# Patient Record
Sex: FEMALE | Race: WHITE | Employment: FULL TIME
[De-identification: names, ages, dates, MRNs, and addresses within clinical notes are randomized per-mention and may not be internally consistent; named-entity substitution may affect disease eponyms.]

---

## 2017-01-05 ENCOUNTER — OFFICE VISIT (OUTPATIENT)
Dept: FAMILY MEDICINE CLINIC | Facility: CLINIC | Age: 52
End: 2017-01-05

## 2017-01-05 VITALS
WEIGHT: 291 LBS | SYSTOLIC BLOOD PRESSURE: 120 MMHG | DIASTOLIC BLOOD PRESSURE: 72 MMHG | TEMPERATURE: 97.7 F | HEART RATE: 88 BPM | BODY MASS INDEX: 41.75 KG/M2

## 2017-01-05 DIAGNOSIS — J02.9 SORE THROAT: Primary | ICD-10-CM

## 2017-01-05 DIAGNOSIS — J40 BRONCHITIS: ICD-10-CM

## 2017-01-05 LAB — S PYO AG THROAT QL: NORMAL

## 2017-01-05 PROCEDURE — G8484 FLU IMMUNIZE NO ADMIN: HCPCS | Performed by: FAMILY MEDICINE

## 2017-01-05 PROCEDURE — 87880 STREP A ASSAY W/OPTIC: CPT | Performed by: FAMILY MEDICINE

## 2017-01-05 PROCEDURE — 99213 OFFICE O/P EST LOW 20 MIN: CPT | Performed by: FAMILY MEDICINE

## 2017-01-05 PROCEDURE — G8419 CALC BMI OUT NRM PARAM NOF/U: HCPCS | Performed by: FAMILY MEDICINE

## 2017-01-05 PROCEDURE — 3017F COLORECTAL CA SCREEN DOC REV: CPT | Performed by: FAMILY MEDICINE

## 2017-01-05 PROCEDURE — G8427 DOCREV CUR MEDS BY ELIG CLIN: HCPCS | Performed by: FAMILY MEDICINE

## 2017-01-05 PROCEDURE — 3014F SCREEN MAMMO DOC REV: CPT | Performed by: FAMILY MEDICINE

## 2017-01-05 PROCEDURE — 1036F TOBACCO NON-USER: CPT | Performed by: FAMILY MEDICINE

## 2017-01-05 RX ORDER — AZITHROMYCIN 250 MG/1
TABLET, FILM COATED ORAL
Qty: 1 PACKET | Refills: 0 | Status: SHIPPED | OUTPATIENT
Start: 2017-01-05 | End: 2017-01-15

## 2017-01-05 ASSESSMENT — ENCOUNTER SYMPTOMS
NAUSEA: 1
VOMITING: 0
COUGH: 1
SORE THROAT: 1

## 2017-01-15 ASSESSMENT — ENCOUNTER SYMPTOMS
CONSTIPATION: 0
COLOR CHANGE: 0
PHOTOPHOBIA: 0
TROUBLE SWALLOWING: 0
ABDOMINAL DISTENTION: 0
DIARRHEA: 0
BACK PAIN: 0
SHORTNESS OF BREATH: 0
WHEEZING: 0
ABDOMINAL PAIN: 0

## 2017-06-09 ENCOUNTER — OFFICE VISIT (OUTPATIENT)
Dept: FAMILY MEDICINE CLINIC | Age: 52
End: 2017-06-09
Payer: COMMERCIAL

## 2017-06-09 VITALS
BODY MASS INDEX: 41.61 KG/M2 | SYSTOLIC BLOOD PRESSURE: 115 MMHG | OXYGEN SATURATION: 98 % | DIASTOLIC BLOOD PRESSURE: 76 MMHG | WEIGHT: 290 LBS | HEART RATE: 78 BPM

## 2017-06-09 DIAGNOSIS — M25.512 ACUTE PAIN OF LEFT SHOULDER: Primary | ICD-10-CM

## 2017-06-09 PROCEDURE — 3014F SCREEN MAMMO DOC REV: CPT | Performed by: FAMILY MEDICINE

## 2017-06-09 PROCEDURE — G8427 DOCREV CUR MEDS BY ELIG CLIN: HCPCS | Performed by: FAMILY MEDICINE

## 2017-06-09 PROCEDURE — 99213 OFFICE O/P EST LOW 20 MIN: CPT | Performed by: FAMILY MEDICINE

## 2017-06-09 PROCEDURE — 1036F TOBACCO NON-USER: CPT | Performed by: FAMILY MEDICINE

## 2017-06-09 PROCEDURE — 3017F COLORECTAL CA SCREEN DOC REV: CPT | Performed by: FAMILY MEDICINE

## 2017-06-09 PROCEDURE — G8419 CALC BMI OUT NRM PARAM NOF/U: HCPCS | Performed by: FAMILY MEDICINE

## 2017-06-09 RX ORDER — MELOXICAM 15 MG/1
7.5-15 TABLET ORAL DAILY PRN
Qty: 30 TABLET | Refills: 0 | Status: SHIPPED | OUTPATIENT
Start: 2017-06-09 | End: 2017-07-27 | Stop reason: SDUPTHER

## 2017-06-18 ASSESSMENT — ENCOUNTER SYMPTOMS
ABDOMINAL PAIN: 0
CONSTIPATION: 0
TROUBLE SWALLOWING: 0
COLOR CHANGE: 0
BACK PAIN: 0
PHOTOPHOBIA: 0
COUGH: 0
VOMITING: 0
SHORTNESS OF BREATH: 0
DIARRHEA: 0
ABDOMINAL DISTENTION: 0
NAUSEA: 0
WHEEZING: 0

## 2017-07-27 DIAGNOSIS — M25.512 ACUTE PAIN OF LEFT SHOULDER: ICD-10-CM

## 2017-07-28 RX ORDER — MELOXICAM 15 MG/1
TABLET ORAL
Qty: 30 TABLET | Refills: 0 | Status: SHIPPED | OUTPATIENT
Start: 2017-07-28 | End: 2017-10-10 | Stop reason: ALTCHOICE

## 2017-10-10 ENCOUNTER — OFFICE VISIT (OUTPATIENT)
Dept: FAMILY MEDICINE CLINIC | Age: 52
End: 2017-10-10
Payer: COMMERCIAL

## 2017-10-10 VITALS
SYSTOLIC BLOOD PRESSURE: 122 MMHG | HEART RATE: 80 BPM | HEIGHT: 70 IN | BODY MASS INDEX: 41.66 KG/M2 | DIASTOLIC BLOOD PRESSURE: 80 MMHG | OXYGEN SATURATION: 98 % | WEIGHT: 291 LBS | TEMPERATURE: 98.2 F

## 2017-10-10 DIAGNOSIS — M25.512 ACUTE PAIN OF LEFT SHOULDER: ICD-10-CM

## 2017-10-10 DIAGNOSIS — Z02.89 ENCOUNTER FOR PHYSICAL EXAMINATION RELATED TO EMPLOYMENT: ICD-10-CM

## 2017-10-10 DIAGNOSIS — M75.102 TEAR OF LEFT ROTATOR CUFF, UNSPECIFIED TEAR EXTENT: Primary | ICD-10-CM

## 2017-10-10 LAB
BILIRUBIN, POC: NORMAL
BLOOD URINE, POC: NORMAL
CLARITY, POC: CLEAR
COLOR, POC: NORMAL
GLUCOSE URINE, POC: NORMAL
KETONES, POC: NORMAL
LEUKOCYTE EST, POC: NORMAL
NITRITE, POC: NORMAL
PH, POC: 6
PROTEIN, POC: NORMAL
SPECIFIC GRAVITY, POC: 1.01
UROBILINOGEN, POC: NORMAL

## 2017-10-10 PROCEDURE — 81002 URINALYSIS NONAUTO W/O SCOPE: CPT | Performed by: NURSE PRACTITIONER

## 2017-10-10 PROCEDURE — 99396 PREV VISIT EST AGE 40-64: CPT | Performed by: NURSE PRACTITIONER

## 2017-10-10 ASSESSMENT — ENCOUNTER SYMPTOMS
ABDOMINAL PAIN: 0
BLURRED VISION: 0
DOUBLE VISION: 0
SORE THROAT: 0
DIARRHEA: 0
SHORTNESS OF BREATH: 0
VOMITING: 0
BACK PAIN: 0
CONSTIPATION: 0
ORTHOPNEA: 0
NAUSEA: 0
COUGH: 0
HEARTBURN: 0
BLOOD IN STOOL: 0

## 2017-10-10 NOTE — PROGRESS NOTES
HPI Notes    Name: Emiliana Baker  : 1965         Chief Complaint:     Chief Complaint   Patient presents with    Employment Physical     work physical for bus driving    Shoulder Pain     left shoulder pain, has been present x 4 months. Hurts to raise arm, limited range of motion       History of Present Illness:      Emiliana Baker is a 47 y.o.  female who presents with Employment Physical (work physical for bus driving) and Shoulder Pain (left shoulder pain, has been present x 4 months. Hurts to raise arm, limited range of motion)      Shoulder Pain    The pain is present in the left shoulder. This is a recurrent problem. The current episode started more than 1 month ago. There has been no history of extremity trauma. The problem occurs daily. The problem has been gradually worsening. The quality of the pain is described as sharp. The pain is moderate. Associated symptoms include stiffness. Pertinent negatives include no fever, itching or joint locking. The symptoms are aggravated by activity. She has tried OTC pain meds for the symptoms. The treatment provided mild relief. 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 She denies any physical aspects of her job that she is unable to perform.        Past Medical History:     Past Medical History:   Diagnosis Date    Bell's palsy 2013    Carpal tunnel syndrome     Chicken pox     Hydronephrosis 2009    left     Hyperlipidemia 2013    Pneumonia     Renal atrophy     UTI (urinary tract infection)       Reviewed all health maintenance requirements and ordered appropriate tests  Health Maintenance Due   Topic Date Due    Hepatitis C screen  1965    HIV screen  1979    DTaP/Tdap/Td vaccine (1 - Tdap) 1983    Cervical cancer screen  1985    Colon cancer screen colonoscopy  2014    Diabetes screen  2016    Flu vaccine (1) 2017       Past Surgical History:     Past time. She appears well-developed and well-nourished. HENT:   Head: Normocephalic. Neck: Normal range of motion. Cardiovascular: Normal rate, regular rhythm, S1 normal, S2 normal and normal heart sounds. No murmur heard. Pulmonary/Chest: Effort normal and breath sounds normal. No respiratory distress. Abdominal: Soft. Normal appearance and bowel sounds are normal. There is no tenderness. Musculoskeletal:        Left shoulder: She exhibits decreased range of motion, tenderness, pain and decreased strength. Larry test positive. Pt unable to do Apley scratch test. PE inhibited due to apprehension. Neurological: She is alert and oriented to person, place, and time. Nursing note and vitals reviewed. Data:     Lab Results   Component Value Date     11/25/2013    K 4.3 11/25/2013     11/25/2013    CO2 27 11/25/2013    BUN 9 11/25/2013    CREATININE 0.60 11/25/2013    GLUCOSE 93 11/25/2013    PROT 7.2 11/25/2013    LABALBU 4.8 02/03/2014    BILITOT 0.76 11/25/2013    ALKPHOS 69 02/03/2014    AST 28 02/03/2014    ALT 40 02/03/2014     No results found for: WBC, RBC, HGB, HCT, MCV, MCH, MCHC, RDW, PLT, MPV  Lab Results   Component Value Date    TSH 1.26 11/25/2013     Lab Results   Component Value Date    CHOL 169 02/03/2014    HDL 43 02/03/2014    LABA1C 5.8 11/25/2013          Assessment & Plan       1. Tear of left rotator cuff, unspecified tear extent   ---Patient was seen June 6, 2017 at which time patient was scheduled for physical therapy. However, patient never went to physical therapy. Shoulder has continued to get worse with decreasing range of motion. We will send for physical therapy at this time. Patient will also take Aleve 2 tablets in a.m., 1 tablet in p.m. patient advised about the importance of following up with physical therapy. If physical therapy does not help or if improvement is not noted, we will send for MRI. [facility] [place]   2.  Acute

## 2017-10-10 NOTE — PATIENT INSTRUCTIONS
SURVEY:    You may be receiving a survey from [employer] regarding your visit today. Please complete the survey to enable us to provide the highest quality of care to you and your family. If you cannot score us a very good on any question, please call the office to discuss how we could of made your experience a very good one. Thank you.

## 2017-10-20 ENCOUNTER — HOSPITAL ENCOUNTER (OUTPATIENT)
Dept: PHYSICAL THERAPY | Age: 52
Setting detail: THERAPIES SERIES
Discharge: HOME | End: 2017-10-20
Payer: COMMERCIAL

## 2017-10-20 PROCEDURE — G0283 ELEC STIM OTHER THAN WOUND: HCPCS

## 2017-10-20 PROCEDURE — 97162 PT EVAL MOD COMPLEX 30 MIN: CPT

## 2017-10-20 ASSESSMENT — PAIN DESCRIPTION - ORIENTATION: ORIENTATION: LEFT

## 2017-10-20 ASSESSMENT — PAIN DESCRIPTION - LOCATION: LOCATION: SHOULDER

## 2017-10-20 ASSESSMENT — PAIN SCALES - GENERAL: PAINLEVEL_OUTOF10: 7

## 2017-10-20 NOTE — PROGRESS NOTES
Phone: [phone] [employer] [address]         Fax: [phone]                      Outpatient Physical Therapy                                                                      Evaluation    Date: 10/20/2017  Patient: Yoko Schmidt  : 1965  ACCT #: [de-identified]    Referring Practitioner: Gerber Laws CNP    Referral Date : 10/10/17    Diagnosis: Tear of left rotator cuff    Treatment Diagnosis: left shld pain  Onset Date: 10/10/17  PT Insurance Information: Medical [place]  Total # of Visits Approved: 12 Per Physician Order  Total # of Visits to Date: 1     Subjective     Additional Pertinent Hx: 2017 noticed gradual onset of pain in left shoulder. Now she is unable to lift arm sideways. Pain 0/10 sitting and resting; 7/10 with reaching/ activities . C/o weakness in left arm. .  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 Meds- ibrophen as needed. Diane Alvarez No MRI yet but suspected rotator cuff tear.   Pain Screening  Patient Currently in Pain: Yes  Pain Assessment  Pain Assessment: 0-10  Pain Level: 7 (with reaching)  Pain Location: Shoulder  Pain Orientation: Left  Social/Functional History  Occupation: Full time employment  Type of occupation: teacher       Objective           Strength LUE  Strength LUE: Exception  L Shoulder Flexion: 3+/5  L Shoulder ABduction: 3-/5  L Shoulder Internal Rotation: 4-/5  L Shoulder External Rotation: 4-/5  L Shoulder Horizontal ABduction: 3+/5    AROM LUE (degrees)  LUE AROM : Exceptions  L Shoulder Flexion 0-180: 135  L Shoulder ABduction 0-180: 78     PROM LUE (degrees)  LUE PROM: Exceptions  L Shoulder Flex  0-170: 140  L Shoulder ABduction 0-140: 90  L Shoulder Int Rotation  0-70: 45  L Shoulder Ext Rotation  0-90: 45              Assessment  Body structures, Functions, Activity limitations: Decreased ADL status, Decreased ROM, Decreased strength  Prognosis: Fair  Decision Making: Medium Complexity  History: as above  Exam: UEFS score of 52/80  Clinical Presentation: evolving    Clinical Presentation:  [] Stable/Uncomplicated  [x] Evolving [] Unstable/Unpredictable  The Following Comorbities will impact the patients progression and Plan of Care:   [] Diabetes    [] Stroke  [] Cardiac Disease/Pacemaker [] Previous Orthopedic Injury/Surgery  [] Seizure Disorder   [] WB Restrictions  [x] Obesity    [] Neuropathy     Education:   Patient Education: On POC and HEP handout        Goals  Short term goals  Time Frame for Short term goals: 6  Short term goal 1: Patient to be independent with HEP  Short term goal 2: Increase PROM left shld ER 70 degrees  Short term goal 3: Increase PROM left shld IR 60 degrees  Short term goal 4: Increase PROM left shld abduction 120    Long term goals  Time Frame for Long term goals : 12  Long term goal 1:  Increase AROM left shldflexion 150 for functional overhead reach  Long term goal 2: Decrease pain 4/10 at worse with activity  Long term goal 3: Improve functional activities with score of 65/80 or better on UEFS    Patient's Goal:   Regain full use of left arm    Timed Code Treatment Minutes: 5 Minutes  Total Treatment Time: 45     Time In: 12:55  Time Out: 13:40    Lenny Rizvi  10/20/2017

## 2017-10-20 NOTE — PLAN OF CARE
[facility]       Phone: [phone]   Date: 10/20/2017                      Outpatient Physical Therapy  Fax: [phone]    ACCT #: [[id number]]                     Plan of Care  [facility]#: [id number]  Patient: Geovany Ruiz  : 1965    Referring Practitioner: Val Lucio CNP    Referral Date : 10/10/17    Diagnosis: Tear of left rotator cuff  Onset Date: 10/10/17  Treatment Diagnosis: left shld pain    Assessment  Body structures, Functions, Activity limitations: Decreased ADL status, Decreased ROM, Decreased strength  Prognosis: Fair    Treatment Plan   Days: 2 times per week Weeks: 6 weeks Total # of Visits Approved: 12  [x] HP/CP     [x] Electrical Stimulation   [x]  Therapeutic Exercise   []  Gait Training  [] Traction   [] Ultrasound                   []  Massage                        []  Work Conditioning   [] Aquatics  [] Back Education            []  Therapeutic Activity [x]  Manual Therapy  [x]  Patient Education/HEP []  Anodyne Therapy     Goals  Short term goals  Time Frame for Short term goals: 6  Short term goal 1: Patient to be independent with HEP  Short term goal 2: Increase PROM left shld ER 70 degrees  Short term goal 3: Increase PROM left shld IR 60 degrees  Short term goal 4: Increase PROM left shld abduction 120  Long term goals  Time Frame for Long term goals : 12  Long term goal 1: Increase AROM left shldflexion 150 for functional overhead reach  Long term goal 2: Decrease pain 4/10 at worse with activity  Long term goal 3: Improve functional activities with score of 65/80 or better on UEFS     Catalina Shaver, PT     Date: 10/20/2017    ______________________________________ Date: 10/20/2017  Physician Signature  By signing above or cosigning electronically, I have reviewed this Plan of Care and certify a need for medically necessary rehabilitation services.

## 2017-10-23 ENCOUNTER — HOSPITAL ENCOUNTER (OUTPATIENT)
Dept: PHYSICAL THERAPY | Age: 52
Setting detail: THERAPIES SERIES
Discharge: HOME | End: 2017-10-23
Payer: COMMERCIAL

## 2017-10-23 PROCEDURE — 97140 MANUAL THERAPY 1/> REGIONS: CPT

## 2017-10-23 PROCEDURE — 97110 THERAPEUTIC EXERCISES: CPT

## 2017-10-23 PROCEDURE — G0283 ELEC STIM OTHER THAN WOUND: HCPCS

## 2017-10-23 ASSESSMENT — PAIN DESCRIPTION - ORIENTATION: ORIENTATION: LEFT

## 2017-10-23 ASSESSMENT — PAIN SCALES - GENERAL: PAINLEVEL_OUTOF10: 7

## 2017-10-23 ASSESSMENT — PAIN DESCRIPTION - LOCATION: LOCATION: SHOULDER

## 2017-10-30 ENCOUNTER — HOSPITAL ENCOUNTER (OUTPATIENT)
Dept: PHYSICAL THERAPY | Age: 52
Setting detail: THERAPIES SERIES
Discharge: HOME | End: 2017-10-30
Payer: COMMERCIAL

## 2017-10-30 PROCEDURE — 97110 THERAPEUTIC EXERCISES: CPT

## 2017-10-30 PROCEDURE — G0283 ELEC STIM OTHER THAN WOUND: HCPCS

## 2017-10-30 ASSESSMENT — PAIN SCALES - GENERAL: PAINLEVEL_OUTOF10: 6

## 2017-11-01 ENCOUNTER — HOSPITAL ENCOUNTER (OUTPATIENT)
Dept: PHYSICAL THERAPY | Age: 52
Setting detail: THERAPIES SERIES
Discharge: HOME | End: 2017-11-01
Payer: COMMERCIAL

## 2017-11-01 PROCEDURE — 97110 THERAPEUTIC EXERCISES: CPT

## 2017-11-01 PROCEDURE — G0283 ELEC STIM OTHER THAN WOUND: HCPCS

## 2017-11-01 ASSESSMENT — PAIN SCALES - GENERAL: PAINLEVEL_OUTOF10: 6

## 2017-11-06 ENCOUNTER — HOSPITAL ENCOUNTER (OUTPATIENT)
Dept: PHYSICAL THERAPY | Age: 52
Setting detail: THERAPIES SERIES
Discharge: HOME | End: 2017-11-06
Payer: COMMERCIAL

## 2017-11-06 PROCEDURE — G0283 ELEC STIM OTHER THAN WOUND: HCPCS

## 2017-11-06 PROCEDURE — 97140 MANUAL THERAPY 1/> REGIONS: CPT

## 2017-11-06 PROCEDURE — 97110 THERAPEUTIC EXERCISES: CPT

## 2017-11-06 ASSESSMENT — PAIN SCALES - GENERAL: PAINLEVEL_OUTOF10: 4

## 2017-11-06 NOTE — PROGRESS NOTES
Phone: [phone]            Fax: [phone]                             Outpatient Physical Therapy                                                                      Progress Report    Date: 2017   MRN: [id number]    ACCT#: [[id number]]  Patient: Marley Wilcox  : 1965  Referring Practitioner: Sarah King CNP    Referral Date : (P) 10/10/17    Diagnosis: (P) Tear of left rotator cuff      Treatment Diagnosis: (P) left shld pain    Onset Date: (P) 10/10/17  PT Insurance Information: (P) Medical [place]  Total # of Visits Approved: (P) 12 Per Physician Order   Visits Attended: 5    Assessment  No change in symptoms; Pain 5/14 with certain movements. Decrease shoulder ROM internal and external rotation. Weakness in rotator cuff. Please advise.     Prognosis: (P) Fair    Plan  Plan: (P) Continue with current plan    Goals  Short term goals  Time Frame for Short term goals: (P) 6  Short term goal 1: (P) Patient to be independent with HEP  Short term goal 2: (P) Increase PROM left shld ER 70 degrees  Short term goal 3: (P) Increase PROM left shld IR 60 degrees  Short term goal 4: (P) Increase PROM left shld abduction 120    Long term goals  Time Frame for Long term goals : (P) 12  Long term goal 1: (P) Increase AROM left shldflexion 150 for functional overhead reach  Long term goal 2: (P) Decrease pain 4/10 at worse with activity  Long term goal 3: (P) Improve functional activities with score of 65/80 or better on UEFS    [facility] Number: (P) [id number]     Date: 2017

## 2017-11-07 ENCOUNTER — TELEPHONE (OUTPATIENT)
Dept: FAMILY MEDICINE CLINIC | Age: 52
End: 2017-11-07

## 2017-11-07 DIAGNOSIS — M75.102 TEAR OF LEFT ROTATOR CUFF, UNSPECIFIED TEAR EXTENT: Primary | ICD-10-CM

## 2017-11-07 NOTE — TELEPHONE ENCOUNTER
[address] called stating [facility] hasn't been progressing with her PT for her shoulder pain. Would like to know if Maureen Garcia could go ahead and order an MRI for her. Please let [address] know.     Health Maintenance   Topic Date Due    Hepatitis C screen  1965    HIV screen  06/25/1979    DTaP/Tdap/Td vaccine (1 - Tdap) 06/25/1983    Cervical cancer screen  06/25/1985    Colon cancer screen colonoscopy  06/25/2014    Diabetes screen  11/25/2016    Flu vaccine (1) 09/01/2017    Breast cancer screen  12/28/2018    Lipid screen  02/03/2019             (applicable per patient's age: Cancer Screenings, Depression Screening, Fall Risk Screening, Immunizations)    Hemoglobin A1C (%)   Date Value   11/25/2013 5.8     LDL Cholesterol (mg/dL)   Date Value   11/25/2013 104     LDL Calculated (mg/dL)   Date Value   02/03/2014 92     AST (U/L)   Date Value   02/03/2014 28     ALT (U/L)   Date Value   02/03/2014 40     BUN (mg/dL)   Date Value   11/25/2013 9      (goal A1C is < 7)   (goal LDL is <100) need 30-50% reduction from baseline     BP Readings from Last 3 Encounters:   10/10/17 122/80   06/09/17 115/76   01/05/17 120/72    (goal /80)      All Future Testing planned in [facility]:  Lab Frequency Next Occurrence       Next Visit Date:  Future Appointments  Date Time Provider Maykel Iqbal   11/8/2017 4:00 PM Leila Haider, [facility] [place]   11/14/2017 6:00 AM Obinna Eagle, [facility]            Patient Active Problem List:     Right foot pain     Bell's palsy     Hyperlipidemia     Fatigue

## 2017-11-08 ENCOUNTER — HOSPITAL ENCOUNTER (OUTPATIENT)
Dept: PHYSICAL THERAPY | Age: 52
Setting detail: THERAPIES SERIES
Discharge: HOME | End: 2017-11-08
Payer: COMMERCIAL

## 2017-11-08 PROCEDURE — 97110 THERAPEUTIC EXERCISES: CPT

## 2017-11-08 PROCEDURE — G0283 ELEC STIM OTHER THAN WOUND: HCPCS

## 2017-11-08 ASSESSMENT — PAIN SCALES - GENERAL: PAINLEVEL_OUTOF10: 4

## 2017-11-08 NOTE — PROGRESS NOTES
Phone: [phone]      Fax: [phone]                            Outpatient Physical Therapy                                                                            Daily Note    Date: 2017  Patient Name: Augustus Murphy        MRN: [id number]   ACCT#:  [[id number]]  : 1965  (47 y.o.)    Referring Practitioner: Jordan Ferreira CNP    Referral Date : 10/10/17    Diagnosis: Tear of left rotator cuff  Treatment Diagnosis: left shld pain    Onset Date: 10/10/17  PT Insurance Information: Medical [place]  Total # of Visits Approved: 12 Per Physician Order  Total # of Visits to Date: 6    Pre-Treatment Pain:  4/10     Assessment  Assessment: Pt. reports L shoulder pain is a 4/10 this afternoon. She states she didn't have to do much lifting at work today so her shoulder isn't feeling too bad. Good tolerance to exercises and sholder mobs. Pt. being placed on hold at this time until pt. follows up with [facility] and gets the results from her MRI. Chart Reviewed: Yes    Plan  Plan: Hold pending MD assessment    Exercises/Modalities/Manual:  See DocFlow Sheet    Education:           Goals  (Total # of Visits to Date: 6)   Short Term Goals - Time Frame for Short term goals: 6  Short term goal 1: Patient to be independent with HEP  Short term goal 2: Increase PROM left shld ER 70 degrees  Short term goal 3: Increase PROM left shld IR 60 degrees  Short term goal 4: Increase PROM left shld abduction 120       Long Term Goals - Time Frame for Long term goals : 12  Long term goal 1:  Increase AROM left shldflexion 150 for functional overhead reach  Long term goal 2: Decrease pain 4/10 at worse with activity  Long term goal 3: Improve functional activities with score of 65/80 or better on UEFS          Post Treatment Pain:  0/10    Time In: 1610    Time Out : 1650   Timed Code Treatment Minutes: 30 Minutes  Total Treatment Time: [facility] Number: PTA    Date: 11/8/2017

## 2017-11-15 ENCOUNTER — HOSPITAL ENCOUNTER (OUTPATIENT)
Dept: MRI IMAGING | Age: 52
Discharge: HOME | End: 2017-11-15
Payer: COMMERCIAL

## 2017-11-15 DIAGNOSIS — M75.102 TEAR OF LEFT ROTATOR CUFF, UNSPECIFIED TEAR EXTENT: ICD-10-CM

## 2017-11-15 PROCEDURE — 73221 MRI JOINT UPR EXTREM W/O DYE: CPT

## 2017-11-16 ENCOUNTER — TELEPHONE (OUTPATIENT)
Dept: FAMILY MEDICINE CLINIC | Age: 52
End: 2017-11-16

## 2018-01-02 ENCOUNTER — HOSPITAL ENCOUNTER (OUTPATIENT)
Age: 53
Setting detail: SPECIMEN
Discharge: HOME | End: 2018-01-02
Payer: COMMERCIAL

## 2018-01-02 ENCOUNTER — OFFICE VISIT (OUTPATIENT)
Dept: FAMILY MEDICINE CLINIC | Age: 53
End: 2018-01-02
Payer: COMMERCIAL

## 2018-01-02 VITALS
TEMPERATURE: 97.7 F | BODY MASS INDEX: 42.18 KG/M2 | DIASTOLIC BLOOD PRESSURE: 84 MMHG | SYSTOLIC BLOOD PRESSURE: 132 MMHG | WEIGHT: 293 LBS

## 2018-01-02 DIAGNOSIS — R30.0 DYSURIA: ICD-10-CM

## 2018-01-02 DIAGNOSIS — R30.0 DYSURIA: Primary | ICD-10-CM

## 2018-01-02 LAB
BILIRUBIN, POC: NEGATIVE
BLOOD URINE, POC: NEGATIVE
CLARITY, POC: CLEAR
COLOR, POC: NORMAL
GLUCOSE URINE, POC: NORMAL
KETONES, POC: NEGATIVE
LEUKOCYTE EST, POC: NORMAL
NITRITE, POC: NEGATIVE
PH, POC: 5
PROTEIN, POC: NEGATIVE
SPECIFIC GRAVITY, POC: 1.02
UROBILINOGEN, POC: 0.2

## 2018-01-02 PROCEDURE — 99213 OFFICE O/P EST LOW 20 MIN: CPT | Performed by: FAMILY MEDICINE

## 2018-01-02 PROCEDURE — 87086 URINE CULTURE/COLONY COUNT: CPT

## 2018-01-02 PROCEDURE — 81002 URINALYSIS NONAUTO W/O SCOPE: CPT | Performed by: FAMILY MEDICINE

## 2018-01-02 RX ORDER — OXYCODONE HYDROCHLORIDE AND ACETAMINOPHEN 5; 325 MG/1; MG/1
TABLET ORAL
Refills: 0 | COMMUNITY
Start: 2017-12-13 | End: 2018-03-21 | Stop reason: ALTCHOICE

## 2018-01-02 ASSESSMENT — ENCOUNTER SYMPTOMS
VOMITING: 0
DIARRHEA: 0
NAUSEA: 0
EYE PAIN: 0
ABDOMINAL PAIN: 0
EYE DISCHARGE: 0

## 2018-01-02 ASSESSMENT — PATIENT HEALTH QUESTIONNAIRE - PHQ9
SUM OF ALL RESPONSES TO PHQ QUESTIONS 1-9: 0
1. LITTLE INTEREST OR PLEASURE IN DOING THINGS: 0
SUM OF ALL RESPONSES TO PHQ9 QUESTIONS 1 & 2: 0
2. FEELING DOWN, DEPRESSED OR HOPELESS: 0

## 2018-01-02 NOTE — PROGRESS NOTES
(ZOCOR) 10 MG tablet Take 1 tablet by mouth nightly. 3/23/21   Edmond Elmore NP        Allergies:       Sulfa antibiotics    Social History:     Tobacco:    reports that she has never smoked. She has never used smokeless tobacco.  Alcohol:      reports that she does not drink alcohol. Drug Use:  reports that she does not use drugs. Family History:     Family History   Problem Relation Age of Onset    Cancer Mother      breast    Heart Disease Mother     Stroke Mother     Cancer Father        Review of Systems:       Review of Systems   Constitutional: Negative for chills and fever. Eyes: Negative for pain and discharge. Gastrointestinal: Negative for abdominal pain, diarrhea, nausea and vomiting. Genitourinary: Positive for dysuria. Negative for decreased urine volume, difficulty urinating and hematuria. Skin: Negative for pallor and rash. Physical Exam:     Physical Exam   Constitutional: She appears well-developed and well-nourished. HENT:   Head: Normocephalic and atraumatic. Eyes: Right eye exhibits no discharge. Abdominal: Soft. Bowel sounds are normal. She exhibits no distension. There is no tenderness. There is no rebound and no guarding. Skin: No rash noted. No erythema. Vitals reviewed.       Vitals:  /84   Temp 97.7 °F (36.5 °C) (Oral)   Wt 294 lb (133.4 kg)   BMI 42.18 kg/m²       Data:     Lab Results   Component Value Date     11/25/2013    K 4.3 11/25/2013     11/25/2013    CO2 27 11/25/2013    BUN 9 11/25/2013    CREATININE 0.60 11/25/2013    GLUCOSE 93 11/25/2013    PROT 7.2 11/25/2013    LABALBU 4.8 02/03/2014    BILITOT 0.76 11/25/2013    ALKPHOS 69 02/03/2014    AST 28 02/03/2014    ALT 40 02/03/2014     No results found for: WBC, RBC, HGB, HCT, MCV, MCH, MCHC, RDW, PLT, MPV  Lab Results   Component Value Date    TSH 1.26 11/25/2013     Lab Results   Component Value Date    CHOL 169 02/03/2014    HDL 43 02/03/2014    LABA1C 5.8 11/25/2013 Assessment/Plan:       1. Dysuria  Stable and will send the patient's urine for culture in 2d and will call pt with results. Pt will continue increased water and cranberry juice. - POCT Urinalysis no Micro  - Urine Culture;  Future        Electronically signed by Desiree Nugent MD on 1/2/2018 at 10:34 AM

## 2018-01-03 LAB
CULTURE: NORMAL
CULTURE: NORMAL
Lab: NORMAL
SPECIMEN DESCRIPTION: NORMAL
SPECIMEN DESCRIPTION: NORMAL
STATUS: NORMAL

## 2018-03-21 ENCOUNTER — OFFICE VISIT (OUTPATIENT)
Dept: FAMILY MEDICINE CLINIC | Age: 53
End: 2018-03-21
Payer: COMMERCIAL

## 2018-03-21 VITALS
WEIGHT: 290 LBS | BODY MASS INDEX: 41.61 KG/M2 | DIASTOLIC BLOOD PRESSURE: 70 MMHG | OXYGEN SATURATION: 97 % | HEART RATE: 75 BPM | SYSTOLIC BLOOD PRESSURE: 116 MMHG

## 2018-03-21 DIAGNOSIS — Z00.00 WELL ADULT EXAM: Primary | ICD-10-CM

## 2018-03-21 DIAGNOSIS — F34.1 DYSTHYMIA: ICD-10-CM

## 2018-03-21 PROCEDURE — 99396 PREV VISIT EST AGE 40-64: CPT | Performed by: FAMILY MEDICINE

## 2018-03-29 ENCOUNTER — TELEPHONE (OUTPATIENT)
Dept: FAMILY MEDICINE CLINIC | Age: 53
End: 2018-03-29

## 2018-04-01 ASSESSMENT — ENCOUNTER SYMPTOMS
ABDOMINAL PAIN: 0
PHOTOPHOBIA: 0
VOMITING: 0
COUGH: 0
ABDOMINAL DISTENTION: 0
WHEEZING: 0
NAUSEA: 0
DIARRHEA: 0
SHORTNESS OF BREATH: 0
CONSTIPATION: 0
COLOR CHANGE: 0
TROUBLE SWALLOWING: 0
BACK PAIN: 0

## 2018-07-03 ENCOUNTER — TELEPHONE (OUTPATIENT)
Dept: FAMILY MEDICINE CLINIC | Age: 53
End: 2018-07-03

## 2018-07-03 ENCOUNTER — HOSPITAL ENCOUNTER (EMERGENCY)
Age: 53
Discharge: HOME | End: 2018-07-03
Attending: FAMILY MEDICINE
Payer: COMMERCIAL

## 2018-07-03 ENCOUNTER — APPOINTMENT (OUTPATIENT)
Dept: CT IMAGING | Age: 53
End: 2018-07-03
Payer: COMMERCIAL

## 2018-07-03 VITALS
WEIGHT: 293 LBS | HEART RATE: 88 BPM | TEMPERATURE: 98.4 F | SYSTOLIC BLOOD PRESSURE: 148 MMHG | DIASTOLIC BLOOD PRESSURE: 61 MMHG | BODY MASS INDEX: 42.45 KG/M2 | OXYGEN SATURATION: 99 % | RESPIRATION RATE: 18 BRPM

## 2018-07-03 DIAGNOSIS — N39.0 URINARY TRACT INFECTION WITHOUT HEMATURIA, SITE UNSPECIFIED: Primary | ICD-10-CM

## 2018-07-03 DIAGNOSIS — Q62.8: ICD-10-CM

## 2018-07-03 LAB
-: ABNORMAL
ABSOLUTE EOS #: 0.2 K/UL (ref 0–0.4)
ABSOLUTE IMMATURE GRANULOCYTE: ABNORMAL K/UL (ref 0–0.3)
ABSOLUTE LYMPH #: 2.2 K/UL (ref 1–4.8)
ABSOLUTE MONO #: 0.9 K/UL (ref 0–1)
ALBUMIN SERPL-MCNC: 4.5 G/DL (ref 3.5–5.2)
ALBUMIN/GLOBULIN RATIO: NORMAL (ref 1–2.5)
ALP BLD-CCNC: 88 U/L (ref 35–104)
ALT SERPL-CCNC: 28 U/L (ref 5–33)
AMORPHOUS: ABNORMAL
ANION GAP SERPL CALCULATED.3IONS-SCNC: 15 MMOL/L (ref 9–17)
AST SERPL-CCNC: 16 U/L
BACTERIA: ABNORMAL
BASOPHILS # BLD: 0 % (ref 0–2)
BASOPHILS ABSOLUTE: 0.1 K/UL (ref 0–0.2)
BILIRUB SERPL-MCNC: 0.44 MG/DL (ref 0.3–1.2)
BILIRUBIN DIRECT: <0.08 MG/DL
BILIRUBIN URINE: ABNORMAL
BILIRUBIN, INDIRECT: NORMAL MG/DL (ref 0–1)
BUN BLDV-MCNC: 18 MG/DL (ref 6–20)
BUN/CREAT BLD: 26 (ref 9–20)
CALCIUM SERPL-MCNC: 9.8 MG/DL (ref 8.6–10.4)
CASTS UA: ABNORMAL /LPF
CHLORIDE BLD-SCNC: 101 MMOL/L (ref 98–107)
CO2: 24 MMOL/L (ref 20–31)
COLOR: ABNORMAL
COMMENT UA: ABNORMAL
CREAT SERPL-MCNC: 0.68 MG/DL (ref 0.5–0.9)
CRYSTALS, UA: ABNORMAL /HPF
DIFFERENTIAL TYPE: YES
EOSINOPHILS RELATIVE PERCENT: 1 % (ref 0–5)
EPITHELIAL CELLS UA: ABNORMAL /HPF
GFR AFRICAN AMERICAN: >60 ML/MIN
GFR NON-AFRICAN AMERICAN: >60 ML/MIN
GFR SERPL CREATININE-BSD FRML MDRD: ABNORMAL ML/MIN/{1.73_M2}
GFR SERPL CREATININE-BSD FRML MDRD: ABNORMAL ML/MIN/{1.73_M2}
GLOBULIN: NORMAL G/DL (ref 1.5–3.8)
GLUCOSE BLD-MCNC: 130 MG/DL (ref 70–99)
GLUCOSE URINE: ABNORMAL
HCT VFR BLD CALC: 43.3 % (ref 36–46)
HEMOGLOBIN: 14.6 G/DL (ref 12–16)
IMMATURE GRANULOCYTES: ABNORMAL %
KETONES, URINE: ABNORMAL
LACTIC ACID, WHOLE BLOOD: NORMAL MMOL/L (ref 0.7–2.1)
LACTIC ACID: 1 MMOL/L (ref 0.5–2.2)
LEUKOCYTE ESTERASE, URINE: ABNORMAL
LIPASE: 20 U/L (ref 13–60)
LYMPHOCYTES # BLD: 14 % (ref 15–40)
MCH RBC QN AUTO: 29.1 PG (ref 26–34)
MCHC RBC AUTO-ENTMCNC: 33.8 G/DL (ref 31–37)
MCV RBC AUTO: 86.3 FL (ref 80–100)
MONOCYTES # BLD: 6 % (ref 4–8)
MUCUS: ABNORMAL
NITRITE, URINE: ABNORMAL
NRBC AUTOMATED: ABNORMAL PER 100 WBC
OTHER OBSERVATIONS UA: ABNORMAL
PDW BLD-RTO: 13.5 % (ref 12.1–15.2)
PH UA: ABNORMAL (ref 5–8)
PLATELET # BLD: 376 K/UL (ref 140–450)
PLATELET ESTIMATE: ABNORMAL
PMV BLD AUTO: ABNORMAL FL (ref 6–12)
POTASSIUM SERPL-SCNC: 4.1 MMOL/L (ref 3.7–5.3)
PROTEIN UA: ABNORMAL
RBC # BLD: 5.02 M/UL (ref 4–5.2)
RBC # BLD: ABNORMAL 10*6/UL
RBC UA: ABNORMAL /HPF (ref 0–2)
RENAL EPITHELIAL, UA: ABNORMAL /HPF
SEG NEUTROPHILS: 79 % (ref 47–75)
SEGMENTED NEUTROPHILS ABSOLUTE COUNT: 12.5 K/UL (ref 2.5–7)
SODIUM BLD-SCNC: 140 MMOL/L (ref 135–144)
SPECIFIC GRAVITY UA: 1.02 (ref 1–1.03)
TOTAL PROTEIN: 7.5 G/DL (ref 6.4–8.3)
TRICHOMONAS: ABNORMAL
TURBIDITY: CLEAR
URINE HGB: ABNORMAL
UROBILINOGEN, URINE: ABNORMAL
WBC # BLD: 15.9 K/UL (ref 3.5–11)
WBC # BLD: ABNORMAL 10*3/UL
WBC UA: ABNORMAL /HPF
YEAST: ABNORMAL

## 2018-07-03 PROCEDURE — 87086 URINE CULTURE/COLONY COUNT: CPT

## 2018-07-03 PROCEDURE — 87186 SC STD MICRODIL/AGAR DIL: CPT

## 2018-07-03 PROCEDURE — 96375 TX/PRO/DX INJ NEW DRUG ADDON: CPT

## 2018-07-03 PROCEDURE — 99284 EMERGENCY DEPT VISIT MOD MDM: CPT

## 2018-07-03 PROCEDURE — 80076 HEPATIC FUNCTION PANEL: CPT

## 2018-07-03 PROCEDURE — 6370000000 HC RX 637 (ALT 250 FOR IP): Performed by: EMERGENCY MEDICINE

## 2018-07-03 PROCEDURE — 83605 ASSAY OF LACTIC ACID: CPT

## 2018-07-03 PROCEDURE — 74176 CT ABD & PELVIS W/O CONTRAST: CPT

## 2018-07-03 PROCEDURE — 85025 COMPLETE CBC W/AUTO DIFF WBC: CPT

## 2018-07-03 PROCEDURE — 87077 CULTURE AEROBIC IDENTIFY: CPT

## 2018-07-03 PROCEDURE — 6360000002 HC RX W HCPCS: Performed by: FAMILY MEDICINE

## 2018-07-03 PROCEDURE — 80048 BASIC METABOLIC PNL TOTAL CA: CPT

## 2018-07-03 PROCEDURE — 83690 ASSAY OF LIPASE: CPT

## 2018-07-03 PROCEDURE — 36415 COLL VENOUS BLD VENIPUNCTURE: CPT

## 2018-07-03 PROCEDURE — 96374 THER/PROPH/DIAG INJ IV PUSH: CPT

## 2018-07-03 PROCEDURE — 81001 URINALYSIS AUTO W/SCOPE: CPT

## 2018-07-03 RX ORDER — FENTANYL CITRATE 50 UG/ML
50 INJECTION, SOLUTION INTRAMUSCULAR; INTRAVENOUS ONCE
Status: DISCONTINUED | OUTPATIENT
Start: 2018-07-03 | End: 2018-07-03

## 2018-07-03 RX ORDER — NITROFURANTOIN 25; 75 MG/1; MG/1
100 CAPSULE ORAL ONCE
Status: COMPLETED | OUTPATIENT
Start: 2018-07-03 | End: 2018-07-03

## 2018-07-03 RX ORDER — ONDANSETRON 2 MG/ML
4 INJECTION INTRAMUSCULAR; INTRAVENOUS EVERY 30 MIN PRN
Status: DISCONTINUED | OUTPATIENT
Start: 2018-07-03 | End: 2018-07-04 | Stop reason: HOSPADM

## 2018-07-03 RX ORDER — KETOROLAC TROMETHAMINE 30 MG/ML
30 INJECTION, SOLUTION INTRAMUSCULAR; INTRAVENOUS ONCE
Status: COMPLETED | OUTPATIENT
Start: 2018-07-03 | End: 2018-07-03

## 2018-07-03 RX ORDER — NITROFURANTOIN 25; 75 MG/1; MG/1
100 CAPSULE ORAL 2 TIMES DAILY
Qty: 14 CAPSULE | Refills: 0 | Status: SHIPPED | OUTPATIENT
Start: 2018-07-03 | End: 2018-07-13

## 2018-07-03 RX ORDER — HYDROCODONE BITARTRATE AND ACETAMINOPHEN 5; 325 MG/1; MG/1
2 TABLET ORAL ONCE
Status: COMPLETED | OUTPATIENT
Start: 2018-07-03 | End: 2018-07-03

## 2018-07-03 RX ADMIN — KETOROLAC TROMETHAMINE 30 MG: 30 INJECTION, SOLUTION INTRAMUSCULAR at 18:01

## 2018-07-03 RX ADMIN — HYDROCODONE BITARTRATE AND ACETAMINOPHEN 2 TABLET: 5; 325 TABLET ORAL at 21:59

## 2018-07-03 RX ADMIN — NITROFURANTOIN MONOHYDRATE/MACROCRYSTALLINE 100 MG: 25; 75 CAPSULE ORAL at 21:59

## 2018-07-03 RX ADMIN — ONDANSETRON 4 MG: 2 INJECTION, SOLUTION INTRAMUSCULAR; INTRAVENOUS at 18:02

## 2018-07-03 ASSESSMENT — PAIN SCALES - GENERAL
PAINLEVEL_OUTOF10: 8
PAINLEVEL_OUTOF10: 2
PAINLEVEL_OUTOF10: 2
PAINLEVEL_OUTOF10: 1
PAINLEVEL_OUTOF10: 9

## 2018-07-03 ASSESSMENT — PAIN DESCRIPTION - ONSET: ONSET: ON-GOING

## 2018-07-03 ASSESSMENT — PAIN DESCRIPTION - DESCRIPTORS
DESCRIPTORS: CONSTANT
DESCRIPTORS: SHARP

## 2018-07-03 ASSESSMENT — PAIN DESCRIPTION - ORIENTATION
ORIENTATION: LEFT
ORIENTATION: LEFT

## 2018-07-03 ASSESSMENT — PAIN DESCRIPTION - PAIN TYPE
TYPE: ACUTE PAIN
TYPE: ACUTE PAIN

## 2018-07-03 ASSESSMENT — PAIN DESCRIPTION - LOCATION: LOCATION: ABDOMEN

## 2018-07-03 ASSESSMENT — PAIN DESCRIPTION - FREQUENCY
FREQUENCY: CONTINUOUS
FREQUENCY: CONTINUOUS

## 2018-07-03 ASSESSMENT — PAIN DESCRIPTION - DIRECTION: RADIATING_TOWARDS: LLQ

## 2018-07-03 ASSESSMENT — PAIN DESCRIPTION - PROGRESSION: CLINICAL_PROGRESSION: GRADUALLY WORSENING

## 2018-07-03 NOTE — TELEPHONE ENCOUNTER
Saritha Rodriguez believes she has a uti.she said she is have the left side pain, urinary frequency, and her urine is cloudy. Would like to know if something could be called in for her? She has these 'all the time' and would like something called in for her since we wont be in tomorrow. [facility]    Next Visit Date:  No future appointments.     Health Maintenance   Topic Date Due    Hepatitis C screen  1965    HIV screen  06/25/1979    DTaP/Tdap/Td vaccine (1 - Tdap) 06/25/1983    Cervical cancer screen  06/25/1985    Shingles Vaccine (1 of 2 - 2 Dose Series) 06/25/2014    Colon cancer screen colonoscopy  06/25/2014    A1C test (Diabetic or Prediabetic)  11/25/2014    Flu vaccine (1) 09/01/2018    Breast cancer screen  12/28/2018    Lipid screen  02/03/2019       Hemoglobin A1C (%)   Date Value   11/25/2013 5.8             ( goal A1C is < 7)   No results found for: LABMICR  LDL Cholesterol (mg/dL)   Date Value   11/25/2013 104     LDL Calculated (mg/dL)   Date Value   02/03/2014 92       (goal LDL is <100)   AST (U/L)   Date Value   02/03/2014 28     ALT (U/L)   Date Value   02/03/2014 40     BUN (mg/dL)   Date Value   11/25/2013 9     BP Readings from Last 3 Encounters:   03/21/18 116/70   01/02/18 132/84   10/10/17 122/80          (goal 120/80)    All Future Testing planned in [facility]  Lab Frequency Next Occurrence               Patient Active Problem List:     Right foot pain     Bell's palsy     Hyperlipidemia     Fatigue

## 2018-07-03 NOTE — ED NOTES
Pt states has increased urination. Denies blood in urine. States has LLQ pain. Denies blood in stool or history of diverticulitis. States feels light headed in room. Pt now lying in bed. Significant other at bedside.      Ericka Wang RN  07/03/18 [id number]

## 2018-07-03 NOTE — TELEPHONE ENCOUNTER
Pt states she could not make it here before 5 when we close   Pt encouraged to go to the walk in care   Pt agrees

## 2018-07-05 LAB
CULTURE: ABNORMAL
Lab: ABNORMAL
ORGANISM: ABNORMAL
SPECIMEN DESCRIPTION: ABNORMAL
STATUS: ABNORMAL

## 2018-07-16 ENCOUNTER — HOSPITAL ENCOUNTER (OUTPATIENT)
Age: 53
Setting detail: SPECIMEN
Discharge: HOME | End: 2018-07-16
Payer: COMMERCIAL

## 2018-07-16 ENCOUNTER — OFFICE VISIT (OUTPATIENT)
Dept: UROLOGY | Age: 53
End: 2018-07-16
Payer: COMMERCIAL

## 2018-07-16 VITALS
TEMPERATURE: 98 F | DIASTOLIC BLOOD PRESSURE: 82 MMHG | HEIGHT: 70 IN | BODY MASS INDEX: 41.95 KG/M2 | WEIGHT: 293 LBS | SYSTOLIC BLOOD PRESSURE: 136 MMHG

## 2018-07-16 DIAGNOSIS — N30.00 ACUTE CYSTITIS WITHOUT HEMATURIA: ICD-10-CM

## 2018-07-16 DIAGNOSIS — Q62.5 DUPLICATED LEFT RENAL COLLECTING SYSTEM: ICD-10-CM

## 2018-07-16 DIAGNOSIS — R93.5 ABNORMAL CT SCAN, PELVIS: ICD-10-CM

## 2018-07-16 DIAGNOSIS — N30.00 ACUTE CYSTITIS WITHOUT HEMATURIA: Primary | ICD-10-CM

## 2018-07-16 LAB
-: ABNORMAL
AMORPHOUS: ABNORMAL
BACTERIA: ABNORMAL
BILIRUBIN URINE: NEGATIVE
CASTS UA: ABNORMAL /LPF
COLOR: YELLOW
COMMENT UA: ABNORMAL
CRYSTALS, UA: ABNORMAL /HPF
EPITHELIAL CELLS UA: ABNORMAL /HPF (ref 0–25)
GLUCOSE URINE: NEGATIVE
KETONES, URINE: NEGATIVE
LEUKOCYTE ESTERASE, URINE: ABNORMAL
MUCUS: ABNORMAL
NITRITE, URINE: NEGATIVE
OTHER OBSERVATIONS UA: ABNORMAL
PH UA: 5.5 (ref 5–9)
PROTEIN UA: NEGATIVE
RBC UA: ABNORMAL /HPF (ref 0–2)
RENAL EPITHELIAL, UA: ABNORMAL /HPF
SPECIFIC GRAVITY UA: 1.02 (ref 1.01–1.02)
TRICHOMONAS: ABNORMAL
TURBIDITY: CLEAR
URINE HGB: NEGATIVE
UROBILINOGEN, URINE: NORMAL
WBC UA: ABNORMAL /HPF (ref 0–5)
YEAST: ABNORMAL

## 2018-07-16 PROCEDURE — 3017F COLORECTAL CA SCREEN DOC REV: CPT | Performed by: NURSE PRACTITIONER

## 2018-07-16 PROCEDURE — G8427 DOCREV CUR MEDS BY ELIG CLIN: HCPCS | Performed by: NURSE PRACTITIONER

## 2018-07-16 PROCEDURE — 1036F TOBACCO NON-USER: CPT | Performed by: NURSE PRACTITIONER

## 2018-07-16 PROCEDURE — G8417 CALC BMI ABV UP PARAM F/U: HCPCS | Performed by: NURSE PRACTITIONER

## 2018-07-16 PROCEDURE — 81001 URINALYSIS AUTO W/SCOPE: CPT

## 2018-07-16 PROCEDURE — 87086 URINE CULTURE/COLONY COUNT: CPT

## 2018-07-16 PROCEDURE — 99204 OFFICE O/P NEW MOD 45 MIN: CPT | Performed by: NURSE PRACTITIONER

## 2018-07-16 NOTE — PROGRESS NOTES
Bladderscan performed in office today:  Pt voided - >20 mL, PVR - 0 mL
Microscopic    Urine culture clean catch    CT ABDOMEN PELVIS W WO CONTRAST Additional Contrast? None   3. Abnormal CT scan, pelvis  CT ABDOMEN PELVIS W WO CONTRAST Additional Contrast? None         PLAN:  We will check a CT urogram.  We will repeat the UA C&S today.   She will return for cystoscopy and pelvic exam.  We will obtain records from previous urologist and gynecologist.

## 2018-07-17 LAB
CULTURE: NORMAL
Lab: NORMAL
SPECIMEN DESCRIPTION: NORMAL
STATUS: NORMAL

## 2018-07-18 ASSESSMENT — ENCOUNTER SYMPTOMS
COLOR CHANGE: 0
NAUSEA: 0
EYE REDNESS: 0
COUGH: 0
ABDOMINAL PAIN: 0
BACK PAIN: 0
CONSTIPATION: 0
WHEEZING: 0
EYE PAIN: 0
VOMITING: 0
SHORTNESS OF BREATH: 0

## 2018-07-26 ENCOUNTER — HOSPITAL ENCOUNTER (OUTPATIENT)
Dept: CT IMAGING | Age: 53
Discharge: HOME | End: 2018-07-28
Payer: COMMERCIAL

## 2018-07-26 DIAGNOSIS — R93.5 ABNORMAL CT SCAN, PELVIS: ICD-10-CM

## 2018-07-26 DIAGNOSIS — Q62.5 DUPLICATED LEFT RENAL COLLECTING SYSTEM: ICD-10-CM

## 2018-07-26 PROCEDURE — 6360000004 HC RX CONTRAST MEDICATION: Performed by: NURSE PRACTITIONER

## 2018-07-26 PROCEDURE — 74178 CT ABD&PLV WO CNTR FLWD CNTR: CPT

## 2018-07-26 RX ADMIN — IOPAMIDOL 100 ML: 612 INJECTION, SOLUTION INTRAVENOUS at 14:50

## 2018-08-06 ENCOUNTER — PROCEDURE VISIT (OUTPATIENT)
Dept: UROLOGY | Age: 53
End: 2018-08-06
Payer: COMMERCIAL

## 2018-08-06 VITALS — BODY MASS INDEX: 42.18 KG/M2 | SYSTOLIC BLOOD PRESSURE: 106 MMHG | WEIGHT: 293 LBS | DIASTOLIC BLOOD PRESSURE: 78 MMHG

## 2018-08-06 DIAGNOSIS — N28.89 URETEROCELE: ICD-10-CM

## 2018-08-06 DIAGNOSIS — N39.0 FREQUENT UTI: Primary | ICD-10-CM

## 2018-08-06 PROCEDURE — G8427 DOCREV CUR MEDS BY ELIG CLIN: HCPCS | Performed by: UROLOGY

## 2018-08-06 PROCEDURE — 52000 CYSTOURETHROSCOPY: CPT | Performed by: UROLOGY

## 2018-08-06 PROCEDURE — 99213 OFFICE O/P EST LOW 20 MIN: CPT | Performed by: UROLOGY

## 2018-08-06 PROCEDURE — 3017F COLORECTAL CA SCREEN DOC REV: CPT | Performed by: UROLOGY

## 2018-08-06 PROCEDURE — 1036F TOBACCO NON-USER: CPT | Performed by: UROLOGY

## 2018-08-06 PROCEDURE — G8417 CALC BMI ABV UP PARAM F/U: HCPCS | Performed by: UROLOGY

## 2018-08-06 ASSESSMENT — ENCOUNTER SYMPTOMS
BACK PAIN: 0
EYE PAIN: 0
ABDOMINAL PAIN: 0
COUGH: 0
COLOR CHANGE: 0
NAUSEA: 0
VOMITING: 0
SHORTNESS OF BREATH: 0
WHEEZING: 0
EYE REDNESS: 0

## 2018-08-07 ENCOUNTER — HOSPITAL ENCOUNTER (OUTPATIENT)
Age: 53
Discharge: HOME | End: 2018-08-07
Payer: COMMERCIAL

## 2018-08-07 ENCOUNTER — TELEPHONE (OUTPATIENT)
Dept: UROLOGY | Age: 53
End: 2018-08-07

## 2018-08-07 DIAGNOSIS — R30.0 DYSURIA: Primary | ICD-10-CM

## 2018-08-07 DIAGNOSIS — R30.0 DYSURIA: ICD-10-CM

## 2018-08-07 LAB
-: ABNORMAL
AMORPHOUS: ABNORMAL
BACTERIA: ABNORMAL
BILIRUBIN URINE: NEGATIVE
CASTS UA: ABNORMAL /LPF
COLOR: YELLOW
COMMENT UA: ABNORMAL
CRYSTALS, UA: ABNORMAL /HPF
EPITHELIAL CELLS UA: ABNORMAL /HPF
GLUCOSE URINE: NEGATIVE
KETONES, URINE: NEGATIVE
LEUKOCYTE ESTERASE, URINE: ABNORMAL
MUCUS: ABNORMAL
NITRITE, URINE: POSITIVE
OTHER OBSERVATIONS UA: ABNORMAL
PH UA: 5 (ref 5–8)
PROTEIN UA: ABNORMAL
RBC UA: ABNORMAL /HPF (ref 0–2)
RENAL EPITHELIAL, UA: ABNORMAL /HPF
SPECIFIC GRAVITY UA: 1.02 (ref 1–1.03)
TRICHOMONAS: ABNORMAL
TURBIDITY: ABNORMAL
URINE HGB: ABNORMAL
UROBILINOGEN, URINE: NORMAL
WBC UA: ABNORMAL /HPF
YEAST: ABNORMAL

## 2018-08-07 PROCEDURE — 87086 URINE CULTURE/COLONY COUNT: CPT

## 2018-08-07 PROCEDURE — 87088 URINE BACTERIA CULTURE: CPT

## 2018-08-07 PROCEDURE — 87186 SC STD MICRODIL/AGAR DIL: CPT

## 2018-08-07 PROCEDURE — 81001 URINALYSIS AUTO W/SCOPE: CPT

## 2018-08-07 RX ORDER — CIPROFLOXACIN 500 MG/1
500 TABLET, FILM COATED ORAL 2 TIMES DAILY
Qty: 20 TABLET | Refills: 0 | Status: SHIPPED | OUTPATIENT
Start: 2018-08-07 | End: 2018-08-17

## 2018-08-08 LAB
CULTURE: ABNORMAL
Lab: ABNORMAL
ORGANISM: ABNORMAL
SPECIMEN DESCRIPTION: ABNORMAL
STATUS: ABNORMAL

## 2018-09-12 ENCOUNTER — OFFICE VISIT (OUTPATIENT)
Dept: FAMILY MEDICINE CLINIC | Age: 53
End: 2018-09-12
Payer: COMMERCIAL

## 2018-09-12 VITALS
HEIGHT: 70 IN | OXYGEN SATURATION: 98 % | DIASTOLIC BLOOD PRESSURE: 80 MMHG | SYSTOLIC BLOOD PRESSURE: 120 MMHG | HEART RATE: 78 BPM | BODY MASS INDEX: 41.52 KG/M2 | WEIGHT: 290 LBS

## 2018-09-12 DIAGNOSIS — J30.9 ALLERGIC CONJUNCTIVITIS OF BOTH EYES AND RHINITIS: Primary | ICD-10-CM

## 2018-09-12 DIAGNOSIS — R73.09 ELEVATED GLUCOSE: ICD-10-CM

## 2018-09-12 DIAGNOSIS — Z12.39 ENCOUNTER FOR SCREENING FOR MALIGNANT NEOPLASM OF BREAST: ICD-10-CM

## 2018-09-12 DIAGNOSIS — Z12.11 SCREENING FOR COLORECTAL CANCER: ICD-10-CM

## 2018-09-12 DIAGNOSIS — H10.13 ALLERGIC CONJUNCTIVITIS OF BOTH EYES AND RHINITIS: Primary | ICD-10-CM

## 2018-09-12 DIAGNOSIS — Z12.12 SCREENING FOR COLORECTAL CANCER: ICD-10-CM

## 2018-09-12 DIAGNOSIS — R45.89 DEPRESSED MOOD: ICD-10-CM

## 2018-09-12 LAB — HBA1C MFR BLD: 5.7 %

## 2018-09-12 PROCEDURE — 83036 HEMOGLOBIN GLYCOSYLATED A1C: CPT | Performed by: FAMILY MEDICINE

## 2018-09-12 PROCEDURE — 99213 OFFICE O/P EST LOW 20 MIN: CPT | Performed by: FAMILY MEDICINE

## 2018-09-12 PROCEDURE — 3017F COLORECTAL CA SCREEN DOC REV: CPT | Performed by: FAMILY MEDICINE

## 2018-09-12 PROCEDURE — G8417 CALC BMI ABV UP PARAM F/U: HCPCS | Performed by: FAMILY MEDICINE

## 2018-09-12 PROCEDURE — 1036F TOBACCO NON-USER: CPT | Performed by: FAMILY MEDICINE

## 2018-09-12 PROCEDURE — G8427 DOCREV CUR MEDS BY ELIG CLIN: HCPCS | Performed by: FAMILY MEDICINE

## 2018-09-12 RX ORDER — FLUTICASONE PROPIONATE 50 MCG
2 SPRAY, SUSPENSION (ML) NASAL DAILY
Qty: 1 BOTTLE | Refills: 3 | Status: SHIPPED | OUTPATIENT
Start: 2018-09-12 | End: 2019-05-16 | Stop reason: SDUPTHER

## 2018-09-12 RX ORDER — NAPROXEN 500 MG/1
TABLET ORAL
COMMUNITY
Start: 2018-08-21 | End: 2019-03-08 | Stop reason: ALTCHOICE

## 2018-09-12 ASSESSMENT — PATIENT HEALTH QUESTIONNAIRE - PHQ9
SUM OF ALL RESPONSES TO PHQ QUESTIONS 1-9: 0
SUM OF ALL RESPONSES TO PHQ QUESTIONS 1-9: 0
SUM OF ALL RESPONSES TO PHQ9 QUESTIONS 1 & 2: 0
1. LITTLE INTEREST OR PLEASURE IN DOING THINGS: 0
2. FEELING DOWN, DEPRESSED OR HOPELESS: 0

## 2018-09-12 ASSESSMENT — ENCOUNTER SYMPTOMS
GASTROINTESTINAL NEGATIVE: 1
SHORTNESS OF BREATH: 0
COUGH: 1

## 2018-09-12 NOTE — PROGRESS NOTES
Value Date    CHOL 169 2014    HDL 43 2014    LABA1C 5.7 2018         Assessment & Plan:        Diagnosis Orders   1. Allergic conjunctivitis of both eyes and rhinitis     2. Depressed mood     3. Encounter for screening for malignant neoplasm of breast  BRIANA DIGITAL SCREEN W CAD BILATERAL   4. Screening for colorectal cancer  POCT Fecal Immunochemical Test (FIT)   5. Elevated glucose  POCT glycosylated hemoglobin (Hb A1C)   irritation of nose and katie eyes d/t sawdust exposure, poor air quality. Advised continuing to use safety glasses and start using flonase. Advised in proper use. Consider antihistamine eye gtt also. Recently depressed mood, didn't tolerate zoloft, doing a lot better now. Advised if problems recur, can consider other med. Also advised plenty of sunlight exposure going into the winter, consider light therapy lamp for home. Requested Prescriptions     Signed Prescriptions Disp Refills    fluticasone (FLONASE) 50 MCG/ACT nasal spray 1 Bottle 3     Si sprays by Nasal route daily       Patient Instructions     SURVEY:    You may be receiving a survey from [employer] regarding your visit today. Please complete the survey to enable us to provide the highest quality of care to you and your family. If you cannot score us as very good on any question, please call the office to discuss how we could have made your experience exceptional.     Thank you. Felisha Chao received counseling on the following healthy behaviors: medication adherence  Reviewed prior labs and health maintenance. Continue current medications, diet and exercise. Discussed use, benefit, and side effects of prescribed medications. Barriers to medication compliance addressed. Patient given educational materials - see patient instructions. All patient questions answered. Patient voiced understanding.      Electronically signed by Cj Hummel DO on 2018 at 8:46 PM   [address]

## 2018-09-12 NOTE — PATIENT INSTRUCTIONS
SURVEY:    You may be receiving a survey from [facility] regarding your visit today. Please complete the survey to enable us to provide the highest quality of care to you and your family. If you cannot score us as very good on any question, please call the office to discuss how we could have made your experience exceptional.     Thank you.

## 2018-12-10 ENCOUNTER — OFFICE VISIT (OUTPATIENT)
Dept: UROLOGY | Age: 53
End: 2018-12-10
Payer: COMMERCIAL

## 2018-12-10 VITALS — SYSTOLIC BLOOD PRESSURE: 140 MMHG | WEIGHT: 293 LBS | BODY MASS INDEX: 42.47 KG/M2 | DIASTOLIC BLOOD PRESSURE: 86 MMHG

## 2018-12-10 DIAGNOSIS — N39.0 FREQUENT UTI: Primary | ICD-10-CM

## 2018-12-10 PROCEDURE — G8417 CALC BMI ABV UP PARAM F/U: HCPCS | Performed by: NURSE PRACTITIONER

## 2018-12-10 PROCEDURE — G8484 FLU IMMUNIZE NO ADMIN: HCPCS | Performed by: NURSE PRACTITIONER

## 2018-12-10 PROCEDURE — G8427 DOCREV CUR MEDS BY ELIG CLIN: HCPCS | Performed by: NURSE PRACTITIONER

## 2018-12-10 PROCEDURE — 1036F TOBACCO NON-USER: CPT | Performed by: NURSE PRACTITIONER

## 2018-12-10 PROCEDURE — 3017F COLORECTAL CA SCREEN DOC REV: CPT | Performed by: NURSE PRACTITIONER

## 2018-12-10 PROCEDURE — 99213 OFFICE O/P EST LOW 20 MIN: CPT | Performed by: NURSE PRACTITIONER

## 2018-12-10 RX ORDER — NYSTATIN 100000 [USP'U]/G
POWDER TOPICAL
Qty: 45 G | Refills: 0 | Status: SHIPPED | OUTPATIENT
Start: 2018-12-10 | End: 2019-03-08 | Stop reason: ALTCHOICE

## 2018-12-10 ASSESSMENT — ENCOUNTER SYMPTOMS
COLOR CHANGE: 0
SHORTNESS OF BREATH: 0
VOMITING: 0
EYE PAIN: 0
COUGH: 0
NAUSEA: 0
ABDOMINAL PAIN: 0
BACK PAIN: 0
EYE REDNESS: 0
WHEEZING: 0
CONSTIPATION: 0

## 2018-12-10 NOTE — PROGRESS NOTES
Negative for abdominal pain, constipation, nausea and vomiting. Genitourinary: Negative for difficulty urinating, dysuria, flank pain, frequency, hematuria, pelvic pain, urgency, vaginal bleeding and vaginal discharge. Musculoskeletal: Negative for back pain, joint swelling and myalgias. Skin: Negative for color change, rash and wound. Neurological: Negative for dizziness, tremors and numbness. Hematological: Negative for adenopathy. Does not bruise/bleed easily. Objective:   Physical Exam    Assessment:       Diagnosis Orders   1. Frequent UTI             Plan:      I urged her to decrease her sodium intake and drink more water. I did explain to her that soda is a bladder irritant and increasing her water intake will help prevent future urinary tract infections. We will see her back in August or sooner if needed for new or worsening symptoms.         Alejo Cast, ERNESTO - CNP

## 2019-03-08 ENCOUNTER — OFFICE VISIT (OUTPATIENT)
Dept: FAMILY MEDICINE CLINIC | Age: 54
End: 2019-03-08
Payer: COMMERCIAL

## 2019-03-08 ENCOUNTER — HOSPITAL ENCOUNTER (OUTPATIENT)
Age: 54
Discharge: HOME | End: 2019-03-08
Payer: COMMERCIAL

## 2019-03-08 VITALS
OXYGEN SATURATION: 98 % | DIASTOLIC BLOOD PRESSURE: 84 MMHG | BODY MASS INDEX: 42.18 KG/M2 | TEMPERATURE: 97.6 F | HEART RATE: 84 BPM | WEIGHT: 293 LBS | SYSTOLIC BLOOD PRESSURE: 122 MMHG

## 2019-03-08 DIAGNOSIS — R80.9 PROTEINURIA, UNSPECIFIED TYPE: ICD-10-CM

## 2019-03-08 DIAGNOSIS — N39.0 RECURRENT UTI: Primary | ICD-10-CM

## 2019-03-08 DIAGNOSIS — N39.0 RECURRENT UTI: ICD-10-CM

## 2019-03-08 LAB
ALBUMIN SERPL-MCNC: 4.7 G/DL (ref 3.5–5.2)
ALBUMIN/GLOBULIN RATIO: ABNORMAL (ref 1–2.5)
ALP BLD-CCNC: 83 U/L (ref 35–104)
ALT SERPL-CCNC: 24 U/L (ref 5–33)
ANION GAP SERPL CALCULATED.3IONS-SCNC: 11 MMOL/L (ref 9–17)
AST SERPL-CCNC: 14 U/L
BILIRUB SERPL-MCNC: 0.93 MG/DL (ref 0.3–1.2)
BILIRUBIN, POC: ABNORMAL
BLOOD URINE, POC: ABNORMAL
BUN BLDV-MCNC: 14 MG/DL (ref 6–20)
BUN/CREAT BLD: 22 (ref 9–20)
CALCIUM SERPL-MCNC: 9.5 MG/DL (ref 8.6–10.4)
CHLORIDE BLD-SCNC: 104 MMOL/L (ref 98–107)
CLARITY, POC: ABNORMAL
CO2: 24 MMOL/L (ref 20–31)
COLOR, POC: ABNORMAL
CREAT SERPL-MCNC: 0.64 MG/DL (ref 0.5–0.9)
GFR AFRICAN AMERICAN: >60 ML/MIN
GFR NON-AFRICAN AMERICAN: >60 ML/MIN
GFR SERPL CREATININE-BSD FRML MDRD: ABNORMAL ML/MIN/{1.73_M2}
GFR SERPL CREATININE-BSD FRML MDRD: ABNORMAL ML/MIN/{1.73_M2}
GLUCOSE BLD-MCNC: 106 MG/DL (ref 70–99)
GLUCOSE URINE, POC: ABNORMAL
KETONES, POC: ABNORMAL
LEUKOCYTE EST, POC: ABNORMAL
NITRITE, POC: ABNORMAL
PH, POC: 7
POTASSIUM SERPL-SCNC: 4.4 MMOL/L (ref 3.7–5.3)
PROTEIN, POC: >300
SODIUM BLD-SCNC: 139 MMOL/L (ref 135–144)
SPECIFIC GRAVITY, POC: >1.03
TOTAL PROTEIN: 7.5 G/DL (ref 6.4–8.3)
UROBILINOGEN, POC: 1

## 2019-03-08 PROCEDURE — 99213 OFFICE O/P EST LOW 20 MIN: CPT | Performed by: NURSE PRACTITIONER

## 2019-03-08 PROCEDURE — 87086 URINE CULTURE/COLONY COUNT: CPT

## 2019-03-08 PROCEDURE — 36415 COLL VENOUS BLD VENIPUNCTURE: CPT

## 2019-03-08 PROCEDURE — 87077 CULTURE AEROBIC IDENTIFY: CPT

## 2019-03-08 PROCEDURE — 80053 COMPREHEN METABOLIC PANEL: CPT

## 2019-03-08 PROCEDURE — 87186 SC STD MICRODIL/AGAR DIL: CPT

## 2019-03-08 PROCEDURE — 81002 URINALYSIS NONAUTO W/O SCOPE: CPT | Performed by: NURSE PRACTITIONER

## 2019-03-08 RX ORDER — CIPROFLOXACIN 500 MG/1
500 TABLET, FILM COATED ORAL 2 TIMES DAILY
Qty: 20 TABLET | Refills: 0 | Status: SHIPPED | OUTPATIENT
Start: 2019-03-08 | End: 2019-03-18

## 2019-03-08 ASSESSMENT — ENCOUNTER SYMPTOMS
DIARRHEA: 0
COUGH: 0
NAUSEA: 1
VOMITING: 0
SHORTNESS OF BREATH: 0

## 2019-03-09 LAB
CULTURE: ABNORMAL
Lab: ABNORMAL
SPECIMEN DESCRIPTION: ABNORMAL

## 2019-05-16 RX ORDER — FLUTICASONE PROPIONATE 50 MCG
SPRAY, SUSPENSION (ML) NASAL
Qty: 1 BOTTLE | Refills: 3 | Status: SHIPPED | OUTPATIENT
Start: 2019-05-16 | End: 2020-04-22

## 2019-05-28 ENCOUNTER — OFFICE VISIT (OUTPATIENT)
Dept: FAMILY MEDICINE CLINIC | Age: 54
End: 2019-05-28
Payer: COMMERCIAL

## 2019-05-28 VITALS
HEART RATE: 88 BPM | OXYGEN SATURATION: 98 % | BODY MASS INDEX: 42.04 KG/M2 | TEMPERATURE: 97.9 F | DIASTOLIC BLOOD PRESSURE: 82 MMHG | SYSTOLIC BLOOD PRESSURE: 128 MMHG | WEIGHT: 293 LBS

## 2019-05-28 DIAGNOSIS — J01.40 ACUTE NON-RECURRENT PANSINUSITIS: Primary | ICD-10-CM

## 2019-05-28 PROCEDURE — 99213 OFFICE O/P EST LOW 20 MIN: CPT | Performed by: NURSE PRACTITIONER

## 2019-05-28 RX ORDER — CEFDINIR 300 MG/1
300 CAPSULE ORAL 2 TIMES DAILY
Qty: 20 CAPSULE | Refills: 0 | Status: SHIPPED | OUTPATIENT
Start: 2019-05-28 | End: 2019-06-07

## 2019-05-28 ASSESSMENT — ENCOUNTER SYMPTOMS
SINUS PRESSURE: 1
SHORTNESS OF BREATH: 0
VOMITING: 0
SORE THROAT: 0
COUGH: 1
DIARRHEA: 0
HOARSE VOICE: 0
NAUSEA: 0

## 2019-05-28 NOTE — PROGRESS NOTES
HPI Notes    Name: Matheus Van  : 1965         Chief Complaint:     Chief Complaint   Patient presents with    Sinusitis     Patient complains of sinus pressure, more on right side. She has had cough x 2 weeks. No fever. She said she felt like it could be allergies. History of Present Illness:        Sinusitis   This is a new problem. The current episode started 1 to 4 weeks ago (worse over past 2 days). The problem has been gradually worsening since onset. There has been no fever. The pain is mild. Associated symptoms include congestion, coughing and sinus pressure. Pertinent negatives include no chills, headaches, hoarse voice, shortness of breath or sore throat. Past treatments include spray decongestants (mucinex sinus max). The treatment provided mild relief. Past Medical History:     Past Medical History:   Diagnosis Date    Bell's palsy 2013    Carpal tunnel syndrome     Chicken pox     Hydronephrosis     left     Hyperlipidemia 2013    Pneumonia     Renal atrophy     UTI (urinary tract infection)       Reviewed all health maintenance requirements and ordered appropriate tests  Health Maintenance Due   Topic Date Due    Hepatitis C screen  1965    HIV screen  1979    DTaP/Tdap/Td vaccine (1 - Tdap) 1983    Cervical cancer screen  1985    Shingles Vaccine (1 of 2) 2014    Colon cancer screen colonoscopy  2014    Breast cancer screen  2018    Lipid screen  2019       Past Surgical History:     Past Surgical History:   Procedure Laterality Date     SECTION      CYSTOSCOPY  2009    HYSTERECTOMY  2010    ROTATOR CUFF REPAIR Left 2017        Medications:       Prior to Admission medications    Medication Sig Start Date End Date Taking?  Authorizing Provider   cefdinir (OMNICEF) 300 MG capsule Take 1 capsule by mouth 2 times daily for 10 days 19 Yes ERNESTO Blunt - CARLOS fluticasone (FLONASE) 50 MCG/ACT nasal spray SPRAY 2 SPRAYS INTO EACH NOSTRIL EVERY DAY 5/16/19  Yes Tran Redmond MD        Allergies:       Sulfa antibiotics    Social History:     Tobacco:    reports that she has never smoked. She has never used smokeless tobacco.  Alcohol:      reports that she does not drink alcohol. Drug Use:  reports that she does not use drugs. Family History:        Family History   Problem Relation Age of Onset    Cancer Mother         breast    Heart Disease Mother     Stroke Mother     Cancer Father        Review of Systems:         Review of Systems   Constitutional: Negative for chills and fever. HENT: Positive for congestion and sinus pressure. Negative for hoarse voice and sore throat. Respiratory: Positive for cough. Negative for shortness of breath. Cardiovascular: Negative for chest pain and palpitations. Gastrointestinal: Negative for diarrhea, nausea and vomiting. Neurological: Negative for dizziness, seizures and headaches. Physical Exam:     Vitals:  /82   Pulse 88   Temp 97.9 °F (36.6 °C) (Oral)   Wt 293 lb (132.9 kg)   SpO2 98%   BMI 42.04 kg/m²       Physical Exam   Constitutional: She is oriented to person, place, and time. She appears well-developed and well-nourished. She does not appear ill. No distress. HENT:   Nose: Rhinorrhea present. Right sinus exhibits frontal sinus tenderness. Left sinus exhibits frontal sinus tenderness. Mouth/Throat: Posterior oropharyngeal erythema present. No oropharyngeal exudate. Behind nose TTP   Cardiovascular: Normal rate, regular rhythm, S1 normal and S2 normal.   Pulmonary/Chest: Effort normal and breath sounds normal. No respiratory distress. Neurological: She is alert and oriented to person, place, and time. Skin: Skin is warm and dry. Nursing note and vitals reviewed.             Data:     Lab Results   Component Value Date     03/08/2019    K 4.4 03/08/2019     03/08/2019    CO2 24 03/08/2019    BUN 14 03/08/2019    CREATININE 0.64 03/08/2019    GLUCOSE 106 03/08/2019    PROT 7.5 03/08/2019    LABALBU 4.7 03/08/2019    BILITOT 0.93 03/08/2019    ALKPHOS 83 03/08/2019    AST 14 03/08/2019    ALT 24 03/08/2019     Lab Results   Component Value Date    WBC 15.9 07/03/2018    RBC 5.02 07/03/2018    HGB 14.6 07/03/2018    HCT 43.3 07/03/2018    MCV 86.3 07/03/2018    MCH 29.1 07/03/2018    MCHC 33.8 07/03/2018    RDW 13.5 07/03/2018     07/03/2018    MPV NOT REPORTED 07/03/2018     Lab Results   Component Value Date    TSH 1.26 11/25/2013     Lab Results   Component Value Date    CHOL 169 02/03/2014    HDL 43 02/03/2014    LABA1C 5.7 09/12/2018          Assessment & Plan        Diagnosis Orders   1. Acute non-recurrent pansinusitis       Will treat with omnicef. Pt educated about medication. Continue flonase. May take zyrtec or claritin. Patient verbalizes understanding and agreement with plan. All questions answered. If symptoms do not resolve or worsen, return to office. Completed Refills   Requested Prescriptions     Signed Prescriptions Disp Refills    cefdinir (OMNICEF) 300 MG capsule 20 capsule 0     Sig: Take 1 capsule by mouth 2 times daily for 10 days     Return if symptoms worsen or fail to improve. Orders Placed This Encounter   Medications    cefdinir (OMNICEF) 300 MG capsule     Sig: Take 1 capsule by mouth 2 times daily for 10 days     Dispense:  20 capsule     Refill:  0     No orders of the defined types were placed in this encounter. Patient Instructions     SURVEY:    You may be receiving a survey from [employer] regarding your visit today. Please complete the survey to enable us to provide the highest quality of care to you and your family. If you cannot score us a very good on any question, please call the office to discuss how we could have made your experience a very good one. Thank you.       Electronically signed by ERNESTO Robison CNP on 5/28/2019 at 1:23 PM           Completed Refills      Requested Prescriptions     Signed Prescriptions Disp Refills    cefdinir (OMNICEF) 300 MG capsule 20 capsule 0     Sig: Take 1 capsule by mouth 2 times daily for 10 days         Clarissa Taoist received counseling on the following healthy behaviors: medication adherence  Reviewed prior labs and health maintenance. Continue current medications, diet and exercise. Discussed use, benefit, and side effects of prescribed medications. Barriers to medication compliance addressed. Patient given educational materials - see patient instructions. All patient questions answered. Patient voiced understanding.

## 2020-02-18 ENCOUNTER — HOSPITAL ENCOUNTER (OUTPATIENT)
Dept: VASCULAR LAB | Age: 55
Discharge: HOME | End: 2020-02-20
Payer: COMMERCIAL

## 2020-02-18 ENCOUNTER — OFFICE VISIT (OUTPATIENT)
Dept: FAMILY MEDICINE CLINIC | Age: 55
End: 2020-02-18
Payer: COMMERCIAL

## 2020-02-18 VITALS
OXYGEN SATURATION: 98 % | WEIGHT: 293 LBS | SYSTOLIC BLOOD PRESSURE: 130 MMHG | HEART RATE: 78 BPM | BODY MASS INDEX: 41.95 KG/M2 | HEIGHT: 70 IN | DIASTOLIC BLOOD PRESSURE: 38 MMHG

## 2020-02-18 PROCEDURE — 93971 EXTREMITY STUDY: CPT

## 2020-02-18 PROCEDURE — 99213 OFFICE O/P EST LOW 20 MIN: CPT | Performed by: FAMILY MEDICINE

## 2020-02-18 SDOH — ECONOMIC STABILITY: FOOD INSECURITY: WITHIN THE PAST 12 MONTHS, YOU WORRIED THAT YOUR FOOD WOULD RUN OUT BEFORE YOU GOT MONEY TO BUY MORE.: NEVER TRUE

## 2020-02-18 SDOH — ECONOMIC STABILITY: FOOD INSECURITY: WITHIN THE PAST 12 MONTHS, THE FOOD YOU BOUGHT JUST DIDN'T LAST AND YOU DIDN'T HAVE MONEY TO GET MORE.: NEVER TRUE

## 2020-02-18 SDOH — ECONOMIC STABILITY: INCOME INSECURITY: HOW HARD IS IT FOR YOU TO PAY FOR THE VERY BASICS LIKE FOOD, HOUSING, MEDICAL CARE, AND HEATING?: NOT HARD AT ALL

## 2020-02-18 ASSESSMENT — PATIENT HEALTH QUESTIONNAIRE - PHQ9
SUM OF ALL RESPONSES TO PHQ9 QUESTIONS 1 & 2: 0
SUM OF ALL RESPONSES TO PHQ QUESTIONS 1-9: 0
2. FEELING DOWN, DEPRESSED OR HOPELESS: 0
SUM OF ALL RESPONSES TO PHQ QUESTIONS 1-9: 0
1. LITTLE INTEREST OR PLEASURE IN DOING THINGS: 0

## 2020-02-18 ASSESSMENT — ENCOUNTER SYMPTOMS
VOMITING: 0
COLOR CHANGE: 0
DIARRHEA: 0

## 2020-02-18 NOTE — PATIENT INSTRUCTIONS
SURVEY:    You may be receiving a survey from [employer] regarding your visit today. Please complete the survey to enable us to provide the highest quality of care to you and your family. If you cannot score us a very good (5 stars) on any question, please call the office to discuss how we could have made your experience a very good one. Thank you.     Clinical Care Team:  MD Brandi Josue LPN    Clerical Team:  Yolanda Cates

## 2020-02-18 NOTE — PROGRESS NOTES
HPI Notes    Name: Yousuf Locke  : 1965        Chief Complaint:     Chief Complaint   Patient presents with    Knee Pain     Pt c/o left pain getting worse over the past 3 weeks, Pt states left knee aches all the time at times it's a sharp pain. No known injury. Pt tries to keep knee elevated, using Cheo Moles and taking tylenol. Nothing is helping. History of Present Illness:     Yousuf Locke is a 53 y.o.  female who presents with Knee Pain (Pt c/o left pain getting worse over the past 3 weeks, Pt states left knee aches all the time at times it's a sharp pain. No known injury. Pt tries to keep knee elevated, using Cheo Moles and taking tylenol. Nothing is helping.)      Knee Pain    There was no injury mechanism (Pain just started 3wks ago. Pt not sure what she did but she is always on the go. 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 But no known injury or fall. ). The pain is present in the left knee (Pt actually says the pain in more behind the knee in the upper calf region. ). The pain is moderate. The pain has been constant since onset. Pertinent negatives include no inability to bear weight, loss of motion, muscle weakness or numbness. Associated symptoms comments: Popping but mostly bothers her when plantar flexing like putting her boots on. NO pain going up and down steps. . The symptoms are aggravated by weight bearing (Pain isn't bad when she first gets up in the AM but worse once she is walking on it. ). Treatments tried: elevating, Cheo Moles, and tylenol, alleve (but not on a regular basis). The treatment provided mild relief.        Past Medical History:     Past Medical History:   Diagnosis Date    Bell's palsy 2013    Carpal tunnel syndrome     Chicken pox     Hydronephrosis 2009    left     Hyperlipidemia 2013    Pneumonia     Renal atrophy     UTI (urinary tract infection)       Reviewed all health maintenance requirements and and f/u. - VL DUP LOWER EXTREMITY VENOUS LEFT; Future        Return if symptoms worsen or fail to improve.       Electronically signed by Manuel Harry MD on 2/18/2020 at 10:43 AM

## 2020-05-06 ENCOUNTER — HOSPITAL ENCOUNTER (OUTPATIENT)
Dept: GENERAL RADIOLOGY | Age: 55
Discharge: HOME | End: 2020-05-08
Payer: COMMERCIAL

## 2020-05-06 ENCOUNTER — HOSPITAL ENCOUNTER (OUTPATIENT)
Age: 55
Discharge: HOME | End: 2020-05-08
Payer: COMMERCIAL

## 2020-05-06 ENCOUNTER — OFFICE VISIT (OUTPATIENT)
Dept: FAMILY MEDICINE CLINIC | Age: 55
End: 2020-05-06
Payer: COMMERCIAL

## 2020-05-06 VITALS — SYSTOLIC BLOOD PRESSURE: 120 MMHG | OXYGEN SATURATION: 98 % | DIASTOLIC BLOOD PRESSURE: 80 MMHG | HEART RATE: 88 BPM

## 2020-05-06 PROCEDURE — 73564 X-RAY EXAM KNEE 4 OR MORE: CPT

## 2020-05-06 PROCEDURE — 99213 OFFICE O/P EST LOW 20 MIN: CPT | Performed by: FAMILY MEDICINE

## 2020-05-06 NOTE — PROGRESS NOTES
HPI Notes    Name: Corona Barnett  : 1965        Chief Complaint:     Chief Complaint   Patient presents with    Knee Pain     Pt in office in [place] for c/o left calf pain. Doppler negative for DVT. Pt instructed to use heat/stretches take eleve BID. Pt c/o constant dull pain that keeps her awake at night. History of Present Illness:     Corona Barnett is a 53 y.o.  female who presents with Knee Pain (Pt in office in [place] for c/o left calf pain. Doppler negative for DVT. Pt instructed to use heat/stretches take eleve BID. Pt c/o constant dull pain that keeps her awake at night. )      Knee Pain    The pain is present in the left knee. The quality of the pain is described as aching. The pain is moderate. The pain has been constant (constant dull ache and \"just doesn't work\" and it has popping.) since onset. Associated symptoms include muscle weakness. Pertinent negatives include no inability to bear weight, numbness or tingling. Associated symptoms comments: occ it feels like it could give out on her. . She reports no foreign bodies present. The symptoms are aggravated by movement and weight bearing (Pt having difficulty going from sitting to standing position, so needs help getting up out of the recliner especially. Pain going up and down steps. ). 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 Heat makes it worse but ice helps. The treatment provided mild relief.        Past Medical History:     Past Medical History:   Diagnosis Date    Bell's palsy 2013    Carpal tunnel syndrome     Chicken pox     Hydronephrosis 2009    left     Hyperlipidemia 2013    Pneumonia     Renal atrophy     UTI (urinary tract infection)       Reviewed all health maintenance requirements and ordered appropriate tests  Health Maintenance Due   Topic Date Due    Hepatitis C screen  1965    HIV screen 1979    DTaP/Tdap/Td vaccine (1 - Tdap) 1983    Cervical cancer screen  1985    Shingles Vaccine (1 of 2) 2014    Colon cancer screen colonoscopy  2014    Breast cancer screen  2018    Lipid screen  2019    A1C test (Diabetic or Prediabetic)  2019       Past Surgical History:     Past Surgical History:   Procedure Laterality Date     SECTION      CYSTOSCOPY  2009    HYSTERECTOMY  2010    ROTATOR CUFF REPAIR Left 2017        Medications:       Prior to Admission medications    Medication Sig Start Date End Date Taking? Authorizing Provider   fluticasone (FLONASE) 50 MCG/ACT nasal spray USE 2 SPRAYS IN EACH NOSTRIL EVERY DAY  Patient not taking: Reported on 2020   Michel Evangelista MD        Allergies:       Sulfa antibiotics    Social History:     Tobacco:    reports that she has never smoked. She has never used smokeless tobacco.  Alcohol:      reports no history of alcohol use. Drug Use:  reports no history of drug use. Family History:     Family History   Problem Relation Age of Onset    Cancer Mother         breast    Heart Disease Mother     Stroke Mother     Cancer Father        Review of Systems:       Review of Systems   Constitutional: Negative for chills and fever. Musculoskeletal: Positive for arthralgias. Negative for joint swelling and myalgias. Lt knee   Skin: Negative for rash and wound. Neurological: Negative for tingling and numbness. Physical Exam:     Physical Exam  Vitals signs reviewed. Constitutional:       General: She is not in acute distress. Appearance: She is not ill-appearing. Musculoskeletal:         General: Tenderness present. No swelling or deformity. Left knee: She exhibits normal range of motion, no swelling, no effusion, no ecchymosis, no deformity, no erythema, no LCL laxity and no MCL laxity. Tenderness found.  Medial joint line and lateral joint line tenderness

## 2020-05-06 NOTE — PATIENT INSTRUCTIONS
SURVEY:    You may be receiving a survey from [employer] regarding your visit today. Please complete the survey to enable us to provide the highest quality of care to you and your family. If you cannot score us a very good (5 stars) on any question, please call the office to discuss how we could have made your experience a very good one. Thank you.     Clinical Care Team:  MD Aston Rodriguez LPN    Clerical Team:  Yolanda Guadalupe

## 2020-05-08 ENCOUNTER — HOSPITAL ENCOUNTER (OUTPATIENT)
Dept: PHYSICAL THERAPY | Age: 55
Setting detail: THERAPIES SERIES
Discharge: HOME | End: 2020-05-08
Payer: COMMERCIAL

## 2020-05-08 PROCEDURE — 97110 THERAPEUTIC EXERCISES: CPT

## 2020-05-08 PROCEDURE — 97161 PT EVAL LOW COMPLEX 20 MIN: CPT

## 2020-05-08 ASSESSMENT — PAIN SCALES - GENERAL: PAINLEVEL_OUTOF10: 4

## 2020-05-08 NOTE — PROGRESS NOTES
Phone: [phone]         Fax: [phone]                      Outpatient Physical Therapy                                                                      Evaluation    Date: 2020  Patient: Keerthi Lakhani  : 1965  ACCT #: [de-identified]    Referring Practitioner: Dr. Robert Carrillo    Referral Date : 20    Diagnosis: Acute pain Left knee    Treatment Diagnosis: L knee pain  Onset Date: 20  Total # of Visits Approved: 12 Per Physician Order  Total # of Visits to Date: 1  No Show: 0  Canceled Appointment: 0     Subjective  Additional Pertinent Hx: Pt reports that her pain started in Feb, incidious onset. Went to physician and was given some exercises but no noted improvement. Pt reports transfers(chair, toilet etc.)  Pt reports pain progresses as the day goes on. Pt reports uneven surface or less stable surface increases pain severity. Stairs are very painful. 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 Pt reports noted crepitus with stairs also.    Pain Screening  Patient Currently in Pain: Yes  Pain Assessment  Pain Assessment: 0-10  Pain Level: 4    Objective  Observation/Palpation  Posture: Fair  Palpation: Patellar crepitus with LAQs, lateral tracking patella, Mild joint swelling     Strength LLE  L Hip Flexion: 4/5  L Hip Extension: 3/5  L Hip ABduction: 3/5  L Knee Flexion: 4+/5  L Knee Extension: 4+/5  L Ankle Dorsiflexion: 5/5  AROM LLE (degrees)  L Knee Flexion 0-145: 110deg  L Knee Extension 0: 10degFN     AROM LLE (degrees)  L Knee Flexion 0-145: 110deg  L Knee Extension 0: 10degFN    Assessment  Body structures, Functions, Activity limitations: Decreased functional mobility , Decreased ADL status, Decreased ROM, Decreased strength, Decreased endurance, Increased pain, Decreased posture, Decreased balance, Decreased high-level IADLs  Assessment: Pt to benefit from ther ex for ROM of L knee and Hip Strengthening. Pt presents with lateral tracking patella and to benefit from VMO strengthening/recruitment. Pt to also benefit from balance and gait training. Prognosis: Good  Decision Making: Low Complexity  Exam: LEFS- 44/80    Clinical Presentation:  Stable/Uncomplicated  The Following Comorbities will impact the patients progression and Plan of Care:   Previous Orthopedic Injury/Surgery       Activity Tolerance: Patient Tolerated treatment well    Education: POC; HEP: Chey Debbi with LAQ, Quad Sets     Barriers to Learning: None    Goals  Short term goals  Time Frame for Short term goals: 6 visits  Short term goal 1: Pt to report independence and compliance with HEP. Short term goal 2: Pt to have neutral AROM knee ext to improve pt amb. Long term goals  Time Frame for Long term goals : 12 visits  Long term goal 1: Pt to score > 54/80 on LEFS to improve pt ADL nidhi. Long term goal 2: Pt to have 4+/5 hip ABD to improve pt stability and gait nidhi. Long term goal 3: Pt to maintain SLS x10sec on uneven surface 2:3 trials to improve pt safety outdoors and with chores. Long term goal 4: Pt to report worst pain x4 consecutive days 2/10 to improve ADL nidhi.     Patient's Goal:  Patient goals : Be able to walk and complete sit to stand transfers without pain    Timed Code Treatment Minutes: 10 Minutes  Total Treatment Time: 55  Time In: 1450  Time Out: [address], PT Date: 5/8/2020

## 2020-05-11 ENCOUNTER — HOSPITAL ENCOUNTER (OUTPATIENT)
Dept: PHYSICAL THERAPY | Age: 55
Setting detail: THERAPIES SERIES
Discharge: HOME | End: 2020-05-11
Payer: COMMERCIAL

## 2020-05-11 PROCEDURE — 97110 THERAPEUTIC EXERCISES: CPT

## 2020-05-11 ASSESSMENT — PAIN SCALES - GENERAL: PAINLEVEL_OUTOF10: 0

## 2020-05-14 ENCOUNTER — HOSPITAL ENCOUNTER (OUTPATIENT)
Dept: PHYSICAL THERAPY | Age: 55
Setting detail: THERAPIES SERIES
Discharge: HOME | End: 2020-05-14
Payer: COMMERCIAL

## 2020-05-14 PROCEDURE — G0283 ELEC STIM OTHER THAN WOUND: HCPCS

## 2020-05-14 PROCEDURE — 97110 THERAPEUTIC EXERCISES: CPT

## 2020-05-14 ASSESSMENT — PAIN SCALES - GENERAL: PAINLEVEL_OUTOF10: 5

## 2020-05-14 NOTE — PROGRESS NOTES
Phone: [phone]      Fax: [phone]                            Outpatient Physical Therapy                                                                            Daily Note    Date: 2020  Patient Name: Sj Epperson        MRN: [id number]   ACCT#:  [de-identified]  : 1965  (53 y.o.)    Referring Practitioner: Dr. Carter Sarmiento    Referral Date : 20    Diagnosis: Acute pain Left knee  Treatment Diagnosis: L knee pain    Onset Date: 20  Total # of Visits Approved: 12 Per Physician Order  Total # of Visits to Date: 3  No Show: 0  Canceled Appointment: 0    Pre-Treatment Pain:  5/10     Assessment  Assessment: Pt reports increased pain this date, 5/10. Pt reports increased soreness last day of PT that evening and next day. Iniated IFC/CP this date with good nidhi post exercise and no pain post session. Removed tape this date and will allow skin to rest.  May complete Todd taping next visit. Pt continues to demonstate L Hip ABD weakness with medial collapse with S/L activity. Chart Reviewed: Yes    Plan  Plan: Continue with current plan    Exercises/Modalities/Manual:  See DocFlow Sheet    Education: IFC, importance of icing for pain/swelling management at home. Barriers to Learning: None    Goals  (Total # of Visits to Date: 3)   Short Term Goals - Time Frame for Short term goals: 6 visits  Short term goal 1: Pt to report independence and compliance with HEP. Short term goal 2: Pt to have neutral AROM knee ext to improve pt amb. Long Term Goals - Time Frame for Long term goals : 12 visits  Long term goal 1: Pt to score > 54/80 on LEFS to improve pt ADL nidhi. Long term goal 2: Pt to have 4+/5 hip ABD to improve pt stability and gait nidhi. Long term goal 3: Pt to maintain SLS x10sec on uneven surface 2:3 trials to improve pt safety outdoors and with chores.   Long term goal 4: Pt to report worst pain x4 consecutive days 2/10 to

## 2020-05-18 ENCOUNTER — HOSPITAL ENCOUNTER (OUTPATIENT)
Dept: PHYSICAL THERAPY | Age: 55
Setting detail: THERAPIES SERIES
Discharge: HOME | End: 2020-05-18
Payer: COMMERCIAL

## 2020-05-18 PROCEDURE — 97110 THERAPEUTIC EXERCISES: CPT

## 2020-05-21 ENCOUNTER — HOSPITAL ENCOUNTER (OUTPATIENT)
Dept: PHYSICAL THERAPY | Age: 55
Setting detail: THERAPIES SERIES
Discharge: HOME | End: 2020-05-21
Payer: COMMERCIAL

## 2020-05-21 PROCEDURE — G0283 ELEC STIM OTHER THAN WOUND: HCPCS

## 2020-05-21 PROCEDURE — 97110 THERAPEUTIC EXERCISES: CPT

## 2020-05-21 NOTE — PROGRESS NOTES
Phone: [phone]      Fax: [phone]                            Outpatient Physical Therapy                                                                            Daily Note    Date: 2020  Patient Name: Shannon Mesa        MRN: [id number]   ACCT#:  [de-identified]  : 1965  (53 y.o.)    Referring Practitioner: Dr. Tika Rios    Referral Date : 20    Diagnosis: Acute pain Left knee  Treatment Diagnosis: L knee pain    Onset Date: 20  Total # of Visits Approved: 12 Per Physician Order  Total # of Visits to Date: 5  No Show: 0  Canceled Appointment: 0    Pre-Treatment Pain:  210     Assessment  Assessment: Pt reports evening pain persists, AM pain has been better 2/10 this AM.   AROM ext=3deg FN  Pt does note less crepitus since starting PT. Improved nidhi to car ride to [employer] also. Chart Reviewed: Yes    Plan  Plan: Continue with current plan    Exercises/Modalities/Manual:  See DocFlow Sheet    Education: Decreased crepitus and less medial collapse noted with therex this date. Barriers to Learning: None    Goals  (Total # of Visits to Date: 5)   Short Term Goals - Time Frame for Short term goals: 6 visits  Short term goal 1: Pt to report independence and compliance with HEP. Short term goal 2: Pt to have neutral AROM knee ext to improve pt amb. Long Term Goals - Time Frame for Long term goals : 12 visits  Long term goal 1: Pt to score > 54/80 on LEFS to improve pt ADL nidhi. Long term goal 2: Pt to have 4+/5 hip ABD to improve pt stability and gait nidhi. Long term goal 3: Pt to maintain SLS x10sec on uneven surface 2:3 trials to improve pt safety outdoors and with chores. Long term goal 4: Pt to report worst pain x4 consecutive days 2/10 to improve ADL nidhi.        Post Treatment Pain:  0/10    Time In: 0805  Time Out: 0850  Timed Code Treatment Minutes: 35 Minutes  Total Treatment Time: [address]

## 2020-05-27 ENCOUNTER — HOSPITAL ENCOUNTER (OUTPATIENT)
Dept: PHYSICAL THERAPY | Age: 55
Setting detail: THERAPIES SERIES
Discharge: HOME | End: 2020-05-27
Payer: COMMERCIAL

## 2020-05-27 PROCEDURE — G0283 ELEC STIM OTHER THAN WOUND: HCPCS

## 2020-05-27 PROCEDURE — 97110 THERAPEUTIC EXERCISES: CPT

## 2020-05-27 NOTE — PROGRESS NOTES
Phone: [address]      Fax: [phone]                            Outpatient Physical Therapy                                                                            Daily Note    Date: 2020  Patient Name: Laura Hicks        MRN: [id number]   ACCT#:  [de-identified]  : 1965  (53 y.o.)    Referring Practitioner: Dr. Ren Worthington    Referral Date : 20    Diagnosis: Acute pain Left knee  Treatment Diagnosis: L knee pain    Onset Date: 20  Total # of Visits Approved: 12 Per Physician Order  Total # of Visits to Date: 6  No Show: 0  Canceled Appointment: 0    Pre-Treatment Pain:  0/10     Assessment  Assessment: Pt reports no pain this morning but had pain /10 Sundy and Monday with unclear cause but possibly from mowing grass for 5 hours. Pt reports compliance with HEP. Full AROM to neutral extension . Performed ex/modalities as outlined for strengthening as well as pain and inflammation. Chart Reviewed: Yes  Education  Ed pt to take breaks when mowing or riding in car prolonged distance  And walk around to decrease painful episodes  Plan  Plan: Continue with current plan    Exercises/Modalities/Manual:  See DocFlow Sheet     Barriers to Learning: None    Goals  (Total # of Visits to Date: 6)   Short Term Goals - Time Frame for Short term goals: 6 visits  Short term goal 1: Pt to report independence and compliance with HEP. -met  Short term goal 2: Pt to have neutral AROM knee ext to improve pt amb.-met             Long Term Goals - Time Frame for Long term goals : 12 visits  Long term goal 1: Pt to score > 54/80 on LEFS to improve pt ADL nidhi. Long term goal 2: Pt to have 4+/5 hip ABD to improve pt stability and gait nidhi. Long term goal 3: Pt to maintain SLS x10sec on uneven surface 2:3 trials to improve pt safety outdoors and with chores. Long term goal 4: Pt to report worst pain x4 consecutive days 2/10 to improve ADL nidhi.        Post Treatment Pain:  0/10    Time In: 0900    Time Out : 0940        Timed Code Treatment Minutes: 33 Minutes  Total Treatment Time: 42 Minutes    Sumanth Posadas    PTA  Date: 5/27/2020

## 2020-05-29 ENCOUNTER — HOSPITAL ENCOUNTER (OUTPATIENT)
Dept: PHYSICAL THERAPY | Age: 55
Setting detail: THERAPIES SERIES
Discharge: HOME | End: 2020-05-29
Payer: COMMERCIAL

## 2020-05-29 PROCEDURE — G0283 ELEC STIM OTHER THAN WOUND: HCPCS

## 2020-05-29 PROCEDURE — 97110 THERAPEUTIC EXERCISES: CPT

## 2020-06-01 ENCOUNTER — APPOINTMENT (OUTPATIENT)
Dept: PHYSICAL THERAPY | Age: 55
End: 2020-06-01
Payer: COMMERCIAL

## 2020-06-04 ENCOUNTER — APPOINTMENT (OUTPATIENT)
Dept: PHYSICAL THERAPY | Age: 55
End: 2020-06-04
Payer: COMMERCIAL

## 2020-06-11 NOTE — PROGRESS NOTES
[facility]  Rehab and Wellness    Date: 2020  Patient Name: Jessie Biswas        : 1965     Called and left voicemail for pt to confirm D/C. Pt has not called with concerns but want to ensure no further PT needed before final D/C completed.     Noah Herrmann, PT Date: 2020

## 2020-06-19 ENCOUNTER — HOSPITAL ENCOUNTER (OUTPATIENT)
Dept: PHYSICAL THERAPY | Age: 55
Setting detail: THERAPIES SERIES
Discharge: HOME | End: 2020-06-19
Payer: COMMERCIAL

## 2020-06-19 PROCEDURE — 97140 MANUAL THERAPY 1/> REGIONS: CPT

## 2020-06-19 PROCEDURE — 97110 THERAPEUTIC EXERCISES: CPT

## 2020-06-19 NOTE — PROGRESS NOTES
Phone: Chase Gerard      Fax: [phone]                            Outpatient Physical Therapy                                                                            Daily Note    Date: 2020  Patient Name: Jayant Norton        MRN: [id number]   ACCT#:  [de-identified]  : 1965  (53 y.o.)    Referring Practitioner: Dr. Charito Pichardo    Referral Date : 20    Diagnosis: Acute pain Left knee  Treatment Diagnosis: L knee pain    Onset Date: 20  Total # of Visits Approved: 12 Per Physician Order  Total # of Visits to Date: 8  No Show: 0  Canceled Appointment: 0    Pre-Treatment Pain:  2/10     Assessment  Assessment: Pt reports continued crepitus with pain, weakness with stairs. Pt has been compliant with Ball squeeze and LAQ for HEP. Pt reports that swimming benefits the knee. AROM:2-122deg  Added Cupping framing lateral border of patella and patellar mobilzation medial.  Pt with R quad tightness, added PROM quad stretch and issued towel stretch for quad for HEP also. Plan to cont with new techniques x3 visits then may refer back to physician if pain persists. Chart Reviewed: Yes    Plan  Plan: Continue with current plan    Exercises/Modalities/Manual:  See DocFlow Sheet    Education: see assessment     Barriers to Learning: None    Goals  (Total # of Visits to Date: 8)   Short Term Goals - Time Frame for Short term goals: 6 visits  Short term goal 1: Pt to report independence and compliance with HEP. -met  Short term goal 2: Pt to have neutral AROM knee ext to improve pt amb.-met             Long Term Goals - Time Frame for Long term goals : 12 visits  Long term goal 1: Pt to score > 54/80 on LEFS to improve pt ADL nidhi.-met  Long term goal 2: Pt to have 4+/5 hip ABD to improve pt stability and gait nidhi.-Not Met  Long term goal 3: Pt to maintain SLS x10sec on uneven surface 2:3 trials to improve pt safety outdoors and with chores. -not met  Long term goal

## 2020-06-23 ENCOUNTER — HOSPITAL ENCOUNTER (OUTPATIENT)
Dept: PHYSICAL THERAPY | Age: 55
Setting detail: THERAPIES SERIES
Discharge: HOME | End: 2020-06-23
Payer: COMMERCIAL

## 2020-06-23 PROCEDURE — 97110 THERAPEUTIC EXERCISES: CPT

## 2020-06-23 PROCEDURE — 97140 MANUAL THERAPY 1/> REGIONS: CPT

## 2020-06-23 NOTE — PROGRESS NOTES
met  Long term goal 4: Pt to report worst pain x4 consecutive days 2/10 to improve ADL nidhi.-met       Post Treatment Pain:  0/10    Time In: 1035    Time Out : 1115   Timed Code Treatment Minutes: 40 Minutes  Total Treatment Time: 36 Minutes    Stephanie Bustillos [place]     Date: 6/23/2020

## 2020-06-26 ENCOUNTER — HOSPITAL ENCOUNTER (OUTPATIENT)
Dept: PHYSICAL THERAPY | Age: 55
Setting detail: THERAPIES SERIES
Discharge: HOME | End: 2020-06-26
Payer: COMMERCIAL

## 2020-06-26 PROCEDURE — 97110 THERAPEUTIC EXERCISES: CPT

## 2020-06-26 PROCEDURE — 97140 MANUAL THERAPY 1/> REGIONS: CPT

## 2020-06-29 ENCOUNTER — HOSPITAL ENCOUNTER (OUTPATIENT)
Dept: PHYSICAL THERAPY | Age: 55
Setting detail: THERAPIES SERIES
Discharge: HOME | End: 2020-06-29
Payer: COMMERCIAL

## 2020-06-29 PROCEDURE — 97140 MANUAL THERAPY 1/> REGIONS: CPT

## 2020-06-29 PROCEDURE — 97110 THERAPEUTIC EXERCISES: CPT

## 2020-06-29 ASSESSMENT — PAIN SCALES - GENERAL: PAINLEVEL_OUTOF10: 2

## 2020-07-01 ENCOUNTER — HOSPITAL ENCOUNTER (OUTPATIENT)
Dept: PHYSICAL THERAPY | Age: 55
Setting detail: THERAPIES SERIES
Discharge: HOME | End: 2020-07-01
Payer: COMMERCIAL

## 2020-07-01 PROCEDURE — 97140 MANUAL THERAPY 1/> REGIONS: CPT

## 2020-07-01 PROCEDURE — 97110 THERAPEUTIC EXERCISES: CPT

## 2020-07-01 ASSESSMENT — PAIN SCALES - GENERAL: PAINLEVEL_OUTOF10: 2

## 2020-07-01 NOTE — PROGRESS NOTES
Phone: [address]      Fax: [phone]                            Outpatient Physical Therapy                                                                            Daily Note    Date: 2020  Patient Name: Madonna Canales        MRN: [id number]   ACCT#:  [de-identified]  : 1965  (63 y.o.)    Referring Practitioner: Dr. Kandis Mcrae    Referral Date : 20    Diagnosis: Acute pain Left knee  Treatment Diagnosis: L knee pain    Onset Date: 20  Total # of Visits Approved: 12 Per Physician Order  Total # of Visits to Date: 12  No Show: 0  Canceled Appointment: 0    Pre-Treatment Pain:  2/10     Assessment  Assessment: Patient reports L knee pain is a 2/10 this morning. She notes following cupping her L knee usually feels pretty good, by 6 in the evening her knee begins to really bother her. Patient continues to have increased difficulty/pain with stairs. B/L hip ABD strength = 4+/5. LEFS = 56/80. Plan to have patient follow up with family physician due to continued L knee pain. Chart Reviewed: Yes    Plan  Plan: Discharge    Exercises/Modalities/Manual:  See DocFlow Sheet    Education:      Barriers to Learning: None    Goals  (Total # of Visits to Date: 15)   Short Term Goals - Time Frame for Short term goals: 6 visits.   Short term goal 1: Pt to report independence and compliance with HEP. - MET  Short term goal 2: Pt to have neutral AROM knee ext to improve pt amb. - MET             Long Term Goals - Time Frame for Long term goals : 12 visits  Long term goal 1: Pt to score > 54/80 on LEFS to improve pt ADL nidhi. - MET  Long term goal 2: Pt to have 4+/5 hip ABD to improve pt stability and gait nidhi. - MET  Long term goal 3: Pt to maintain SLS x10sec on uneven surface 2:3 trials to improve pt safety outdoors and with chores. - NOT MET  Long term goal 4: Pt to report worst pain x4 consecutive days 2/10 to improve ADL nidhi. - NOT MET       Post Treatment Pain: 0/10    Time In: 1022    Time Out : 1106   Timed Code Treatment Minutes: 45 Minutes  Total Treatment Time: [address]     Date: 7/1/2020

## 2020-07-01 NOTE — DISCHARGE SUMMARY
Phone: [address]             Fax: [phone]                            Outpatient Physical Therapy                                                                    Discharge Summary    Patient: Asha Yin  : 1965  ACCT #: [de-identified]   Referring Practitioner: Dr. Rosalind Estrada      Diagnosis: Acute pain Left knee    Date Treatment Initiated: 20  Date of Last Treatment: 20    PT Visit Information  Onset Date: 20  Total # of Visits Approved: 12  Total # of Visits to Date: 12  No Show: 0  Canceled Appointment: 0    Frequency/Duration  Days: 2 times per week  Weeks: 6 weeks    Treatment Received  Patient Education/HEP, Therapeutic Exercise, Manual Therapy: Myofacial Release/Cupping, Manual Therapy: Mobilization/Manipulation, Gait Training, HP/CP and Electrical Stimulation    Pain Level: 2    Assessment  Patient reports L knee pain persists despite therapy efforts. She notes following cupping her L knee usually feels pretty good, by 6 in the evening her knee begins to really bother her. Pt has received strengthening, as well as Estim/CP and manual therapy with some improvement but has plateaued. Patient continues to have increased difficulty/pain with stairs. B/L hip ABD strength = 4+/5. LEFS = 56/80. Recommend pt to follow up with PCP due to plateau reached with therapy efforts. Reason for Discharge  Completion of Prescribed visits and Optimal Function Achieved    Comments:   Thank you for this referral      Shaniqua Amaro  Date: 2020

## 2020-07-20 RX ORDER — FLUTICASONE PROPIONATE 50 MCG
SPRAY, SUSPENSION (ML) NASAL
Qty: 1 BOTTLE | Refills: 2 | Status: SHIPPED | OUTPATIENT
Start: 2020-07-20 | End: 2020-10-16

## 2020-08-07 ENCOUNTER — OFFICE VISIT (OUTPATIENT)
Dept: FAMILY MEDICINE CLINIC | Age: 55
End: 2020-08-07
Payer: COMMERCIAL

## 2020-08-07 ENCOUNTER — HOSPITAL ENCOUNTER (OUTPATIENT)
Age: 55
Discharge: HOME | End: 2020-08-07
Payer: COMMERCIAL

## 2020-08-07 VITALS
TEMPERATURE: 98.4 F | DIASTOLIC BLOOD PRESSURE: 88 MMHG | BODY MASS INDEX: 43.05 KG/M2 | WEIGHT: 293 LBS | SYSTOLIC BLOOD PRESSURE: 130 MMHG

## 2020-08-07 LAB
ALBUMIN SERPL-MCNC: 4.3 G/DL (ref 3.5–5.2)
ALBUMIN/GLOBULIN RATIO: ABNORMAL (ref 1–2.5)
ALP BLD-CCNC: 83 U/L (ref 35–104)
ALT SERPL-CCNC: 23 U/L (ref 5–33)
ANION GAP SERPL CALCULATED.3IONS-SCNC: 11 MMOL/L (ref 9–17)
AST SERPL-CCNC: 17 U/L
BILIRUB SERPL-MCNC: 0.81 MG/DL (ref 0.3–1.2)
BILIRUBIN, POC: NEGATIVE
BLOOD URINE, POC: NORMAL
BUN BLDV-MCNC: 15 MG/DL (ref 6–20)
BUN/CREAT BLD: 21 (ref 9–20)
CALCIUM SERPL-MCNC: 10 MG/DL (ref 8.6–10.4)
CHLORIDE BLD-SCNC: 103 MMOL/L (ref 98–107)
CHOLESTEROL/HDL RATIO: 6.3
CHOLESTEROL: 215 MG/DL
CLARITY, POC: CLEAR
CO2: 24 MMOL/L (ref 20–31)
COLOR, POC: NORMAL
CREAT SERPL-MCNC: 0.71 MG/DL (ref 0.5–0.9)
GFR AFRICAN AMERICAN: >60 ML/MIN
GFR NON-AFRICAN AMERICAN: >60 ML/MIN
GFR SERPL CREATININE-BSD FRML MDRD: ABNORMAL ML/MIN/{1.73_M2}
GFR SERPL CREATININE-BSD FRML MDRD: ABNORMAL ML/MIN/{1.73_M2}
GLUCOSE BLD-MCNC: 119 MG/DL (ref 70–99)
GLUCOSE URINE, POC: NEGATIVE
HCT VFR BLD CALC: 39.8 % (ref 36–46)
HDLC SERPL-MCNC: 34 MG/DL
HEMOGLOBIN: 13.6 G/DL (ref 12–16)
KETONES, POC: NEGATIVE
LDL CHOLESTEROL: 138 MG/DL (ref 0–130)
LEUKOCYTE EST, POC: NORMAL
MCH RBC QN AUTO: 29.5 PG (ref 26–34)
MCHC RBC AUTO-ENTMCNC: 34.3 G/DL (ref 31–37)
MCV RBC AUTO: 86.1 FL (ref 80–100)
NITRITE, POC: POSITIVE
NRBC AUTOMATED: ABNORMAL PER 100 WBC
PATIENT FASTING?: YES
PDW BLD-RTO: 13.9 % (ref 12.1–15.2)
PH, POC: 5.5
PLATELET # BLD: 319 K/UL (ref 140–450)
PMV BLD AUTO: ABNORMAL FL (ref 6–12)
POTASSIUM SERPL-SCNC: 4.2 MMOL/L (ref 3.7–5.3)
PROTEIN, POC: 100
RBC # BLD: 4.62 M/UL (ref 4–5.2)
RHEUMATOID FACTOR: <10 IU/ML
SEDIMENTATION RATE, ERYTHROCYTE: 4 MM (ref 0–30)
SODIUM BLD-SCNC: 138 MMOL/L (ref 135–144)
SPECIFIC GRAVITY, POC: 1.02
TOTAL PROTEIN: 7.6 G/DL (ref 6.4–8.3)
TRIGL SERPL-MCNC: 213 MG/DL
UROBILINOGEN, POC: 0.2
VLDLC SERPL CALC-MCNC: ABNORMAL MG/DL (ref 1–30)
WBC # BLD: 12.7 K/UL (ref 3.5–11)

## 2020-08-07 PROCEDURE — 87088 URINE BACTERIA CULTURE: CPT

## 2020-08-07 PROCEDURE — 86431 RHEUMATOID FACTOR QUANT: CPT

## 2020-08-07 PROCEDURE — 87086 URINE CULTURE/COLONY COUNT: CPT

## 2020-08-07 PROCEDURE — 87186 SC STD MICRODIL/AGAR DIL: CPT

## 2020-08-07 PROCEDURE — 85027 COMPLETE CBC AUTOMATED: CPT

## 2020-08-07 PROCEDURE — 99214 OFFICE O/P EST MOD 30 MIN: CPT | Performed by: NURSE PRACTITIONER

## 2020-08-07 PROCEDURE — 80061 LIPID PANEL: CPT

## 2020-08-07 PROCEDURE — 80053 COMPREHEN METABOLIC PANEL: CPT

## 2020-08-07 PROCEDURE — 85652 RBC SED RATE AUTOMATED: CPT

## 2020-08-07 PROCEDURE — 36415 COLL VENOUS BLD VENIPUNCTURE: CPT

## 2020-08-07 PROCEDURE — 81002 URINALYSIS NONAUTO W/O SCOPE: CPT | Performed by: NURSE PRACTITIONER

## 2020-08-07 RX ORDER — NITROFURANTOIN 25; 75 MG/1; MG/1
100 CAPSULE ORAL 2 TIMES DAILY
Qty: 14 CAPSULE | Refills: 0 | Status: SHIPPED | OUTPATIENT
Start: 2020-08-07 | End: 2020-08-14

## 2020-08-07 ASSESSMENT — ENCOUNTER SYMPTOMS
COUGH: 0
SHORTNESS OF BREATH: 0
NAUSEA: 0
BLOOD IN STOOL: 0
DIARRHEA: 0
VOMITING: 0

## 2020-08-07 NOTE — PATIENT INSTRUCTIONS
SURVEY:    You may be receiving a survey from [employer] regarding your visit today. Please complete the survey to enable us to provide the highest quality of care to you and your family. If you cannot score us a very good on any question, please call the office to discuss how we could have made your experience a very good one. Thank you.

## 2020-08-07 NOTE — PROGRESS NOTES
breath sounds. Abdominal:      General: Bowel sounds are normal.      Palpations: Abdomen is soft. Tenderness: There is no abdominal tenderness. Musculoskeletal:      Left knee: She exhibits normal range of motion, no swelling, no deformity and normal patellar mobility. Tenderness found. LCL tenderness noted. Comments: ROM normal at 0-140 degrees. Slight increased pain with compression behind knee. Slight tenderness to LCL with varum stress. Gait normal. No popping, grinding, or clicks. No tenderness to muscles of arms or legs with palpation. Skin:     General: Skin is warm and dry. Neurological:      Mental Status: She is alert and oriented to person, place, and time. Data:     Lab Results   Component Value Date     08/07/2020    K 4.2 08/07/2020     08/07/2020    CO2 24 08/07/2020    BUN 15 08/07/2020    CREATININE 0.71 08/07/2020    GLUCOSE 119 08/07/2020    PROT 7.6 08/07/2020    LABALBU 4.3 08/07/2020    BILITOT 0.81 08/07/2020    ALKPHOS 83 08/07/2020    AST 17 08/07/2020    ALT 23 08/07/2020     Lab Results   Component Value Date    WBC 12.7 08/07/2020    RBC 4.62 08/07/2020    HGB 13.6 08/07/2020    HCT 39.8 08/07/2020    MCV 86.1 08/07/2020    MCH 29.5 08/07/2020    MCHC 34.3 08/07/2020    RDW 13.9 08/07/2020     08/07/2020    MPV NOT REPORTED 08/07/2020     Lab Results   Component Value Date    TSH 1.26 11/25/2013     Lab Results   Component Value Date    CHOL 169 02/03/2014    HDL 43 02/03/2014    LABA1C 5.7 09/12/2018          Assessment & Plan        Diagnosis Orders   1. Recurrent left knee instability  --will refer to ortho. Pt has seen ortho in the past and would like to go back, but doesn't remember his name. Pt will call with physician name and I will order the referral.     2. Acute cystitis with hematuria  --UA + for nitrites, blood, leuk est. Review of chart shows previous culture was susceptible to macrobid.  Will start on macrobid and send urine for culture. POCT Urinalysis no Micro    Culture, Urine   3. Generalized body aches  --unknown etiology but it was a rapid onset. Will send for labs. Lipid Panel    CBC    Comprehensive Metabolic Panel    Rheumatoid Factor    Sedimentation Rate   4. Fatigue, unspecified type  Lipid Panel    CBC    Comprehensive Metabolic Panel    Rheumatoid Factor    Sedimentation Rate   5. Visit for screening mammogram  BRIANA DIGITAL SCREEN W OR WO CAD BILATERAL     Patient verbalizes understanding and agreement with plan. All questions answered. If symptoms do not resolve or worsen, return to office. Completed Refills   Requested Prescriptions     Signed Prescriptions Disp Refills    nitrofurantoin, macrocrystal-monohydrate, (MACROBID) 100 MG capsule 14 capsule 0     Sig: Take 1 capsule by mouth 2 times daily for 7 days     No follow-ups on file. Orders Placed This Encounter   Medications    nitrofurantoin, macrocrystal-monohydrate, (MACROBID) 100 MG capsule     Sig: Take 1 capsule by mouth 2 times daily for 7 days     Dispense:  14 capsule     Refill:  0     Orders Placed This Encounter   Procedures    Culture, Urine     Standing Status:   Future     Number of Occurrences:   1     Standing Expiration Date:   8/7/2021     Order Specific Question:   Specify (ex-cath, midstream, cysto, etc)?      Answer:   midstream    BRINAA DIGITAL SCREEN W OR WO CAD BILATERAL     Standing Status:   Future     Standing Expiration Date:   10/7/2021     Scheduling Instructions:      Do not wear deodorant,lotion or powder in underarm or chest area day of exam.    Lipid Panel     Standing Status:   Future     Number of Occurrences:   1     Standing Expiration Date:   8/7/2021     Order Specific Question:   Is Patient Fasting?/# of Hours     Answer:   10 - 12 hr    CBC     Standing Status:   Future     Number of Occurrences:   1     Standing Expiration Date:   8/7/2021    Comprehensive Metabolic Panel     Standing Status: Future     Number of Occurrences:   1     Standing Expiration Date:   9/11/2021    Rheumatoid Factor     Standing Status:   Future     Number of Occurrences:   1     Standing Expiration Date:   8/7/2021    Sedimentation Rate     Standing Status:   Future     Number of Occurrences:   1     Standing Expiration Date:   8/7/2021    POCT Urinalysis no Micro         Patient Instructions     SURVEY:    You may be receiving a survey from [employer] regarding your visit today. Please complete the survey to enable us to provide the highest quality of care to you and your family. If you cannot score us a very good on any question, please call the office to discuss how we could have made your experience a very good one. Thank you. Electronically signed by Alcario Barthel, APRN - CNP on 8/7/2020 at 10:33 AM           Completed Refills      Requested Prescriptions     Signed Prescriptions Disp Refills    nitrofurantoin, macrocrystal-monohydrate, (MACROBID) 100 MG capsule 14 capsule 0     Sig: Take 1 capsule by mouth 2 times daily for 7 days         Haven Bottoms received counseling on the following healthy behaviors: nutrition, exercise and medication adherence  Reviewed prior labs and health maintenance. Continue current medications, diet and exercise. Discussed use, benefit, and side effects of prescribed medications. Barriers to medication compliance addressed. Patient given educational materials - see patient instructions. All patient questions answered. Patient voiced understanding.

## 2020-08-08 LAB
CULTURE: ABNORMAL
Lab: ABNORMAL
SPECIMEN DESCRIPTION: ABNORMAL

## 2020-08-24 ENCOUNTER — TELEPHONE (OUTPATIENT)
Dept: FAMILY MEDICINE CLINIC | Age: 55
End: 2020-08-24

## 2020-08-24 RX ORDER — CIPROFLOXACIN 500 MG/1
500 TABLET, FILM COATED ORAL 2 TIMES DAILY
Qty: 14 TABLET | Refills: 0 | Status: SHIPPED | OUTPATIENT
Start: 2020-08-24 | End: 2020-08-31

## 2020-08-24 NOTE — TELEPHONE ENCOUNTER
Pt states she used to see urology and they told her she \" has extra tubes from her kidneys to her bladder and those don't drain all the way\"  Patient will start med and let us know if she has further issues

## 2020-08-24 NOTE — TELEPHONE ENCOUNTER
Diane Walden was in the office on 8/7 and had a UTI. She said she was on an antibiotic and felt better but now feels she has it again. She is having the back pain and said her urine is cloudy. She would like to know if something could just be called in for her. She said from where she works to [facility] is over an hour away. Health Maintenance   Topic Date Due    Hepatitis C screen  1965    HIV screen  06/25/1979    DTaP/Tdap/Td vaccine (1 - Tdap) 06/25/1983    Cervical cancer screen  06/25/1985    Shingles Vaccine (1 of 2) 06/25/2014    Colon cancer screen colonoscopy  06/25/2014    Breast cancer screen  12/28/2018    A1C test (Diabetic or Prediabetic)  09/12/2019    Flu vaccine (1) 09/01/2020    Lipid screen  08/07/2025    Hepatitis A vaccine  Aged Out    Hepatitis B vaccine  Aged Out    Hib vaccine  Aged Out    Meningococcal (ACWY) vaccine  Aged Out    Pneumococcal 0-64 years Vaccine  Aged Out             (applicable per patient's age: Cancer Screenings, Depression Screening, Fall Risk Screening, Immunizations)    Hemoglobin A1C (%)   Date Value   09/12/2018 5.7   11/25/2013 5.8     LDL Cholesterol (mg/dL)   Date Value   08/07/2020 138 (H)     LDL Calculated (mg/dL)   Date Value   02/03/2014 92     AST (U/L)   Date Value   08/07/2020 17     ALT (U/L)   Date Value   08/07/2020 23     BUN (mg/dL)   Date Value   08/07/2020 15      (goal A1C is < 7)   (goal LDL is <100) need 30-50% reduction from baseline     BP Readings from Last 3 Encounters:   08/07/20 130/88   05/06/20 120/80   02/18/20 (!) 130/38    (goal /80)      All Future Testing planned in [facility]:  Lab Frequency Next Occurrence   BRIANA DIGITAL SCREEN W OR WO CAD BILATERAL Once 09/07/2020       Next Visit Date:  No future appointments.          Patient Active Problem List:     Right foot pain     Bell's palsy     Hyperlipidemia     Fatigue     Frequent UTI     Ureterocele

## 2020-08-24 NOTE — TELEPHONE ENCOUNTER
We need to try and identify why she is getting them so frequently. I will send in Cipro (a little stronger than previous medication). If she gets one again, I would recommend a urology consult.

## 2020-09-05 ENCOUNTER — HOSPITAL ENCOUNTER (EMERGENCY)
Age: 55
Discharge: HOME | End: 2020-09-05
Attending: EMERGENCY MEDICINE
Payer: COMMERCIAL

## 2020-09-05 ENCOUNTER — APPOINTMENT (OUTPATIENT)
Dept: GENERAL RADIOLOGY | Age: 55
End: 2020-09-05
Payer: COMMERCIAL

## 2020-09-05 ENCOUNTER — HOSPITAL ENCOUNTER (OUTPATIENT)
Age: 55
Discharge: HOME | End: 2020-09-05
Payer: COMMERCIAL

## 2020-09-05 VITALS
DIASTOLIC BLOOD PRESSURE: 70 MMHG | HEIGHT: 70 IN | SYSTOLIC BLOOD PRESSURE: 112 MMHG | WEIGHT: 293 LBS | RESPIRATION RATE: 12 BRPM | OXYGEN SATURATION: 99 % | BODY MASS INDEX: 41.95 KG/M2 | TEMPERATURE: 98.5 F | HEART RATE: 76 BPM

## 2020-09-05 LAB
ABSOLUTE EOS #: 0.1 K/UL (ref 0–0.4)
ABSOLUTE IMMATURE GRANULOCYTE: NORMAL K/UL (ref 0–0.3)
ABSOLUTE LYMPH #: 1.8 K/UL (ref 1–4.8)
ABSOLUTE MONO #: 0.5 K/UL (ref 0–1)
ANION GAP SERPL CALCULATED.3IONS-SCNC: 11 MMOL/L (ref 9–17)
BASOPHILS # BLD: 1 % (ref 0–2)
BASOPHILS ABSOLUTE: 0.1 K/UL (ref 0–0.2)
BUN BLDV-MCNC: 15 MG/DL (ref 6–20)
BUN/CREAT BLD: 24 (ref 9–20)
CALCIUM SERPL-MCNC: 9.7 MG/DL (ref 8.6–10.4)
CHLORIDE BLD-SCNC: 104 MMOL/L (ref 98–107)
CO2: 23 MMOL/L (ref 20–31)
CREAT SERPL-MCNC: 0.62 MG/DL (ref 0.5–0.9)
DIFFERENTIAL TYPE: YES
EOSINOPHILS RELATIVE PERCENT: 2 % (ref 0–5)
GFR AFRICAN AMERICAN: >60 ML/MIN
GFR NON-AFRICAN AMERICAN: >60 ML/MIN
GFR SERPL CREATININE-BSD FRML MDRD: ABNORMAL ML/MIN/{1.73_M2}
GFR SERPL CREATININE-BSD FRML MDRD: ABNORMAL ML/MIN/{1.73_M2}
GLUCOSE BLD-MCNC: 103 MG/DL (ref 70–99)
HCT VFR BLD CALC: 42.8 % (ref 36–46)
HEMOGLOBIN: 14.3 G/DL (ref 12–16)
IMMATURE GRANULOCYTES: NORMAL %
LYMPHOCYTES # BLD: 26 % (ref 15–40)
MCH RBC QN AUTO: 28.8 PG (ref 26–34)
MCHC RBC AUTO-ENTMCNC: 33.5 G/DL (ref 31–37)
MCV RBC AUTO: 85.8 FL (ref 80–100)
MONOCYTES # BLD: 7 % (ref 4–8)
NRBC AUTOMATED: NORMAL PER 100 WBC
PDW BLD-RTO: 13.7 % (ref 12.1–15.2)
PLATELET # BLD: 326 K/UL (ref 140–450)
PLATELET ESTIMATE: NORMAL
PMV BLD AUTO: NORMAL FL (ref 6–12)
POTASSIUM SERPL-SCNC: 4.2 MMOL/L (ref 3.7–5.3)
RBC # BLD: 4.99 M/UL (ref 4–5.2)
RBC # BLD: NORMAL 10*6/UL
SEG NEUTROPHILS: 64 % (ref 47–75)
SEGMENTED NEUTROPHILS ABSOLUTE COUNT: 4.4 K/UL (ref 2.5–7)
SODIUM BLD-SCNC: 138 MMOL/L (ref 135–144)
TROPONIN INTERP: NORMAL
TROPONIN INTERP: NORMAL
TROPONIN T: <0.03 NG/ML
TROPONIN T: <0.03 NG/ML
TROPONIN, HIGH SENSITIVITY: NORMAL NG/L (ref 0–14)
TROPONIN, HIGH SENSITIVITY: NORMAL NG/L (ref 0–14)
TSH SERPL DL<=0.05 MIU/L-ACNC: 1.07 MIU/L (ref 0.3–5)
WBC # BLD: 7 K/UL (ref 3.5–11)
WBC # BLD: NORMAL 10*3/UL

## 2020-09-05 PROCEDURE — 99285 EMERGENCY DEPT VISIT HI MDM: CPT

## 2020-09-05 PROCEDURE — 85025 COMPLETE CBC W/AUTO DIFF WBC: CPT

## 2020-09-05 PROCEDURE — 71046 X-RAY EXAM CHEST 2 VIEWS: CPT

## 2020-09-05 PROCEDURE — 36415 COLL VENOUS BLD VENIPUNCTURE: CPT

## 2020-09-05 PROCEDURE — 84484 ASSAY OF TROPONIN QUANT: CPT

## 2020-09-05 PROCEDURE — 84443 ASSAY THYROID STIM HORMONE: CPT

## 2020-09-05 PROCEDURE — 93270 REMOTE 30 DAY ECG REV/REPORT: CPT

## 2020-09-05 PROCEDURE — 80048 BASIC METABOLIC PNL TOTAL CA: CPT

## 2020-09-05 PROCEDURE — 93005 ELECTROCARDIOGRAM TRACING: CPT | Performed by: EMERGENCY MEDICINE

## 2020-09-05 ASSESSMENT — ENCOUNTER SYMPTOMS
BACK PAIN: 0
VOMITING: 0
SHORTNESS OF BREATH: 0
WHEEZING: 0
EYE PAIN: 0
CHEST TIGHTNESS: 0
RHINORRHEA: 0
SORE THROAT: 0
ABDOMINAL PAIN: 0
TROUBLE SWALLOWING: 0
DIARRHEA: 0

## 2020-09-05 NOTE — ED PROVIDER NOTES
Genitourinary: Negative for dysuria and frequency. Musculoskeletal: Negative for back pain, myalgias, neck pain and neck stiffness. Skin: Negative for rash. Allergic/Immunologic: Negative for immunocompromised state. Neurological: Negative for dizziness, seizures, syncope and headaches. Hematological: Does not bruise/bleed easily. Psychiatric/Behavioral: Negative for confusion. All other systems reviewed and are negative.       PAST MEDICAL HISTORY     Past Medical History:   Diagnosis Date    Bell's palsy 2013    Carpal tunnel syndrome     Chicken pox     Hydronephrosis 2009    left     Hyperlipidemia 2013    Pneumonia     Renal atrophy     UTI (urinary tract infection)        SURGICAL HISTORY       Past Surgical History:   Procedure Laterality Date     SECTION      CYSTOSCOPY  2009    HYSTERECTOMY  2010    ROTATOR CUFF REPAIR Left 2017       CURRENT MEDICATIONS     [unfilled]    ALLERGIES     Sulfa antibiotics    FAMILY HISTORY       Family History   Problem Relation Age of Onset    Cancer Mother         breast    Heart Disease Mother     Stroke Mother     Cancer Father         SOCIAL HISTORY       Social History     Socioeconomic History    Marital status:      Spouse name: None    Number of children: None    Years of education: None    Highest education level: None   Occupational History    None   Social Needs    Financial resource strain: Not hard at all   [employer] insecurity     Worry: Never true     Inability: Never true   [employer] needs     Medical: None     Non-medical: None   Tobacco Use    Smoking status: Never Smoker    Smokeless tobacco: Never Used   Substance and Sexual Activity    Alcohol use: No     Alcohol/week: 0.0 standard drinks    Drug use: No    Sexual activity: None   Lifestyle    Physical activity     Days per week: None     Minutes per session: None    Stress: None   Relationships    Social connections     Talks on phone: None     Gets together: None     Attends Latter day service: None     Active member of club or organization: None     Attends meetings of clubs or organizations: None     Relationship status: None    Intimate partner violence     Fear of current or ex partner: None     Emotionally abused: None     Physically abused: None     Forced sexual activity: None   Other Topics Concern    None   Social History Narrative    None       SCREENINGS    Shlomo Coma Scale  Eye Opening: Spontaneous  Best Verbal Response: Oriented  Best Motor Response: Obeys commands  Welsh Coma Scale Score: 15      PHYSICAL EXAM    (up to 7 forlevel 4, 8 or more for level 5)     ED Triage Vitals   BP Temp Temp Source Pulse Resp SpO2 Height Weight   09/05/20 0958 09/05/20 0959 09/05/20 0959 -- 09/05/20 0958 09/05/20 0958 09/05/20 0959 09/05/20 0959   (!) 152/84 98.5 °F (36.9 °C) Oral  18 98 % 5' 10\" (1.778 m) 298 lb (135.2 kg)       Physical Exam  Vitals signs and nursing note reviewed. Constitutional:       General: She is not in acute distress. Appearance: She is well-developed. HENT:      Head: Normocephalic and atraumatic. Eyes:      General:         Right eye: No discharge. Left eye: No discharge. Pupils: Pupils are equal, round, and reactive to light. Neck:      Musculoskeletal: Normal range of motion and neck supple. Cardiovascular:      Rate and Rhythm: Normal rate and regular rhythm. Heart sounds: Normal heart sounds. No murmur. No friction rub. No gallop. Pulmonary:      Effort: Pulmonary effort is normal. No respiratory distress. Breath sounds: Normal breath sounds. No stridor. No wheezing or rales. Chest:      Chest wall: No tenderness. Abdominal:      General: Bowel sounds are normal. There is no distension. Palpations: Abdomen is soft. There is no mass. Tenderness: There is no abdominal tenderness. There is no guarding or rebound.    Musculoskeletal: Normal range of motion. General: No tenderness. Lymphadenopathy:      Cervical: No cervical adenopathy. Skin:     General: Skin is warm and dry. Findings: No erythema or rash. Neurological:      Mental Status: She is alert and oriented to person, place, and time. Cranial Nerves: No cranial nerve deficit. Coordination: Coordination normal.   Psychiatric:         Behavior: Behavior normal.         Thought Content: Thought content normal.         Judgment: Judgment normal.         DIAGNOSTIC RESULTS     EKG (Per Emergency Physician):   Normal sinus rhythm at a rate of 90, normal ST segments, normal T waves,     Repeat EKG: Normal sinus rhythm at a rate of 71, normal ST segments, normal T waves,     RADIOLOGY (Per Emergency Physician): Interpretation per the Radiologist below, ifavailable at the time of this note:  Xr Chest (2 Vw)    Result Date: 9/5/2020  EXAM: XR CHEST (2 VW) CLINICAL HISTORY: 63years old Female presenting with palpitations. TECHNIQUE: 2 view chest x-ray. COMPARISON: None. FINDINGS: No pneumothorax, pleural effusion or focal airspace consolidation. Stable 6 mm nodular opacity, suggestive of benign calcified nodule is seen in the right upper lobe. Heart is normal in size. Bony thorax is unremarkable. No acute cardiopulmonary process. ED BEDSIDE ULTRASOUND:   Performed by ED Physician - none    LABS:  Labs Reviewed   BASIC METABOLIC PANEL W/ REFLEX TO MG FOR LOW K - Abnormal; Notable for the following components:       Result Value    Glucose 103 (*)     Bun/Cre Ratio 24 (*)     All other components within normal limits   CBC WITH AUTO DIFFERENTIAL   TROPONIN   TSH WITHOUT REFLEX   TROPONIN        All other labs were within normal range or not returned as of this dictation.     EMERGENCY DEPARTMENT COURSE and DIFFERENTIALDIAGNOSIS/MDM:   Vitals:    Vitals:    09/05/20 1207 09/05/20 1227 09/05/20 1237 09/05/20 1252   BP: 128/75 (!) 141/74 136/71 (!) 146/75 Pulse: 74 73 73 72   Resp: 13 22 16 16   Temp:       TempSrc:       SpO2: 99% 99% 100% 100%   Weight:       Height:           Medications - No data to display    MDM. While on the monitor, patient had about a 10 beat run of SVT, rate in the 160s, caught on rhythm strip. Patient was symptomatic during this and now back to baseline. No further episodes in the ED, work-up is unremarkable here including 2 sets of cardiac enzymes and thyroid testing. Would like to catch more definitive episode for her to make sure there is no A. fib, make sure this is indeed SVT. Have arranged Holter monitor and she will follow-up with her PCP regarding this. We have discussed that if this is indeed SVT, vagal maneuvers that may break this but if those do not work she will need to return to the ED. REVAL:         CRITICAL CARE TIME   Total Critical Care time was 0 minutes, excluding separatelyreportable procedures. There was a high probability ofclinically significant/life threatening deterioration in the patient's condition which required my urgent intervention. CONSULTS:  [unfilled]    PROCEDURES:  Unless otherwise noted below, none     Procedures    FINAL IMPRESSION      1. Palpitations    2. Paroxysmal supraventricular tachycardia [facility])          DISPOSITION/PLAN   DISPOSITION Decision To Discharge 09/05/2020 01:44:42 PM      PATIENT REFERRED TO:  MD Lizz Chao [id number] [phone]    In 3 days        DISCHARGE MEDICATIONS:  New Prescriptions    No medications on file              Summation      Patient Course: Palpitations, suspected SVT based on patient's description and a very small rhythm strip, work-up here negative, will send out on Holter.     ED Medications administered this visit:  Medications - No data to display    New Prescriptions from this visit:    New Prescriptions    No medications on file       Follow-up:  MD Lizz Chao [phone]  [phone]    In 3 days          Final Impression:  1. Palpitations    2. Paroxysmal supraventricular tachycardia ([address]                   (Please note:  Portions of this note were completed with a voicerecognition program.  Efforts were made to edit the dictations but occasionally words and phrases are mis-transcribed.)  Form v2016. [id number].[id number]    Radha Bishop MD (electronically signed)  Emergency Medicine Provider            Radha Bishop MD  09/05/20 [id number] MD Genia  09/05/20 9059

## 2020-09-07 LAB
EKG ATRIAL RATE: 71 BPM
EKG ATRIAL RATE: 90 BPM
EKG P AXIS: 61 DEGREES
EKG P AXIS: 67 DEGREES
EKG P-R INTERVAL: 156 MS
EKG P-R INTERVAL: 158 MS
EKG Q-T INTERVAL: 372 MS
EKG Q-T INTERVAL: 394 MS
EKG QRS DURATION: 74 MS
EKG QRS DURATION: 74 MS
EKG QTC CALCULATION (BAZETT): 428 MS
EKG QTC CALCULATION (BAZETT): 455 MS
EKG R AXIS: 15 DEGREES
EKG R AXIS: 28 DEGREES
EKG T AXIS: 53 DEGREES
EKG T AXIS: 65 DEGREES
EKG VENTRICULAR RATE: 71 BPM
EKG VENTRICULAR RATE: 90 BPM

## 2020-09-07 PROCEDURE — 93010 ELECTROCARDIOGRAM REPORT: CPT | Performed by: INTERNAL MEDICINE

## 2020-10-08 NOTE — PROCEDURES
[place] [address]                                 EVENT MONITOR    PATIENT NAME: Lisette Mcnulty                     :        1965  MED REC NO:   [id number]                              ROOM:  ACCOUNT NO:   [de-identified]                           ADMIT DATE: 2020  PROVIDER:     Ericka Cole    NAME OF THE TEST:  Event recorder. INDICATION:  Palpitations. The patient wore an event recorder from 2020 to 10/04/2020. We  received a total of 9 rhythm strips. She remained in sinus rhythm throughout the recording. Her peak heart  rate was 165 with lowest heart rate of 50, average heart rate was 81. She had one run of 11 beats of SVT at 160 beats per minute. This lasted  for approximately 5 seconds. Otherwise, she remained in sinus rhythm  with occasional rare PACs and rare PVCs. IMPRESSION:  1. One short 11-beat run of SVT at 160 beats per minute. 2.  No significant bradycardia. 3.  No other arrhythmias noted. 4.  We would consider low dose beta-blocker, such as Toprol-XL 25 mg  daily or low dose Cardizem  mg daily depending on whether she is  symptomatic.         Yaw Valiente    D: 10/08/2020 6:18:30       T: 10/08/2020 7:49:15     [id number]  Job#: [id number]     Doc#: [id number]    CC:  Yahir Lieberman

## 2020-10-14 ENCOUNTER — HOSPITAL ENCOUNTER (OUTPATIENT)
Dept: WOMENS IMAGING | Age: 55
Discharge: HOME | End: 2020-10-16
Payer: COMMERCIAL

## 2020-10-14 PROCEDURE — 77063 BREAST TOMOSYNTHESIS BI: CPT

## 2020-10-16 RX ORDER — FLUTICASONE PROPIONATE 50 MCG
SPRAY, SUSPENSION (ML) NASAL
Qty: 1 BOTTLE | Refills: 2 | Status: SHIPPED | OUTPATIENT
Start: 2020-10-16 | End: 2021-01-12

## 2020-10-21 ENCOUNTER — OFFICE VISIT (OUTPATIENT)
Dept: FAMILY MEDICINE CLINIC | Age: 55
End: 2020-10-21
Payer: COMMERCIAL

## 2020-10-21 VITALS
DIASTOLIC BLOOD PRESSURE: 86 MMHG | HEART RATE: 76 BPM | WEIGHT: 293 LBS | HEIGHT: 70 IN | SYSTOLIC BLOOD PRESSURE: 126 MMHG | OXYGEN SATURATION: 96 % | BODY MASS INDEX: 41.95 KG/M2

## 2020-10-21 PROCEDURE — 99213 OFFICE O/P EST LOW 20 MIN: CPT | Performed by: FAMILY MEDICINE

## 2020-10-21 PROCEDURE — 90471 IMMUNIZATION ADMIN: CPT | Performed by: FAMILY MEDICINE

## 2020-10-21 PROCEDURE — 90686 IIV4 VACC NO PRSV 0.5 ML IM: CPT | Performed by: FAMILY MEDICINE

## 2020-10-21 RX ORDER — METOPROLOL SUCCINATE 25 MG/1
25 TABLET, EXTENDED RELEASE ORAL DAILY
Qty: 30 TABLET | Refills: 5 | Status: SHIPPED | OUTPATIENT
Start: 2020-10-21 | End: 2021-04-13

## 2020-10-21 ASSESSMENT — ENCOUNTER SYMPTOMS
VOMITING: 0
EYE DISCHARGE: 0
SHORTNESS OF BREATH: 0
COUGH: 0
EYE REDNESS: 0
DIARRHEA: 0

## 2020-10-21 NOTE — PROGRESS NOTES
HPI Notes    Name: David Pang  : 1965        Chief Complaint:     Chief Complaint   Patient presents with   [facility] ED Follow-up     20 [facility] ED, palpitations. Denies any recurring episodes    Results     f/u cardiac event monitor    Surgical Consult     discuss colonoscopy screening. To check with insurance regarding coverage of home study       History of Present Illness:     David Pang is a 63 y.o.  female who presents with ED Follow-up (20 [facility] ED, palpitations. Denies any recurring episodes); Results (f/u cardiac event monitor); and Surgical Consult (discuss colonoscopy screening. To check with insurance regarding coverage of home study)      Palpitations    This is a new problem. Episode onset: started about 2 mos with the 2 episodes of the palpitations. But went to Er on . Pt had normal labs and then wore Event monitor. Here to go over results. ... The problem has been resolved (pt hasn't noticed any changes or episodes when wearing the Event montior. Pt denies any more episodes even when told she had a run of SVT wearing the event monitor --- pt asymptomatic. ). Nothing aggravates the symptoms. Associated symptoms include dizziness and an irregular heartbeat. Pertinent negatives include no chest pain, coughing, fever, near-syncope, numbness, shortness of breath, syncope or vomiting. She has tried nothing for the symptoms. Snoring - Pt states she does snore. The snoring does seem to be gradually worse over time. Pt is concerned for sleep apnea as she is also tired a lot. Fatigue - Pt also complains she does not feel rested in the AM. Plus pt snores.    Past Medical History:     Past Medical History:   Diagnosis Date    Bell's palsy 2013    Carpal tunnel syndrome     Chicken pox     Hydronephrosis 2009    left     Hyperlipidemia 2013    Pneumonia     Renal atrophy     UTI (urinary tract infection)       Reviewed all health maintenance requirements and ordered appropriate tests  Health Maintenance Due   Topic Date Due    Hepatitis C screen  1965    HIV screen  1979    DTaP/Tdap/Td vaccine (1 - Tdap) 1983    Cervical cancer screen  1985    Shingles Vaccine (1 of 2) 2014    Colon cancer screen colonoscopy  2014    A1C test (Diabetic or Prediabetic)  2019       Past Surgical History:     Past Surgical History:   Procedure Laterality Date     SECTION      CYSTOSCOPY  2009    HYSTERECTOMY  2010    ROTATOR CUFF REPAIR Left 2017        Medications:       Prior to Admission medications    Medication Sig Start Date End Date Taking? Authorizing Provider   metoprolol succinate (TOPROL XL) 25 MG extended release tablet Take 1 tablet by mouth daily 10/21/20  Yes Reyna Perez MD   fluticasone [facility]) 50 MCG/ACT nasal spray USE 2 SPRAYS IN EACH NOSTRIL EVERYDAY 10/16/20  Yes ERNESTO Salcido CNP        Allergies:       Sulfa antibiotics    Social History:     Tobacco:    reports that she has never smoked. She has never used smokeless tobacco.  Alcohol:      reports no history of alcohol use. Drug Use:  reports no history of drug use. Family History:     Family History   Problem Relation Age of Onset    Cancer Mother         breast    Heart Disease Mother     Stroke Mother     Cancer Father        Review of Systems:       Review of Systems   Constitutional: Positive for fatigue. Negative for chills and fever. Eyes: Negative for discharge and redness. Respiratory: Negative for cough and shortness of breath. Snoring   Cardiovascular: Positive for palpitations. Negative for chest pain, syncope and near-syncope. Gastrointestinal: Negative for diarrhea and vomiting. Skin: Negative for pallor and rash. Neurological: Positive for dizziness. Negative for facial asymmetry and numbness. Physical Exam:     Physical Exam  Vitals signs reviewed.    Constitutional:       General: She is not in acute distress. Appearance: Normal appearance. She is well-developed. She is not ill-appearing. HENT:      Head: Normocephalic and atraumatic. Eyes:      General:         Right eye: No discharge. Left eye: No discharge. Conjunctiva/sclera: Conjunctivae normal.      Pupils: Pupils are equal, round, and reactive to light. Neck:      Musculoskeletal: Neck supple. Thyroid: No thyromegaly. Cardiovascular:      Rate and Rhythm: Normal rate and regular rhythm. Heart sounds: Normal heart sounds. No murmur. Pulmonary:      Effort: Pulmonary effort is normal. No respiratory distress. Breath sounds: Normal breath sounds. Abdominal:      General: Bowel sounds are normal. There is no distension. Palpations: Abdomen is soft. Tenderness: There is no abdominal tenderness. Musculoskeletal:      Right lower leg: No edema. Left lower leg: No edema. Lymphadenopathy:      Cervical: No cervical adenopathy. Skin:     Findings: No erythema or rash. Neurological:      General: No focal deficit present. Mental Status: She is alert.          Vitals:  /86   Pulse 76   Ht 5' 10\" (1.778 m)   Wt 299 lb (135.6 kg)   SpO2 96%   BMI 42.90 kg/m²       Data:     Lab Results   Component Value Date     09/05/2020    K 4.2 09/05/2020     09/05/2020    CO2 23 09/05/2020    BUN 15 09/05/2020    CREATININE 0.62 09/05/2020    GLUCOSE 103 09/05/2020    PROT 7.6 08/07/2020    LABALBU 4.3 08/07/2020    BILITOT 0.81 08/07/2020    ALKPHOS 83 08/07/2020    AST 17 08/07/2020    ALT 23 08/07/2020     Lab Results   Component Value Date    WBC 7.0 09/05/2020    RBC 4.99 09/05/2020    HGB 14.3 09/05/2020    HCT 42.8 09/05/2020    MCV 85.8 09/05/2020    MCH 28.8 09/05/2020    MCHC 33.5 09/05/2020    RDW 13.7 09/05/2020     09/05/2020    MPV NOT REPORTED 09/05/2020     Lab Results   Component Value Date    TSH 1.07 09/05/2020     Lab Results   Component Value Date    CHOL 215 08/07/2020    CHOL 169 02/03/2014    HDL 34 08/07/2020    LABA1C 5.7 09/12/2018          Assessment/Plan:        1. Palpitations  Reviewed with pt her event monitor results and recommendations per Dr Lubna Reyes to start a medications like toprol or cardizem. Will start toprol 25mg daily and reviewed with pt side effects and if gets side effects or more symptoms then call our office and will need a cardiology referral    2. Snoring  Pt to have a sleep study  - Sleep Study with PAP Titration; Future    3. Other fatigue  Sleeps study ordered. - Sleep Study with PAP Titration; Future    4. Need for influenza vaccination  Shot given  - INFLUENZA, QUADV, 3 YRS AND OLDER, IM PF, PREFILL SYR OR SDV, 0.5ML (ANGELIQUE Brady)    After d/w pt she has decided to wait on colonoscopy referral and will ck with her insurance to see if cover a Cologuard test for screening for colon cancer      Return if symptoms worsen or fail to improve.       Electronically signed by Nena Hightower MD on 10/21/2020 at 7:11 PM

## 2020-10-21 NOTE — PATIENT INSTRUCTIONS
Survey: You may be receiving a survey from [employer] regarding your visit today. You may get this in the mail, through your MyChart or in your email. Please complete the survey to enable us to provide the highest quality of care to you and your family. Please also, mention our names. If you cannot score us as very good (5 Stars) on any question, please feel free to call the office to discuss how we could have made your experience exceptional.      Thank You!         MD Shira Guevara LPN

## 2020-12-11 ENCOUNTER — HOSPITAL ENCOUNTER (OUTPATIENT)
Dept: LAB | Age: 55
Setting detail: SPECIMEN
Discharge: HOME | End: 2020-12-11
Payer: COMMERCIAL

## 2020-12-11 PROCEDURE — U0003 INFECTIOUS AGENT DETECTION BY NUCLEIC ACID (DNA OR RNA); SEVERE ACUTE RESPIRATORY SYNDROME CORONAVIRUS 2 (SARS-COV-2) (CORONAVIRUS DISEASE [COVID-19]), AMPLIFIED PROBE TECHNIQUE, MAKING USE OF HIGH THROUGHPUT TECHNOLOGIES AS DESCRIBED BY CMS-2020-01-R: HCPCS

## 2020-12-11 PROCEDURE — C9803 HOPD COVID-19 SPEC COLLECT: HCPCS

## 2020-12-13 LAB — SARS-COV-2, NAA: NOT DETECTED

## 2020-12-17 ENCOUNTER — HOSPITAL ENCOUNTER (OUTPATIENT)
Dept: SLEEP CENTER | Age: 55
Discharge: HOME | End: 2020-12-17
Payer: COMMERCIAL

## 2020-12-17 VITALS — BODY MASS INDEX: 41.95 KG/M2 | WEIGHT: 293 LBS | HEIGHT: 70 IN

## 2020-12-17 PROCEDURE — 95811 POLYSOM 6/>YRS CPAP 4/> PARM: CPT

## 2020-12-17 ASSESSMENT — SLEEP AND FATIGUE QUESTIONNAIRES
WHAT TIME DO YOU USUALLY WAKE UP: 22500
DO YOU HAVE HIGH BLOOD PRESSURE: NO
HOW MANY NAPS DO YOU TAKE PER WEEK: 0
HOW LIKELY ARE YOU TO NOD OFF OR FALL ASLEEP WHEN YOU ARE A PASSENGER IN A CAR FOR AN HOUR WITHOUT A BREAK: 0
HOW OFTEN DO YOU SNORE: ALMOST DAILY
SNORING VOLUME: VERY LOUD
HAVE YOU EVER NODDED OFF OR FALLEN ASLEEP WHILE DRIVING: NO
ARE YOU TIRED AFTER SLEEPING: ALMOST DAILY
FOR THE FIRST 30 MINUTES AFTER WAKING, I AM: VERY GROGGY
HOW LIKELY ARE YOU TO NOD OFF OR FALL ASLEEP WHILE SITTING AND TALKING TO SOMEONE: 0
HOW LIKELY ARE YOU TO NOD OFF OR FALL ASLEEP WHILE SITTING AND READING: 2
DO YOU SNORE: YES
DOES YOUR SNORING BOTHER OTHERS: YES
ARE YOU TIRED DURING WAKE TIME: ALMOST DAILY
NUMBER OF TIMES YOU WAKE PER NIGHT: 2
WHAT TIME DO YOU USUALLY GO TO BED: 82800
USUAL AMOUNT OF TIME TO FALL ASLEEP (MIN): 15
HOW LIKELY ARE YOU TO NOD OFF OR FALL ASLEEP IN A CAR, WHILE STOPPED FOR A FEW MINUTES IN TRAFFIC: 0
I SLEEP WELL: NO
I DO OR HAVE BEEN TOLD I SNORE DURING SLEEP: YES, AS AN ADULT OR ADOLESCENT
HAS ANYONE NOTICED THAT YOU QUIT BREATHING DURING SLEEP: NEVER/ALMOST NEVER
ESS TOTAL SCORE: 11
HOW LIKELY ARE YOU TO NOD OFF OR FALL ASLEEP WHILE SITTING INACTIVE IN A PUBLIC PLACE: 3
HOW LIKELY ARE YOU TO NOD OFF OR FALL ASLEEP WHILE LYING DOWN TO REST IN THE AFTERNOON WHEN CIRCUMSTANCES PERMIT: 0
NORMAL AMOUNT OF SLEEP PER NIGHT: -2
HOW LIKELY ARE YOU TO NOD OFF OR FALL ASLEEP WHILE WATCHING TV: 3
HOW LIKELY ARE YOU TO NOD OFF OR FALL ASLEEP WHILE SITTING QUIETLY AFTER LUNCH WITHOUT ALCOHOL: 3

## 2021-01-05 LAB — STATUS: NORMAL

## 2021-01-12 RX ORDER — FLUTICASONE PROPIONATE 50 MCG
SPRAY, SUSPENSION (ML) NASAL
Qty: 1 BOTTLE | Refills: 2 | Status: SHIPPED | OUTPATIENT
Start: 2021-01-12 | End: 2021-04-14

## 2021-01-12 NOTE — TELEPHONE ENCOUNTER
Last OV 10/21/20 for palpitations, fatigue  Requesting refill on flonase nasal spray thru [facility]

## 2021-03-18 ENCOUNTER — HOSPITAL ENCOUNTER (EMERGENCY)
Age: 56
Discharge: HOME | End: 2021-03-18
Attending: INTERNAL MEDICINE
Payer: COMMERCIAL

## 2021-03-18 VITALS
OXYGEN SATURATION: 98 % | BODY MASS INDEX: 44.05 KG/M2 | HEART RATE: 77 BPM | SYSTOLIC BLOOD PRESSURE: 147 MMHG | DIASTOLIC BLOOD PRESSURE: 65 MMHG | WEIGHT: 293 LBS | TEMPERATURE: 98.1 F | RESPIRATION RATE: 17 BRPM

## 2021-03-18 DIAGNOSIS — I10 ESSENTIAL HYPERTENSION: ICD-10-CM

## 2021-03-18 DIAGNOSIS — N39.0 URINARY TRACT INFECTION WITH HEMATURIA, SITE UNSPECIFIED: Primary | ICD-10-CM

## 2021-03-18 DIAGNOSIS — R31.9 URINARY TRACT INFECTION WITH HEMATURIA, SITE UNSPECIFIED: Primary | ICD-10-CM

## 2021-03-18 PROCEDURE — 81001 URINALYSIS AUTO W/SCOPE: CPT

## 2021-03-18 PROCEDURE — 87088 URINE BACTERIA CULTURE: CPT

## 2021-03-18 PROCEDURE — 87086 URINE CULTURE/COLONY COUNT: CPT

## 2021-03-18 PROCEDURE — 6370000000 HC RX 637 (ALT 250 FOR IP): Performed by: INTERNAL MEDICINE

## 2021-03-18 PROCEDURE — 87186 SC STD MICRODIL/AGAR DIL: CPT

## 2021-03-18 PROCEDURE — 99283 EMERGENCY DEPT VISIT LOW MDM: CPT

## 2021-03-18 RX ORDER — CIPROFLOXACIN 500 MG/1
500 TABLET, FILM COATED ORAL 2 TIMES DAILY
Qty: 14 TABLET | Refills: 0 | Status: SHIPPED | OUTPATIENT
Start: 2021-03-18 | End: 2021-03-25

## 2021-03-18 RX ORDER — CIPROFLOXACIN 500 MG/1
500 TABLET, FILM COATED ORAL ONCE
Status: COMPLETED | OUTPATIENT
Start: 2021-03-18 | End: 2021-03-18

## 2021-03-18 RX ORDER — IBUPROFEN 200 MG
600 TABLET ORAL ONCE
Status: COMPLETED | OUTPATIENT
Start: 2021-03-18 | End: 2021-03-18

## 2021-03-18 RX ADMIN — CIPROFLOXACIN HYDROCHLORIDE 500 MG: 500 TABLET, FILM COATED ORAL at 06:54

## 2021-03-18 RX ADMIN — IBUPROFEN 600 MG: 200 TABLET, FILM COATED ORAL at 06:40

## 2021-03-18 ASSESSMENT — PAIN DESCRIPTION - DESCRIPTORS: DESCRIPTORS: ACHING

## 2021-03-18 ASSESSMENT — PAIN DESCRIPTION - ORIENTATION: ORIENTATION: LOWER

## 2021-03-18 ASSESSMENT — PAIN DESCRIPTION - FREQUENCY: FREQUENCY: CONTINUOUS

## 2021-03-18 ASSESSMENT — PAIN DESCRIPTION - LOCATION: LOCATION: BACK

## 2021-03-18 NOTE — ED PROVIDER NOTES
[facility] ED  EMERGENCY DEPARTMENT ENCOUNTER      Pt Name: Mj Desai  MRN: [id number]  [place] 1965  Date of evaluation: 3/18/2021  Provider: Anastasia Rojas MD    CHIEF COMPLAINT       Chief Complaint   Patient presents with    Back Pain     pt states she has low back pain, with a history of uti. she states this is how her uti presents. she denies frequency or burning. HISTORY OF PRESENT ILLNESS   (Location/Symptom, Timing/Onset, Context/Setting, Quality, Duration, Modifying Factors, Severity)  Note limiting factors. Mj Desai is a 63 y.o. female who has a history of frequent UTIs, hyperlipidemia, Bell's palsy, presents to the emergency department for evaluation and management of low back pain suspicious for UTI. She states that she never has dysuria and it always starts the same way. Her symptoms started this morning. She states that she typically uses Cipro and it treat her infection. She has not tried anything for symptoms. She has not seen any other providers for this. This patient is being evaluated during the COVID-19 pandemic. HPI    Nursing Notes were reviewed. REVIEW OF SYSTEMS    (2-9 systems for level 4, 10 or more for level 5)       REVIEW OF SYSTEMS    Constitutional: Negative for fatigue and fever. Genitourinary: Negative for difficulty urinating, dysuria, hematuria and urgency. Musculoskeletal: Positive for low back pain negative for arthralgias,   and neck pain. Except as noted above the remainder of the review of systems was reviewed and negative.        PASTMEDICAL HISTORY     Past Medical History:   Diagnosis Date    Bell's palsy 2013    Carpal tunnel syndrome     Chicken pox     Hydronephrosis 2009    left     Hyperlipidemia 2013    Pneumonia     Renal atrophy     UTI (urinary tract infection)          SURGICAL HISTORY       Past Surgical History:   Procedure Laterality Date     SECTION      CYSTOSCOPY    HYSTERECTOMY  2010    ROTATOR CUFF REPAIR Left 11/2017         CURRENT MEDICATIONS       Discharge Medication List as of 3/18/2021  6:51 AM      CONTINUE these medications which have NOT CHANGED    Details   Phenazopyridine HCl (AZO-STANDARD PO) Take 2 tablets by mouthHistorical Med      metoprolol succinate (TOPROL XL) 25 MG extended release tablet Take 1 tablet by mouth daily, Disp-30 tablet,R-5Normal      fluticasone (FLONASE) 50 MCG/ACT nasal spray USE 2 SPRAYS IN EACH NOSTRIL EVERYDAY, Disp-1 Bottle, R-2Normal             ALLERGIES     Sulfa antibiotics    FAMILY HISTORY       Family History   Problem Relation Age of Onset    Cancer Mother         breast    Heart Disease Mother     Stroke Mother     Cancer Father           SOCIAL HISTORY       Social History     Socioeconomic History    Marital status:      Spouse name: None    Number of children: None    Years of education: None    Highest education level: None   Occupational History    None   Social Needs    Financial resource strain: Not hard at all   [employer] insecurity     Worry: Never true     Inability: Never true   [employer] needs     Medical: None     Non-medical: None   Tobacco Use    Smoking status: Never Smoker    Smokeless tobacco: Never Used   Substance and Sexual Activity    Alcohol use: No     Alcohol/week: 0.0 standard drinks    Drug use: No    Sexual activity: None   Lifestyle    Physical activity     Days per week: None     Minutes per session: None    Stress: None   Relationships    Social connections     Talks on phone: None     Gets together: None     Attends Mosque service: None     Active member of club or organization: None     Attends meetings of clubs or organizations: None     Relationship status: None    Intimate partner violence     Fear of current or ex partner: None     Emotionally abused: None     Physically abused: None     Forced sexual activity: None   Other Topics Concern    None Social History Narrative    None       SCREENINGS    [place] Coma Scale  Eye Opening: Spontaneous  Best Verbal Response: Oriented  Best Motor Response: Obeys commands  [place] Coma Scale Score: 15        PHYSICAL EXAM    (up to 7 for level 4, 8 or more for level 5)     ED Triage Vitals [03/18/21 0621]   BP Temp Temp Source Pulse Resp SpO2 Height Weight   (!) 147/65 98.1 °F (36.7 °C) Oral 77 17 98 % -- (!) 307 lb (139.3 kg)       Physical Exam  Physical Exam   Constitutional:  Appears well, well-developed and well-nourished. No distress noted. Non toxic in appearance. HENT:     Head: Normocephalic and atraumatic. Mouth/Throat: Oropharynx is clear and mucosa moist. No oropharyngeal exudate noted. Posterior pharynx is pink and noninjected. Eyes: Conjunctivae and EOM are normal. Pupils are equal, round, and reactive to light. No scleral icterus. There is no tearing or drainage. Cardiovascular: Normal rate, regular rhythm, normal heartsounds and intact distal pulses. Exam reveals no gallop or friction rub. No murmur heard. Pulmonary/Chest: Effort normal and breath sounds are symmetric and normal. No respiratory distress. There are no wheezes, rales or rhonchi. Abdominal: Soft. Bowel sounds are normal. No distension or no mass exhibitted. There is no tenderness, rebound, rigidity or guarding. Genitourinary:   Mild to moderate bilateral CVA tenderness noted on examination by palpation and percussion. Musculoskeletal: Normal range of motion. No edema, tenderness or deformity. Lymphadenopathy:  No cervical adenopathy. Neurological:   alert and oriented to person, place, and time. Normal speech, normal comprehension, normal cognition,  Reflexes are normal.  There are no cranial nerve deficits. Normal muscle tone, motor and sensory function including SILT exhibited. Coordination normal and gait normal. Strength 5/5 in all extremities and torso. Skin: Skin is warm and dry. No rash noted.  No diaphoresis. No erythema. No pallor. Psychiatric: Pleasant and cooperative. Normal mood and affect. Behavior is  normal. Judgment and thought content normal.     DIAGNOSTIC RESULTS     EKG: All EKG's are interpreted by the Emergency Department Physician who either signs or Co-signs this chart in the absence of a cardiologist.    Not indicated. RADIOLOGY:   Non-plain film images such as CT, Ultrasoundand MRI are read by the radiologist. Plain radiographic images are visualized and preliminarily interpreted by the emergency physician with the below findings:    Not indicated. Interpretation per the Radiologist below, if available at the time of this note:    No orders to display         ED BEDSIDE ULTRASOUND:   Performed by ED Physician - none    LABS:  Labs Reviewed   URINALYSIS - Abnormal; Notable for the following components:       Result Value    Turbidity UA CLOUDY (*)     Urine Hgb 2+ (*)     Protein, UA 1+ (*)     Nitrite, Urine POSITIVE (*)     Leukocyte Esterase, Urine 3+ (*)     All other components within normal limits   MICROSCOPIC URINALYSIS - Abnormal; Notable for the following components:    Bacteria, UA 4+ (*)     All other components within normal limits   CULTURE, URINE       All other labs were within normal range or not returned as of this dictation. EMERGENCY DEPARTMENT COURSE and DIFFERENTIAL DIAGNOSIS/MDM:   Vitals:    Vitals:    03/18/21 0621   BP: (!) 147/65   Pulse: 77   Resp: 17   Temp: 98.1 °F (36.7 °C)   TempSrc: Oral   SpO2: 98%   Weight: (!) 307 lb (139.3 kg)       Noted    MDM    CRITICAL CARE TIME   Total Critical Care time was 0 minutes,       EDCOURSE       CONSULTS:  None    PROCEDURES:  Unless otherwise noted below, none     Procedures      Hay Yu is a 63 y.o. female who has a history of frequent UTIs with duplicated ureters, presented with low back pain and found to have another UTI. She was prescribed ciprofloxacin.   She is otherwise well, well hydrated, nontoxic, hemodynamically stable, neurologically intact, and satisfactory for discharge for outpatient management. Findings discussed at length with patient. I instructed her to drink plenty of fluids, take ibuprofen or Tylenol for pain control and finish her antibiotics. I instructed the patient to followup with the PCP as needed if no improvement and for management of hypertension. I instructed the patient to return to the ER if her condition worsens, if there is any concern for altered mental status, difficulty breathing, dehydration or loss of function. Patient Course:        ED Medicationsadministered this visit:    Medications   ibuprofen (ADVIL;MOTRIN) tablet 600 mg (600 mg Oral Given 3/18/21 0640)   ciprofloxacin (CIPRO) tablet 500 mg (500 mg Oral Given 3/18/21 0654)       New Prescriptions from this visit:    Discharge Medication List as of 3/18/2021  6:51 AM      START taking these medications    Details   ciprofloxacin (CIPRO) 500 MG tablet Take 1 tablet by mouth 2 times daily for 7 days, Disp-14 tablet, R-0Normal             Follow-up:  [facility] ED  [address] [facility] [id number] [phone]  Go to   As needed, If symptoms worsen    Danya Muñiz MD  [address] [address]  [phone]    Schedule an appointment as soon as possible for a visit in 1 week  As needed, If symptoms worsen        Final Impression:   1. Urinary tract infection with hematuria, site unspecified    2. Essential hypertension               (Please note that portions of this note werecompleted with a voice recognition program.  Efforts were made to edit the dictations but occasionally words are mis-transcribed.)    FINAL IMPRESSION      1. Urinary tract infection with hematuria, site unspecified    2.  Essential hypertension          DISPOSITION/PLAN   DISPOSITION        PATIENT REFERRED TO:  [facility] ED  [address] [facility] [address] [phone]  Go to   As needed, If symptoms worsen    Emma De La Fuente MD  [address]  [phone]    Schedule an appointment as soon as possible for a visit in 1 week  As needed, If symptoms worsen      DISCHARGE MEDICATIONS:  Discharge Medication List as of 3/18/2021  6:51 AM      START taking these medications    Details   ciprofloxacin (CIPRO) 500 MG tablet Take 1 tablet by mouth 2 times daily for 7 days, Disp-14 tablet, R-0Normal                (Please note that portions of this note were completed with a voice recognition program.  Efforts were made to edit the dictations but occasionally words are mis-transcribed.)    Sheryl Alarcon MD (electronically signed)  Attending Emergency Physician            Sheryl Alarcon MD  03/18/21 4131

## 2021-03-19 LAB
CULTURE: ABNORMAL
Lab: ABNORMAL
SPECIMEN DESCRIPTION: ABNORMAL

## 2021-04-05 DIAGNOSIS — Z12.11 SCREENING FOR COLON CANCER: Primary | ICD-10-CM

## 2021-04-13 RX ORDER — METOPROLOL SUCCINATE 25 MG/1
TABLET, EXTENDED RELEASE ORAL
Qty: 30 TABLET | Refills: 5 | Status: SHIPPED | OUTPATIENT
Start: 2021-04-13 | End: 2021-10-14

## 2021-04-14 RX ORDER — FLUTICASONE PROPIONATE 50 MCG
SPRAY, SUSPENSION (ML) NASAL
Qty: 1 BOTTLE | Refills: 2 | Status: SHIPPED | OUTPATIENT
Start: 2021-04-14 | End: 2021-07-12

## 2021-07-12 RX ORDER — FLUTICASONE PROPIONATE 50 MCG
SPRAY, SUSPENSION (ML) NASAL
Qty: 1 BOTTLE | Refills: 0 | Status: SHIPPED | OUTPATIENT
Start: 2021-07-12 | End: 2021-08-04

## 2021-07-29 ENCOUNTER — OFFICE VISIT (OUTPATIENT)
Dept: FAMILY MEDICINE CLINIC | Age: 56
End: 2021-07-29
Payer: COMMERCIAL

## 2021-07-29 ENCOUNTER — HOSPITAL ENCOUNTER (OUTPATIENT)
Age: 56
Discharge: HOME | End: 2021-07-29
Payer: COMMERCIAL

## 2021-07-29 VITALS
BODY MASS INDEX: 41.95 KG/M2 | HEIGHT: 70 IN | WEIGHT: 293 LBS | SYSTOLIC BLOOD PRESSURE: 118 MMHG | OXYGEN SATURATION: 99 % | HEART RATE: 67 BPM | DIASTOLIC BLOOD PRESSURE: 78 MMHG

## 2021-07-29 DIAGNOSIS — Z00.00 ROUTINE HEALTH MAINTENANCE: Primary | ICD-10-CM

## 2021-07-29 DIAGNOSIS — R73.01 IFG (IMPAIRED FASTING GLUCOSE): ICD-10-CM

## 2021-07-29 DIAGNOSIS — Z13.6 SCREENING FOR CARDIOVASCULAR CONDITION: ICD-10-CM

## 2021-07-29 DIAGNOSIS — Z90.710 ABSENCE OF BOTH CERVIX AND UTERUS, ACQUIRED: ICD-10-CM

## 2021-07-29 DIAGNOSIS — Z11.59 ENCOUNTER FOR HEPATITIS C SCREENING TEST FOR LOW RISK PATIENT: ICD-10-CM

## 2021-07-29 DIAGNOSIS — G47.33 OSA (OBSTRUCTIVE SLEEP APNEA): ICD-10-CM

## 2021-07-29 PROBLEM — N39.0 FREQUENT UTI: Status: RESOLVED | Noted: 2018-08-06 | Resolved: 2021-07-29

## 2021-07-29 LAB
ALBUMIN SERPL-MCNC: 4.2 G/DL (ref 3.5–5.2)
ALBUMIN/GLOBULIN RATIO: ABNORMAL (ref 1–2.5)
ALP BLD-CCNC: 76 U/L (ref 35–104)
ALT SERPL-CCNC: 12 U/L (ref 5–33)
ANION GAP SERPL CALCULATED.3IONS-SCNC: 13 MMOL/L (ref 9–17)
AST SERPL-CCNC: 17 U/L
BILIRUB SERPL-MCNC: 0.58 MG/DL (ref 0.3–1.2)
BUN BLDV-MCNC: 11 MG/DL (ref 6–20)
BUN/CREAT BLD: 19 (ref 9–20)
CALCIUM SERPL-MCNC: 9.4 MG/DL (ref 8.6–10.4)
CHLORIDE BLD-SCNC: 108 MMOL/L (ref 98–107)
CHOLESTEROL/HDL RATIO: 6.2
CHOLESTEROL: 224 MG/DL
CO2: 22 MMOL/L (ref 20–31)
CREAT SERPL-MCNC: 0.58 MG/DL (ref 0.5–0.9)
ESTIMATED AVERAGE GLUCOSE: 126 MG/DL
GFR AFRICAN AMERICAN: >60 ML/MIN
GFR NON-AFRICAN AMERICAN: >60 ML/MIN
GFR SERPL CREATININE-BSD FRML MDRD: ABNORMAL ML/MIN/{1.73_M2}
GFR SERPL CREATININE-BSD FRML MDRD: ABNORMAL ML/MIN/{1.73_M2}
GLUCOSE BLD-MCNC: 108 MG/DL (ref 70–99)
HBA1C MFR BLD: 6 % (ref 4–6)
HDLC SERPL-MCNC: 36 MG/DL
HEPATITIS C ANTIBODY: NONREACTIVE
LDL CHOLESTEROL: 152 MG/DL (ref 0–130)
PATIENT FASTING?: YES
POTASSIUM SERPL-SCNC: 4.3 MMOL/L (ref 3.7–5.3)
SODIUM BLD-SCNC: 143 MMOL/L (ref 135–144)
TOTAL PROTEIN: 6.9 G/DL (ref 6.4–8.3)
TRIGL SERPL-MCNC: 182 MG/DL
VLDLC SERPL CALC-MCNC: ABNORMAL MG/DL (ref 1–30)

## 2021-07-29 PROCEDURE — 80053 COMPREHEN METABOLIC PANEL: CPT

## 2021-07-29 PROCEDURE — 36415 COLL VENOUS BLD VENIPUNCTURE: CPT

## 2021-07-29 PROCEDURE — 83036 HEMOGLOBIN GLYCOSYLATED A1C: CPT

## 2021-07-29 PROCEDURE — 80061 LIPID PANEL: CPT

## 2021-07-29 PROCEDURE — 99396 PREV VISIT EST AGE 40-64: CPT | Performed by: FAMILY MEDICINE

## 2021-07-29 PROCEDURE — 86803 HEPATITIS C AB TEST: CPT

## 2021-07-29 SDOH — ECONOMIC STABILITY: FOOD INSECURITY: WITHIN THE PAST 12 MONTHS, THE FOOD YOU BOUGHT JUST DIDN'T LAST AND YOU DIDN'T HAVE MONEY TO GET MORE.: NEVER TRUE

## 2021-07-29 SDOH — ECONOMIC STABILITY: FOOD INSECURITY: WITHIN THE PAST 12 MONTHS, YOU WORRIED THAT YOUR FOOD WOULD RUN OUT BEFORE YOU GOT MONEY TO BUY MORE.: NEVER TRUE

## 2021-07-29 ASSESSMENT — ENCOUNTER SYMPTOMS
GASTROINTESTINAL NEGATIVE: 1
RESPIRATORY NEGATIVE: 1
EYES NEGATIVE: 1

## 2021-07-29 ASSESSMENT — PATIENT HEALTH QUESTIONNAIRE - PHQ9
SUM OF ALL RESPONSES TO PHQ9 QUESTIONS 1 & 2: 0
SUM OF ALL RESPONSES TO PHQ QUESTIONS 1-9: 0
1. LITTLE INTEREST OR PLEASURE IN DOING THINGS: 0
2. FEELING DOWN, DEPRESSED OR HOPELESS: 0

## 2021-07-29 ASSESSMENT — SOCIAL DETERMINANTS OF HEALTH (SDOH): HOW HARD IS IT FOR YOU TO PAY FOR THE VERY BASICS LIKE FOOD, HOUSING, MEDICAL CARE, AND HEATING?: NOT HARD AT ALL

## 2021-07-29 NOTE — PATIENT INSTRUCTIONS
SURVEY:    You may be receiving a survey from [employer] regarding your visit today. You may get this in the mail, through your MyChart or in your email. Please complete the survey to enable us to provide the highest quality of care to you and your family. If you cannot score us as very good ( 5 Stars) on any question, please feel free to call the office to discuss how we could have made your experience exceptional.     Thank you.     Clinical Care Team:  DO Nathanael Zapata [facility], [address] [place] [address] Team:  Amandeep 11

## 2021-07-29 NOTE — PROGRESS NOTES
Name: Jaiden Barnett  : 1965         Chief Complaint:     Chief Complaint   Patient presents with    Annual Exam       History of Present Illness:      Jaiden Barnett is a 61 y.o.  female who presents with Annual Exam      \[facility] [place] [facility]\""     Health maintenance, no concerns. Status post hysterectomy/BSO for endometriosis and had never had an abnormal Pap test prior to that. ITZ on CPAP for 3-4 mos. Trouble with it pushing up against her eyes, asaf R one which had had bells palsy. Past Medical History:     Past Medical History:   Diagnosis Date    Bell's palsy 2013    Carpal tunnel syndrome     Chicken pox     Hydronephrosis 2009    left     Hyperlipidemia 2013    Pneumonia     Renal atrophy     UTI (urinary tract infection)         Past Surgical History:     Past Surgical History:   Procedure Laterality Date     SECTION      CYSTOSCOPY  2009    HYSTERECTOMY, TOTAL ABDOMINAL  2010    ROTATOR CUFF REPAIR Left 2017        Medications:       Prior to Admission medications    Medication Sig Start Date End Date Taking? Authorizing Provider   fluticasone (FLONASE) 50 MCG/ACT nasal spray USE 2 SPRAYS IN EACH NOSTRIL EVERYDAY 21  Yes Du Mejia MD   metoprolol succinate (TOPROL XL) 25 MG extended release tablet TAKE 1 TABLET BY MOUTH EVERY DAY 21  Yes Du Mejia MD   Phenazopyridine HCl (AZO-STANDARD PO) Take 2 tablets by mouth   Yes Historical Provider, MD        Allergies:       Sulfa antibiotics    Social History:     Tobacco:    reports that she has never smoked. She has never used smokeless tobacco.  Alcohol:      reports no history of alcohol use. Drug Use:  reports no history of drug use.     Family History:     Family History   Problem Relation Age of Onset    Cancer Mother         breast    Heart Disease Mother     Stroke Mother     Cancer Father        Review of Systems:     Positive and Negative as described in HPI    Review of Systems   Constitutional: Negative. HENT: Negative. Eyes: Negative. Respiratory: Negative. Cardiovascular: Negative. Gastrointestinal: Negative. Genitourinary: Negative. Musculoskeletal: Negative. Skin: Negative. Crusted lesion on left cheek, raised spot on right side of scalp   Neurological: Negative. Hematological: Negative. Psychiatric/Behavioral: Negative. Physical Exam:     Vitals:  /78   Pulse 67   Ht 5' 10\" (1.778 m)   Wt (!) 311 lb (141.1 kg)   SpO2 99%   BMI 44.62 kg/m²   Physical Exam  Vitals and nursing note reviewed. Constitutional:       Appearance: Normal appearance. She is well-developed. She is not ill-appearing. HENT:      Right Ear: Hearing and tympanic membrane normal.      Left Ear: Hearing and tympanic membrane normal.      Nose: Nose normal. No mucosal edema. Mouth/Throat:      Mouth: Mucous membranes are moist.      Pharynx: Oropharynx is clear. Eyes:      Pupils: Pupils are equal, round, and reactive to light. Comments: Injection of bilateral bulbar conjunctiva, no exudate   Neck:      Thyroid: No thyroid mass or thyromegaly. Cardiovascular:      Rate and Rhythm: Normal rate and regular rhythm. Heart sounds: S1 normal and S2 normal. No murmur heard. Pulmonary:      Effort: Pulmonary effort is normal.      Breath sounds: Normal breath sounds. Abdominal:      General: Bowel sounds are normal.      Palpations: Abdomen is soft. Tenderness: There is no abdominal tenderness. Lymphadenopathy:      Cervical: No cervical adenopathy. Skin:     General: Skin is warm and dry. Findings: No rash. Comments: Right posterior scalp: Compound amelanotic nevus present, approximately 1.5 cm diameter  Left cheek: Raised round lesion approximately 8 mm diameter, pink with white crusting overlying   Neurological:      Mental Status: She is alert and oriented to person, place, and time.    Psychiatric:         Mood and Affect: Mood normal. Behavior: Behavior normal.         Data:     Lab Results   Component Value Date     07/29/2021    K 4.3 07/29/2021     07/29/2021    CO2 22 07/29/2021    BUN 11 07/29/2021    CREATININE 0.58 07/29/2021    GLUCOSE 108 07/29/2021    PROT 6.9 07/29/2021    LABALBU 4.2 07/29/2021    BILITOT 0.58 07/29/2021    ALKPHOS 76 07/29/2021    AST 17 07/29/2021    ALT 12 07/29/2021     Lab Results   Component Value Date    WBC 7.0 09/05/2020    RBC 4.99 09/05/2020    HGB 14.3 09/05/2020    HCT 42.8 09/05/2020    MCV 85.8 09/05/2020    MCH 28.8 09/05/2020    MCHC 33.5 09/05/2020    RDW 13.7 09/05/2020     09/05/2020    MPV NOT REPORTED 09/05/2020     Lab Results   Component Value Date    TSH 1.07 09/05/2020     Lab Results   Component Value Date    CHOL 215 08/07/2020    CHOL 169 02/03/2014    HDL 34 08/07/2020    LABA1C 5.7 09/12/2018         Assessment & Plan:        Diagnosis Orders   1. Routine health maintenance     2. IFG (impaired fasting glucose)  Hemoglobin A1C   3. Screening for cardiovascular condition  Lipid Panel    Comprehensive Metabolic Panel   4. Encounter for hepatitis C screening test for low risk patient  Hepatitis C Antibody   5. ITZ (obstructive sleep apnea)     6. Absence of both cervix and uterus, acquired     History and exam overall within normal limits. Updating health maintenance with labs and also looking into colon cancer screening, patient needs to have it done within a certain frame of time so we will investigate which providers may have that availability. Does not need to have Pap tests as her uterus and cervix have been removed and she had not had any abnormal Paps prior to that. ITZ improved on CPAP but having some irritation of the eyes from her mask. Advised to contact DME company regarding different mask type or fit.         Requested Prescriptions      No prescriptions requested or ordered in this encounter       Patient Instructions   SURVEY:    You may be receiving a survey from [employer] regarding your visit today. You may get this in the mail, through your MyChart or in your email. Please complete the survey to enable us to provide the highest quality of care to you and your family. If you cannot score us as very good ( 5 Stars) on any question, please feel free to call the office to discuss how we could have made your experience exceptional.     Thank you. Clinical Care Team:  [facility], [address] [place] [address] Team:  Andriy Lomax received counseling on the following healthy behaviors: medication adherence  Reviewed prior labs and health maintenance. Continue current medications, diet and exercise. Discussed use, benefit, and side effects of prescribed medications. Barriers to medication compliance addressed. Patient given educational materials - see patient instructions. All patient questions answered. Patient voiced understanding.      Electronically signed by Ferdinand Nash DO on 7/29/2021 at 1:31 PM   [address]  Dept: [phone]

## 2021-07-30 DIAGNOSIS — Z12.12 ENCOUNTER FOR COLORECTAL CANCER SCREENING: Primary | ICD-10-CM

## 2021-07-30 DIAGNOSIS — Z12.11 ENCOUNTER FOR COLORECTAL CANCER SCREENING: Primary | ICD-10-CM

## 2021-08-04 RX ORDER — FLUTICASONE PROPIONATE 50 MCG
SPRAY, SUSPENSION (ML) NASAL
Qty: 1 BOTTLE | Refills: 5 | Status: SHIPPED | OUTPATIENT
Start: 2021-08-04 | End: 2022-02-02

## 2021-08-05 ENCOUNTER — OFFICE VISIT (OUTPATIENT)
Dept: SURGERY | Age: 56
End: 2021-08-05
Payer: COMMERCIAL

## 2021-08-05 ENCOUNTER — TELEPHONE (OUTPATIENT)
Dept: FAMILY MEDICINE CLINIC | Age: 56
End: 2021-08-05

## 2021-08-05 VITALS
OXYGEN SATURATION: 96 % | WEIGHT: 293 LBS | DIASTOLIC BLOOD PRESSURE: 84 MMHG | BODY MASS INDEX: 44.81 KG/M2 | SYSTOLIC BLOOD PRESSURE: 137 MMHG

## 2021-08-05 DIAGNOSIS — Z80.3 FAMILY HISTORY OF BREAST CANCER IN MOTHER: ICD-10-CM

## 2021-08-05 DIAGNOSIS — G47.33 OSA (OBSTRUCTIVE SLEEP APNEA): ICD-10-CM

## 2021-08-05 DIAGNOSIS — Z01.818 PRE-OP TESTING: Primary | ICD-10-CM

## 2021-08-05 DIAGNOSIS — Z12.11 ENCOUNTER FOR SCREENING COLONOSCOPY: ICD-10-CM

## 2021-08-05 PROCEDURE — 99202 OFFICE O/P NEW SF 15 MIN: CPT | Performed by: SURGERY

## 2021-08-05 NOTE — TELEPHONE ENCOUNTER
Patient asking for a letter for her employer stating that she no longer needs to have PAPs done due to her having a hysterectomy - need faxed to her employer attn:[employer] @ [phone]

## 2021-08-05 NOTE — LETTER
[facility] RAFI Sims [address] [place] [id number]  Phone: [phone]  Fax: [address]tw[address], DO        August 6, 2021     Patient: Crescencio Ortega   YOB: 1965   Date of Visit: 8/5/2021       To Whom It May Concern: It is my medical opinion that there is no medical indication for Vivek Forge to undergo PAP testing as she is s/p hysterectomy for benign reasons. If you have any questions or concerns, please don't hesitate to call.     Sincerely,        Masha Villagran DO

## 2021-08-05 NOTE — LETTER
[facility]IMARY [facility] RAFI [place] [address]  Phone: [phone]  Fax: [address], [place]        August 6, 2021     Patient: Marya Nolasco   YOB: 1965   Date of Visit: 8/5/2021       To Whom It May Concern: It is my medical opinion that there is no medical indication for Theo Montanez to undergo PAP testing as she is s/p hysterectomy for benign reasons. If you have any questions or concerns, please don't hesitate to call.     Sincerely,        Jessica Hankins DO

## 2021-08-08 ASSESSMENT — ENCOUNTER SYMPTOMS
BLOOD IN STOOL: 0
TROUBLE SWALLOWING: 0
VOMITING: 0
APNEA: 1
COUGH: 0
SHORTNESS OF BREATH: 0
SORE THROAT: 0
ABDOMINAL PAIN: 0
BACK PAIN: 1
NAUSEA: 0
CHOKING: 0

## 2021-08-09 NOTE — PROGRESS NOTES
Edwin Galarza MD  General Surgery, Endoscopy  Chief Medical Officer    [facility] Rossy Figueroa  [address] [address]  Dept: [phone]  Fax: [address]  Chief Complaint   Patient presents with    Colonoscopy       HPI    Ms Tatum Brower is a pleasant 48-year-old white female kindly referred to me by [facility] for screening colonoscopy. She has no GI complaints. No abdominal pain. No epigastric discomfort nor reflux symptoms. Daily bowel movements, formed and brown, without blood. No previous GI surgery nor endoscopy. No cough, fever nor other respiratory symptoms. No history of COVID-19, vaccination schedule is complete. No recent weight changes, 312 pounds, BMI 45. Obstructive sleep apnea. Mother treated for breast cancer,  in her [de-identified] with heart disease. Father  in his [de-identified] with leukemia. 2 children, son and daughter, 4 grandchildren, all healthy. No family history of GI malignancy nor polyps to her knowledge. She has never smoked. Review of Systems   Constitutional: Negative for activity change, appetite change, chills, fever and unexpected weight change. HENT: Negative for nosebleeds, sneezing, sore throat and trouble swallowing. Eyes: Negative for visual disturbance. Respiratory: Positive for apnea. Negative for cough, choking and shortness of breath. Cardiovascular: Negative for chest pain, palpitations and leg swelling. Gastrointestinal: Negative for abdominal pain, blood in stool, nausea and vomiting. Genitourinary: Negative for dysuria, flank pain and hematuria. Musculoskeletal: Positive for arthralgias and back pain. Negative for gait problem and myalgias. Allergic/Immunologic: Negative for immunocompromised state. Neurological: Negative for dizziness, seizures, syncope, weakness and headaches. Hematological: Does not bruise/bleed easily.    Psychiatric/Behavioral: Negative for confusion and sleep disturbance. Past Medical History:   Diagnosis Date    Bell's palsy 2013    Carpal tunnel syndrome     Chicken pox     Hydronephrosis 2009    left     Hyperlipidemia 2013    Pneumonia     Renal atrophy     UTI (urinary tract infection)        Past Surgical History:   Procedure Laterality Date     SECTION      CYSTOSCOPY  2009    HYSTERECTOMY, TOTAL ABDOMINAL  2010    ROTATOR CUFF REPAIR Left 2017       Family History   Problem Relation Age of Onset    Cancer Mother         breast    Heart Disease Mother     Stroke Mother     Cancer Father        Allergies:  See list    Current Outpatient Medications   Medication Sig Dispense Refill    fluticasone (FLONASE) 50 MCG/ACT nasal spray USE 2 SPRAYS IN EACH NOSTRIL EVERYDAY 1 Bottle 5    metoprolol succinate (TOPROL XL) 25 MG extended release tablet TAKE 1 TABLET BY MOUTH EVERY DAY 30 tablet 5    Phenazopyridine HCl (AZO-STANDARD PO) Take 2 tablets by mouth       No current facility-administered medications for this visit. Social History     Socioeconomic History    Marital status:      Spouse name: None    Number of children: None    Years of education: None    Highest education level: None   Occupational History    None   Tobacco Use    Smoking status: Never Smoker    Smokeless tobacco: Never Used   Vaping Use    Vaping Use: Never used   Substance and Sexual Activity    Alcohol use: No     Alcohol/week: 0.0 standard drinks    Drug use: No    Sexual activity: None   Other Topics Concern    None   Social History Narrative    None     Social Determinants of Health     Financial Resource Strain: Low Risk     Difficulty of Paying Living Expenses: Not hard at all   Food Insecurity: No Food Insecurity    Worried About Running Out of Food in the Last Year: Never true    [place] of Food in the Last Year: Never true   Transportation Needs:     Lack of Transportation (Medical):      Lack of Transportation (Non-Medical):    Physical Activity:     Days of Exercise per Week:     Minutes of Exercise per Session:    Stress:     Feeling of Stress :    Social Connections:     Frequency of Communication with Friends and Family:     Frequency of Social Gatherings with Friends and Family:     Attends Spiritism Services:     Active Member of Clubs or Organizations:     Attends Club or Organization Meetings:     Marital Status:    Intimate Partner Violence:     Fear of Current or Ex-Partner:     Emotionally Abused:     Physically Abused:     Sexually Abused:        /84 (Site: Right Upper Arm, Position: Sitting, Cuff Size: Medium Adult)   Wt (!) 312 lb 4.8 oz (141.7 kg)   SpO2 96%   BMI 44.81 kg/m²      Physical Exam  Vitals and nursing note reviewed. Constitutional:       Appearance: She is well-developed. HENT:      Head: Normocephalic and atraumatic. Eyes:      General: No scleral icterus. Conjunctiva/sclera: Conjunctivae normal.      Pupils: Pupils are equal, round, and reactive to light. Neck:      Vascular: No JVD. Trachea: No tracheal deviation. Cardiovascular:      Rate and Rhythm: Normal rate and regular rhythm. Pulmonary:      Effort: Pulmonary effort is normal. No respiratory distress. Chest:      Chest wall: No tenderness. Abdominal:      General: There is no distension. Palpations: Abdomen is soft. There is no mass. Tenderness: There is no abdominal tenderness. There is no guarding or rebound. Musculoskeletal:         General: Normal range of motion. Cervical back: Normal range of motion and neck supple. Lymphadenopathy:      Cervical: No cervical adenopathy. Skin:     General: Skin is warm and dry. Findings: No erythema or rash. Neurological:      Mental Status: She is alert and oriented to person, place, and time. Cranial Nerves: No cranial nerve deficit. Psychiatric:         Behavior: Behavior normal.         Thought Content:  Thought content normal.         Judgment: Judgment normal.         IMAGING/LABS    CT ABDOMEN PELVIS WO CONTRAST Additional Contrast? None  Status: Final result   Order Providers    Authorizing Billing   Whitney Roger, DO Whitney Roger DO          Signed by    Signed Date/Time Phone Pager   Jesus Doty 7/03/2018  9:29 [phone]    Reading Providers    Read Date Phone Pager   [address] Jul 3, 2018  9:29 [phone]    Radiation Dose Estimates    No radiation information found for this patient   Narrative   CT ABDOMEN AND PELVIS WITHOUT CONTRAST        HISTORY: 21-year-old female complaining of new onset of left flank pain today       COMPARISON: None           TECHNIQUE: Unenhanced helical computerized tomography was performed and    multiplanar reformatted images were provided.      Dose reduction techniques    were achieved by using: automated exposure control and/or adjustment of mA    and/or kV according to patient size and/or use of iterative reconstruction    technique.            FINDINGS: There is a duplicated renal collecting system on the left with    obstruction of the upper pole moiety resulting in superior pole localized    hydronephrosis as well as hydroureter of the ureter draining the superior pole. This extends to the level of the bladder where there is a prominent cystic    structure seen in the midline and slightly to the left of midline measuring 3.6    cm in maximal diameter. 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 Alternatively, the cystic lesion could represent a large nabothian cyst, or    less likely, a cystic or necrotic cervical malignancy. A postcontrast CT    urogram could be obtained for further evaluation.       No urinary tract calculus is evident. The right kidney is unremarkable.       Minimal old calcified granulomatous change is seen within the spleen. No focal    hepatic abnormality is noted. The adrenal glands are not enlarged.  The aorta and IVC have a normal unenhanced appearance. There are no findings of bowel    obstruction or free air. No free fluid is evident. The uterus is surgically    absent. The perirectal soft tissue-fat planes are intact.               Impression           1. Duplicated left renal collecting system with obstruction of the upper pole    moiety and diffuse dilatation of the ureter draining the upper pole to the    level of the urinary bladder where a 3.6 cm cystic lesion is identified in the    midline and extending slightly to the left of midline. This could represent an    ectopic ureterocele. Alternatively, the cystic lesion could represent a large    nabothian cyst or cystic/necrotic cervical malignancy. A CT urogram may yield    additional diagnostic information.       2. No urinary tract calculus       3. Status post hysterectomy             ASSESSMENT     Diagnosis Orders   1. Pre-op testing  EKG 12 Lead   2. Encounter for screening colonoscopy     3. ITZ (obstructive sleep apnea)     4. BMI 40.0-44.9, adult ([address]     5. Family history of breast cancer in mother         PLAN    We will proceed with screening colonoscopy. Risks, benefits, alternatives thoroughly reviewed and accepted by Ms. Alia Gonzalez, including GI bleeding, perforation, missed lesions, COVID-19 exposure/infection, etc.     Jennifer Barba MD

## 2021-08-09 NOTE — PATIENT INSTRUCTIONS
Patient Education        Learning About Colonoscopy  What is a colonoscopy? A colonoscopy is a test (also called a procedure) that lets a doctor look inside your large intestine. The doctor uses a thin, lighted tube called a colonoscope. The doctor uses it to look for small growths called polyps, colon or rectal cancer (colorectal cancer), or other problems like bleeding. During the procedure, the doctor can take samples of tissue. The samples can then be checked for cancer or other conditions. The doctor can also take out polyps. How is a colonoscopy done? This procedure is done in a doctor's office or a clinic or hospital. You will get medicine to help you relax and not feel pain. Some people find that they don't remember having the test because of the medicine. The doctor gently moves the colonoscope, or scope, through the colon. The scope is also a small video camera. It lets the doctor see the colon and take pictures. How do you prepare for the procedure? You need to clean out your colon before the procedure so the doctor can see your colon. This depends on which \"colon prep\" your doctor recommends. To clean out your colon, you'll do a \"colon prep\" before the test. This means you stop eating solid foods and drink only clear liquids. You can have water, tea, coffee, clear juices, clear broths, flavored ice pops, and gelatin (such as Jell-O). Do not drink anything red or purple. The day or night before the procedure, you drink a large amount of a special liquid. This causes loose, frequent stools. You will go to the bathroom a lot. Your doctor may have you drink part of the liquid the evening before and the rest on the day of the test. It's very important to drink all of the liquid. If you have problems drinking it, call your doctor. Arrange to have someone take you home after the test.  What can you expect after a colonoscopy?   Your doctor will tell you when you can eat and do your usual activities. Drink a lot of fluid after the test to replace the fluids you may have lost during the colon prep. But don't drink alcohol. Your doctor will talk to you about when you'll need your next colonoscopy. The results of your test and your risk for colorectal cancer will help your doctor decide how often you need to be checked. After the test, you may be bloated or have gas pains. You may need to pass gas. If a biopsy was done or a polyp was removed, you may have streaks of blood in your stool (feces) for a few days. Check with your doctor to see when it is safe to take aspirin and nonsteroidal anti-inflammatory drugs (NSAIDs) again. Problems such as heavy rectal bleeding may not occur until several weeks after the test. This isn't common. But it can happen after polyps are removed. Follow-up care is a key part of your treatment and safety. Be sure to make and go to all appointments, and call your doctor if you are having problems. It's also a good idea to know your test results and keep a list of the medicines you take. Where can you learn more? Go to https://[email or web address].[email or web address]. org and sign in to your [employer] account. Enter Z628 in the [facility] box to learn more about \"Learning About Colonoscopy. \"     If you do not have an account, please click on the \"Sign Up Now\" link. Current as of: December 17, 2020               Content Version: 12.9  © 4019-5280 Healthwise, Incorporated. Care instructions adapted under license by [facility] ([facility]). If you have questions about a medical condition or this instruction, always ask your healthcare professional. [address] any warranty or liability for your use of this information.

## 2021-08-11 ENCOUNTER — HOSPITAL ENCOUNTER (OUTPATIENT)
Age: 56
Discharge: HOME | End: 2021-08-11
Payer: COMMERCIAL

## 2021-08-11 DIAGNOSIS — Z01.818 PRE-OP TESTING: ICD-10-CM

## 2021-08-11 PROCEDURE — 93005 ELECTROCARDIOGRAM TRACING: CPT

## 2021-08-12 LAB
EKG ATRIAL RATE: 73 BPM
EKG P AXIS: 64 DEGREES
EKG P-R INTERVAL: 156 MS
EKG Q-T INTERVAL: 400 MS
EKG QRS DURATION: 74 MS
EKG QTC CALCULATION (BAZETT): 440 MS
EKG R AXIS: 10 DEGREES
EKG T AXIS: 39 DEGREES
EKG VENTRICULAR RATE: 73 BPM

## 2021-08-12 PROCEDURE — 93010 ELECTROCARDIOGRAM REPORT: CPT | Performed by: INTERNAL MEDICINE

## 2021-09-13 ENCOUNTER — ANESTHESIA (OUTPATIENT)
Dept: ENDOSCOPY | Age: 56
End: 2021-09-13
Payer: COMMERCIAL

## 2021-09-13 ENCOUNTER — ANESTHESIA EVENT (OUTPATIENT)
Dept: ENDOSCOPY | Age: 56
End: 2021-09-13
Payer: COMMERCIAL

## 2021-09-13 ENCOUNTER — HOSPITAL ENCOUNTER (OUTPATIENT)
Age: 56
Setting detail: OUTPATIENT SURGERY
Discharge: HOME | End: 2021-09-13
Attending: SURGERY | Admitting: SURGERY
Payer: COMMERCIAL

## 2021-09-13 ENCOUNTER — HOSPITAL ENCOUNTER (OUTPATIENT)
Dept: PREADMISSION TESTING | Age: 56
Setting detail: SPECIMEN
Discharge: HOME | End: 2021-09-13
Payer: COMMERCIAL

## 2021-09-13 VITALS
DIASTOLIC BLOOD PRESSURE: 78 MMHG | RESPIRATION RATE: 14 BRPM | OXYGEN SATURATION: 98 % | SYSTOLIC BLOOD PRESSURE: 156 MMHG

## 2021-09-13 VITALS
OXYGEN SATURATION: 100 % | RESPIRATION RATE: 20 BRPM | DIASTOLIC BLOOD PRESSURE: 84 MMHG | SYSTOLIC BLOOD PRESSURE: 151 MMHG | WEIGHT: 293 LBS | BODY MASS INDEX: 41.95 KG/M2 | HEART RATE: 67 BPM | HEIGHT: 70 IN | TEMPERATURE: 97.7 F

## 2021-09-13 PROBLEM — Z12.11 ENCOUNTER FOR SCREENING COLONOSCOPY: Status: ACTIVE | Noted: 2021-09-13

## 2021-09-13 LAB
SARS-COV-2, RAPID: NOT DETECTED
SPECIMEN DESCRIPTION: NORMAL

## 2021-09-13 PROCEDURE — 3700000001 HC ADD 15 MINUTES (ANESTHESIA): Performed by: SURGERY

## 2021-09-13 PROCEDURE — 3700000000 HC ANESTHESIA ATTENDED CARE: Performed by: SURGERY

## 2021-09-13 PROCEDURE — C9803 HOPD COVID-19 SPEC COLLECT: HCPCS

## 2021-09-13 PROCEDURE — 87635 SARS-COV-2 COVID-19 AMP PRB: CPT

## 2021-09-13 PROCEDURE — 6360000002 HC RX W HCPCS: Performed by: NURSE ANESTHETIST, CERTIFIED REGISTERED

## 2021-09-13 PROCEDURE — 2709999900 HC NON-CHARGEABLE SUPPLY: Performed by: SURGERY

## 2021-09-13 PROCEDURE — 3609027000 HC COLONOSCOPY: Performed by: SURGERY

## 2021-09-13 PROCEDURE — 7100000010 HC PHASE II RECOVERY - FIRST 15 MIN: Performed by: SURGERY

## 2021-09-13 PROCEDURE — 7100000011 HC PHASE II RECOVERY - ADDTL 15 MIN: Performed by: SURGERY

## 2021-09-13 PROCEDURE — 2500000003 HC RX 250 WO HCPCS: Performed by: NURSE ANESTHETIST, CERTIFIED REGISTERED

## 2021-09-13 PROCEDURE — 2580000003 HC RX 258: Performed by: SURGERY

## 2021-09-13 RX ORDER — SODIUM CHLORIDE, SODIUM LACTATE, POTASSIUM CHLORIDE, CALCIUM CHLORIDE 600; 310; 30; 20 MG/100ML; MG/100ML; MG/100ML; MG/100ML
INJECTION, SOLUTION INTRAVENOUS CONTINUOUS
Status: DISCONTINUED | OUTPATIENT
Start: 2021-09-13 | End: 2021-09-13 | Stop reason: HOSPADM

## 2021-09-13 RX ORDER — PROPOFOL 10 MG/ML
INJECTION, EMULSION INTRAVENOUS CONTINUOUS PRN
Status: DISCONTINUED | OUTPATIENT
Start: 2021-09-13 | End: 2021-09-13 | Stop reason: SDUPTHER

## 2021-09-13 RX ORDER — SODIUM CHLORIDE 9 MG/ML
25 INJECTION, SOLUTION INTRAVENOUS PRN
Status: DISCONTINUED | OUTPATIENT
Start: 2021-09-13 | End: 2021-09-13 | Stop reason: HOSPADM

## 2021-09-13 RX ORDER — SODIUM CHLORIDE 9 MG/ML
25 INJECTION, SOLUTION INTRAVENOUS PRN
Status: CANCELLED | OUTPATIENT
Start: 2021-09-13

## 2021-09-13 RX ORDER — SODIUM CHLORIDE 0.9 % (FLUSH) 0.9 %
5-40 SYRINGE (ML) INJECTION PRN
Status: DISCONTINUED | OUTPATIENT
Start: 2021-09-13 | End: 2021-09-13 | Stop reason: HOSPADM

## 2021-09-13 RX ORDER — SODIUM CHLORIDE, SODIUM LACTATE, POTASSIUM CHLORIDE, CALCIUM CHLORIDE 600; 310; 30; 20 MG/100ML; MG/100ML; MG/100ML; MG/100ML
INJECTION, SOLUTION INTRAVENOUS CONTINUOUS
Status: CANCELLED | OUTPATIENT
Start: 2021-09-13

## 2021-09-13 RX ORDER — SODIUM CHLORIDE 0.9 % (FLUSH) 0.9 %
10 SYRINGE (ML) INJECTION EVERY 12 HOURS SCHEDULED
Status: CANCELLED | OUTPATIENT
Start: 2021-09-13

## 2021-09-13 RX ORDER — SODIUM CHLORIDE 0.9 % (FLUSH) 0.9 %
5-40 SYRINGE (ML) INJECTION EVERY 12 HOURS SCHEDULED
Status: DISCONTINUED | OUTPATIENT
Start: 2021-09-13 | End: 2021-09-13 | Stop reason: HOSPADM

## 2021-09-13 RX ORDER — LIDOCAINE HYDROCHLORIDE 10 MG/ML
INJECTION, SOLUTION EPIDURAL; INFILTRATION; INTRACAUDAL; PERINEURAL PRN
Status: DISCONTINUED | OUTPATIENT
Start: 2021-09-13 | End: 2021-09-13 | Stop reason: SDUPTHER

## 2021-09-13 RX ORDER — PROPOFOL 10 MG/ML
INJECTION, EMULSION INTRAVENOUS PRN
Status: DISCONTINUED | OUTPATIENT
Start: 2021-09-13 | End: 2021-09-13 | Stop reason: SDUPTHER

## 2021-09-13 RX ORDER — SODIUM CHLORIDE 0.9 % (FLUSH) 0.9 %
10 SYRINGE (ML) INJECTION PRN
Status: CANCELLED | OUTPATIENT
Start: 2021-09-13

## 2021-09-13 RX ADMIN — PROPOFOL 100 MCG/KG/MIN: 10 INJECTION, EMULSION INTRAVENOUS at 10:29

## 2021-09-13 RX ADMIN — PROPOFOL 50 MG: 10 INJECTION, EMULSION INTRAVENOUS at 10:29

## 2021-09-13 RX ADMIN — SODIUM CHLORIDE, POTASSIUM CHLORIDE, SODIUM LACTATE AND CALCIUM CHLORIDE: 600; 310; 30; 20 INJECTION, SOLUTION INTRAVENOUS at 08:57

## 2021-09-13 RX ADMIN — LIDOCAINE HYDROCHLORIDE 50 MG: 10 INJECTION, SOLUTION EPIDURAL; INFILTRATION; INTRACAUDAL; PERINEURAL at 10:29

## 2021-09-13 ASSESSMENT — PAIN SCALES - GENERAL: PAINLEVEL_OUTOF10: 0

## 2021-09-13 ASSESSMENT — PAIN - FUNCTIONAL ASSESSMENT: PAIN_FUNCTIONAL_ASSESSMENT: 0-10

## 2021-09-13 NOTE — OP NOTE
[place] [address]                                OPERATIVE REPORT    PATIENT NAME: Eleanor Pérez                     :        1965  MED REC NO:   [id number]                              ROOM:  ACCOUNT NO:   [de-identified]                           ADMIT DATE: 2021  PROVIDER:     Soren Epps    DATE OF PROCEDURE:  2021    ATTENDING SURGEON:  Soren Epps MD    PCP:  Stephanie Hernandez MD    PREOPERATIVE DIAGNOSIS:  Screening colonoscopy. POSTOPERATIVE DIAGNOSIS:  Normal colon. OPERATION:  Colonoscopy, anus to cecum. ANESTHESIA:  MAC.    ESTIMATED BLOOD LOSS:  None. INDICATIONS:  The patient is a pleasant 27-year-old white female  presenting for screening colonoscopy. She has no GI complaints. No  epigastric pain nor reflux symptoms. Normal bowel movements. No  previous GI surgery nor endoscopy. BMI 45. History of obstructive  sleep apnea. Mother treated for breast cancer. Father  with  leukemia. No family history of GI malignancy nor polyps to her  knowledge. The patient has never smoked. At this time, screening  colonoscopy is indicated. OPERATIVE PROCEDURE:  After obtaining informed consent with discussion  of risks, benefits, and alternatives including a risk of GI bleeding,  perforation, missed lesions, COVID-19 exposure/infection, etc., the  patient was taken to the endoscopy suite and placed in the left lateral  recumbent position. Following adequate IV sedation, a digital rectal  exam was performed. Sphincter tone was normal.  A colonoscope was  passed transanally into the rectum and advanced with gentle insufflation  throughout the entirety of the colon to the cecum.   Cecal position was  confirmed by clear visualization of the ileocecal valve, the appendiceal  orifice, and transduction of manual pressure in the right lower quadrant  to the cecum.  The Sutab bowel prep was excellent. All colonic mucosa  was clearly visible. The cecum, ascending colon, and hepatic flexure  were normal.  Transverse colon and splenic flexure were also normal.   The descending colon and sigmoid were a bit redundant, but otherwise  normal.  Upper, middle, and lower portions of the rectum were normal.   On retroflexion, there were minimal internal hemorrhoids. The colon was  decompressed by suction as the scope was removed. No biopsies were  required. The patient tolerated the procedure well and was transferred  to PACU in stable condition. SPECIMENS:  None. DRAINS:  None. DISPOSITION:  To PACU, awake, alert, and stable. Following recovery, we  will discharge the patient home with gradual advancement of diet and  activity as tolerated with instructions for a healthy, balanced,  high-fiber, low-fat diet with fiber supplementation, increased water  intake and physical activity, decreased calories and sugar, etc.  Given  the patient has never had a history of colon polyps and there is no  family history of colon cancer, next recommended colonoscopy interval is  10 years, age 78. Follow up will be with me in 1 week to review  endoscopy.         Jen Fernandez    D: 09/13/2021 10:56:23       T: 09/13/2021 10:58:54     [email or web address]  Job#: [id number]     Doc#: [id number]    CC:  Soren Epps

## 2021-09-13 NOTE — ANESTHESIA PRE PROCEDURE
Department of Anesthesiology  Preprocedure Note       Name:  Nazanin Edwards   Age:  61 y.o.  :  1965                                          MRN:  [id number]         Date:  2021      Surgeon: Rajni Schneider):  Carl Dominguez MD    Procedure: Procedure(s):  COLONOSCOPY POSSIBLE BIOPSY    Medications prior to admission:   Prior to Admission medications    Medication Sig Start Date End Date Taking? Authorizing Provider   fluticasone (FLONASE) 50 MCG/ACT nasal spray USE 2 SPRAYS IN EACH NOSTRIL EVERYDAY 21  Yes Savanah Camarillo MD   metoprolol succinate (TOPROL XL) 25 MG extended release tablet TAKE 1 TABLET BY MOUTH EVERY DAY 21  Yes Savanah Camarillo MD       Current medications:    Current Facility-Administered Medications   Medication Dose Route Frequency Provider Last Rate Last Admin    lactated ringers infusion   IntraVENous Continuous Carl Dominguez  mL/hr at 21 0857 New Bag at 21 0857    sodium chloride flush 0.9 % injection 5-40 mL  5-40 mL IntraVENous 2 times per day Carl Dominguez MD        sodium chloride flush 0.9 % injection 5-40 mL  5-40 mL IntraVENous PRN Carl Dominguez MD        0.9 % sodium chloride infusion  25 mL IntraVENous PRN Carl Dominguez MD           Allergies:     Allergies   Allergen Reactions    Sulfa Antibiotics        Problem List:    Patient Active Problem List   Diagnosis Code    Bell's palsy G51.0    Ureterocele N28.89    ITZ (obstructive sleep apnea) G47.33       Past Medical History:        Diagnosis Date    Bell's palsy 2013    Carpal tunnel syndrome     Chicken pox     Hydronephrosis 2009    left     Hyperlipidemia 2013    Pneumonia     Renal atrophy     UTI (urinary tract infection)        Past Surgical History:        Procedure Laterality Date     SECTION      CYSTOSCOPY  2009    HYSTERECTOMY, TOTAL ABDOMINAL  2010    ROTATOR CUFF REPAIR Left 2017       Social History:    Social History     Tobacco Use  Smoking status: Never Smoker    Smokeless tobacco: Never Used   Substance Use Topics    Alcohol use: No     Alcohol/week: 0.0 standard drinks                                Counseling given: Not Answered      Vital Signs (Current):   Vitals:    09/07/21 1024 09/13/21 0848 09/13/21 0856   BP:   (!) 165/76   Pulse:   82   Resp:   16   Temp:   36.4 °C (97.6 °F)   TempSrc:   Temporal   SpO2:   95%   Weight: (!) 310 lb (140.6 kg) (!) 304 lb (137.9 kg)    Height: 5' 10\" (1.778 m) 5' 10\" (1.778 m)                                               BP Readings from Last 3 Encounters:   09/13/21 (!) 165/76   08/05/21 137/84   07/29/21 118/78       NPO Status: Time of last liquid consumption: 2100                        Time of last solid consumption: 2300                        Date of last liquid consumption: 09/12/21                        Date of last solid food consumption: 09/11/21    BMI:   Wt Readings from Last 3 Encounters:   09/13/21 (!) 304 lb (137.9 kg)   08/05/21 (!) 312 lb 4.8 oz (141.7 kg)   07/29/21 (!) 311 lb (141.1 kg)     Body mass index is 43.62 kg/m². CBC:   Lab Results   Component Value Date    WBC 7.0 09/05/2020    RBC 4.99 09/05/2020    HGB 14.3 09/05/2020    HCT 42.8 09/05/2020    MCV 85.8 09/05/2020    RDW 13.7 09/05/2020     09/05/2020       CMP:   Lab Results   Component Value Date     07/29/2021    K 4.3 07/29/2021     07/29/2021    CO2 22 07/29/2021    BUN 11 07/29/2021    CREATININE 0.58 07/29/2021    GFRAA >60 07/29/2021    LABGLOM >60 07/29/2021    GLUCOSE 108 07/29/2021    PROT 6.9 07/29/2021    CALCIUM 9.4 07/29/2021    BILITOT 0.58 07/29/2021    ALKPHOS 76 07/29/2021    AST 17 07/29/2021    ALT 12 07/29/2021       POC Tests: No results for input(s): POCGLU, POCNA, POCK, POCCL, POCBUN, POCHEMO, POCHCT in the last 72 hours.     Coags: No results found for: PROTIME, INR, APTT    HCG (If Applicable): No results found for: PREGTESTUR, PREGSERUM, HCG, HCGQUANT     ABGs: No results found for: PHART, PO2ART, EOG0XNP, AKU4TSB, BEART, F6GQACCK     Type & Screen (If Applicable):  No results found for: LABABO, LABRH    Drug/Infectious Status (If Applicable):  Lab Results   Component Value Date    HEPCAB NONREACTIVE 07/29/2021       COVID-19 Screening (If Applicable):   Lab Results   Component Value Date    COVID19 Not Detected 09/13/2021    COVID19 Not Detected 12/11/2020           Anesthesia Evaluation  Patient summary reviewed and Nursing notes reviewed  Airway: Mallampati: II        Dental:    (+) partials  Comment: Upper partial    Pulmonary:normal exam  breath sounds clear to auscultation  (+) pneumonia: resolved,  sleep apnea: on CPAP,                             Cardiovascular:  Exercise tolerance: no interval change,   (+) hyperlipidemia      ECG reviewed  Rhythm: regular  Rate: normal                    Neuro/Psych:   (+) neuromuscular disease (CTS):,              ROS comment: History of Bell's palsy GI/Hepatic/Renal:   (+) renal disease (History of ureterocele, hydronephrosis, and renal atrophy): no interval change, bowel prep, morbid obesity          Endo/Other: Negative Endo/Other ROS                    Abdominal:   (+) obese,     Abdomen: soft. Vascular: negative vascular ROS. Other Findings:           Anesthesia Plan      MAC     ASA 3       Induction: intravenous. Anesthetic plan and risks discussed with patient. Plan discussed with attending.                   ERNESTO Valentine - CRNA   9/13/2021

## 2021-09-13 NOTE — H&P
HPI     Ms Zainab Potts is a pleasant 53-year-old white female kindly referred to me by [facility] for screening colonoscopy. She has no GI complaints. No abdominal pain. No epigastric discomfort nor reflux symptoms. Daily bowel movements, formed and brown, without blood. No previous GI surgery nor endoscopy. No cough, fever nor other respiratory symptoms. No history of COVID-19, vaccination schedule is complete. No recent weight changes, 312 pounds, BMI 45. Obstructive sleep apnea. Mother treated for breast cancer,  in her [de-identified] with heart disease. Father  in his [de-identified] with leukemia. 2 children, son and daughter, 4 grandchildren, all healthy. No family history of GI malignancy nor polyps to her knowledge. She has never smoked.     Review of Systems   Constitutional: Negative for activity change, appetite change, chills, fever and unexpected weight change. HENT: Negative for nosebleeds, sneezing, sore throat and trouble swallowing. Eyes: Negative for visual disturbance. Respiratory: Positive for apnea. Negative for cough, choking and shortness of breath. Cardiovascular: Negative for chest pain, palpitations and leg swelling. Gastrointestinal: Negative for abdominal pain, blood in stool, nausea and vomiting. Genitourinary: Negative for dysuria, flank pain and hematuria. Musculoskeletal: Positive for arthralgias and back pain. Negative for gait problem and myalgias. Allergic/Immunologic: Negative for immunocompromised state. Neurological: Negative for dizziness, seizures, syncope, weakness and headaches. Hematological: Does not bruise/bleed easily.    Psychiatric/Behavioral: Negative for confusion and sleep disturbance.         Past Medical History        Past Medical History:   Diagnosis Date    Bell's palsy 2013    Carpal tunnel syndrome      Chicken pox      Hydronephrosis 2009     left     Hyperlipidemia 2013    Pneumonia      Renal atrophy      UTI (urinary tract infection)              Past Surgical History         Past Surgical History:   Procedure Laterality Date     SECTION        CYSTOSCOPY   2009    HYSTERECTOMY, TOTAL ABDOMINAL   2010    ROTATOR CUFF REPAIR Left 2017            Family History         Family History   Problem Relation Age of Onset    Cancer Mother           breast    Heart Disease Mother      Stroke Mother      Cancer Father              Allergies:  See list     Current Facility-Administered Medications          Current Outpatient Medications   Medication Sig Dispense Refill    fluticasone (FLONASE) 50 MCG/ACT nasal spray USE 2 SPRAYS IN EACH NOSTRIL EVERYDAY 1 Bottle 5    metoprolol succinate (TOPROL XL) 25 MG extended release tablet TAKE 1 TABLET BY MOUTH EVERY DAY 30 tablet 5    Phenazopyridine HCl (AZO-STANDARD PO) Take 2 tablets by mouth          No current facility-administered medications for this visit.            Social History   Social History            Socioeconomic History    Marital status:        Spouse name: None    Number of children: None    Years of education: None    Highest education level: None   Occupational History    None   Tobacco Use    Smoking status: Never Smoker    Smokeless tobacco: Never Used   Vaping Use    Vaping Use: Never used   Substance and Sexual Activity    Alcohol use: No       Alcohol/week: 0.0 standard drinks    Drug use: No    Sexual activity: None   Other Topics Concern    None   Social History Narrative    None      Social Determinants of Health          Financial Resource Strain: Low Risk     Difficulty of Paying Living Expenses: Not hard at all   Food Insecurity: No Food Insecurity    Worried About Running Out of Food in the Last Year: Never true    [place] of Food in the Last Year: Never true   Transportation Needs:     Lack of Transportation (Medical):      Lack of Transportation (Non-Medical):    Physical Activity:     Days of Exercise per Week:              IMAGING/LABS     CT ABDOMEN PELVIS WO CONTRAST Additional Contrast? None  Status: Final result   Order Providers     Authorizing Billing   DO Blanca Solis DO           Signed by     Signed Date/Time Phone Pager   Jay Jay Elam 7/03/2018  9:29 [phone]     Reading Providers     Read Date Phone Pager   Tonny Graves [address].Ramya 66. Jul 3, 2018  9:29 [phone]     Radiation Dose Estimates     No radiation information found for this patient   Narrative   CT ABDOMEN AND PELVIS WITHOUT CONTRAST        HISTORY: 60-year-old female complaining of new onset of left flank pain today       COMPARISON: None           TECHNIQUE: Unenhanced helical computerized tomography was performed and    multiplanar reformatted images were provided.      Dose reduction techniques    were achieved by using: automated exposure control and/or adjustment of mA    and/or kV according to patient size and/or use of iterative reconstruction    technique.            FINDINGS: There is a duplicated renal collecting system on the left with    obstruction of the upper pole moiety resulting in superior pole localized    hydronephrosis as well as hydroureter of the ureter draining the superior pole. This extends to the level of the bladder where there is a prominent cystic    structure seen in the midline and slightly to the left of midline measuring 3.6    cm in maximal diameter. 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 Alternatively, the cystic lesion could represent a large nabothian cyst, or    less likely, a cystic or necrotic cervical malignancy. A postcontrast CT    urogram could be obtained for further evaluation.       No urinary tract calculus is evident. The right kidney is unremarkable.       Minimal old calcified granulomatous change is seen within the spleen. No focal    hepatic abnormality is noted. The adrenal glands are not enlarged.  The aorta    and IVC have a normal unenhanced appearance. There are no findings of bowel    obstruction or free air. No free fluid is evident. The uterus is surgically    absent. The perirectal soft tissue-fat planes are intact.               Impression           1. Duplicated left renal collecting system with obstruction of the upper pole    moiety and diffuse dilatation of the ureter draining the upper pole to the    level of the urinary bladder where a 3.6 cm cystic lesion is identified in the    midline and extending slightly to the left of midline. This could represent an    ectopic ureterocele. Alternatively, the cystic lesion could represent a large    nabothian cyst or cystic/necrotic cervical malignancy. A CT urogram may yield    additional diagnostic information.       2. No urinary tract calculus       3. Status post hysterectomy                ASSESSMENT       Diagnosis Orders   1. Pre-op testing  EKG 12 Lead   2. Encounter for screening colonoscopy      3. ITZ (obstructive sleep apnea)      4. BMI 40.0-44.9, adult (HCC)      5. Family history of breast cancer in mother            PLAN     No significant interval changes since initial evaluation August 5, 2021. We will proceed with screening colonoscopy. Risks, benefits, alternatives thoroughly reviewed and accepted by Ms. Marlys Lamar, including GI bleeding, perforation, missed lesions, COVID-19 exposure/infection, etc.     Tonio Robert MD

## 2021-09-13 NOTE — ANESTHESIA POSTPROCEDURE EVALUATION
Department of Anesthesiology  Postprocedure Note    Patient: Perri Joshua  MRN: [id number]  YOB: 1965  Date of evaluation: 9/13/2021  Time:  11:15 AM     Procedure Summary     Date: 09/13/21 Room / Location: [address] [place] [address] ENDOSCOPY    Anesthesia Start: 7884 Anesthesia Stop: 1475    Procedure: COLONOSCOPY (N/A Abdomen) Diagnosis: (COLON SCREENING)    Surgeons: August Jensen MD Responsible Provider: ERNESTO Garcia CRNA    Anesthesia Type: MAC ASA Status: 3          Anesthesia Type: MAC    Julian Phase I: Julian Score: 10    Julian Phase II: Julian Score: 10    Last vitals: Reviewed and per EMR flowsheets.        Anesthesia Post Evaluation    Patient location during evaluation: PACU  Level of consciousness: awake and alert  Pain score: 0  Nausea & Vomiting: no nausea and no vomiting  Complications: no  Cardiovascular status: blood pressure returned to baseline and hemodynamically stable  Respiratory status: acceptable, room air and spontaneous ventilation  Hydration status: euvolemic

## 2021-09-13 NOTE — BRIEF OP NOTE
Brief Postoperative Note      Patient: Rona Glass  YOB: 1965  MRN: [id number]    Date of Procedure: 9/13/2021    Pre-Op Diagnosis:      1. Screening colonoscopy    Post-Op Diagnosis:      1. Normal colon       Operation:     1. Colonoscopy anus to cecum    Surgeon(s):  Katy Webb MD    Assistant:  * No surgical staff found *    Anesthesia: Monitor Anesthesia Care    Estimated Blood Loss (mL):  None    Complications: None    Specimens:  None    Findings:  As above.     Dictated # [id number]    Electronically signed by Katy Webb MD on 9/13/2021 at 10:51 AM

## 2021-09-27 ENCOUNTER — OFFICE VISIT (OUTPATIENT)
Dept: SURGERY | Age: 56
End: 2021-09-27
Payer: COMMERCIAL

## 2021-09-27 DIAGNOSIS — Z98.890 S/P COLONOSCOPY: Primary | ICD-10-CM

## 2021-09-27 DIAGNOSIS — Z80.3 FAMILY HISTORY OF BREAST CANCER IN MOTHER: ICD-10-CM

## 2021-09-27 DIAGNOSIS — G47.33 OSA (OBSTRUCTIVE SLEEP APNEA): ICD-10-CM

## 2021-09-27 PROCEDURE — 99212 OFFICE O/P EST SF 10 MIN: CPT | Performed by: SURGERY

## 2021-09-27 ASSESSMENT — ENCOUNTER SYMPTOMS
BLOOD IN STOOL: 0
SORE THROAT: 0
VOMITING: 0
NAUSEA: 0
TROUBLE SWALLOWING: 0
APNEA: 1
CHOKING: 0
BACK PAIN: 0
SHORTNESS OF BREATH: 0
ABDOMINAL PAIN: 0
COUGH: 0

## 2021-09-27 NOTE — LETTER
[address]  [address] [facility] [address]  Phone: [phone]  Fax: [phone]    Stephanie Barnett MD    September 27, 2021     Adrienne Hoover MD  [address] [place] [address]    Patient: Nikolas Haddad   MR Number: [id number]   YOB: 1965   Date of Visit: 9/27/2021       Dear Adrienne Hoover: Thank you for referring Magdalena Britton to me for evaluation/treatment. Below are the relevant portions of my assessment and plan of care. ASSESSMENT     Diagnosis Orders   1. S/P colonoscopy     2. ITZ (obstructive sleep apnea)     3. BMI 40.0-44.9, adult ([address]     4. Family history of breast cancer in mother         PLAN    Normal colonoscopy findings reviewed with Ms. Nasrin Avendano. Given that she has no personal history of colon polyps with no family history of colon cancer nor polyps to her knowledge, 10-year colonoscopy interval is appropriate. Recommend next screening colonoscopy age 78. If a family history of colon cancer becomes known or if new GI symptoms develop, consider earlier interval at that time. Discussed importance of a healthy, balanced high-fiber low-fat diet with fiber supplementation, increased water intake and physical activity, decreased calories and sugar, etc.     If you have questions, please do not hesitate to call me. I look forward to following Vladislav Mireles along with you.     Sincerely,      Stephanie Barnett MD

## 2021-09-27 NOTE — PATIENT INSTRUCTIONS
Patient Education        Learning About Cutting Calories  How do calories affect your weight? Food gives your body energy. Energy from the food you eat is measured in calories. This energy keeps your heart beating, your brain active, and your muscles working. Your body needs a certain number of calories each day. After your body uses the calories it needs, it stores extra calories as fat. To lose weight safely, you have to eat fewer calories while eating in a healthy way. How many calories do you need each day? The more active you are, the more calories you need. When you are less active, you need fewer calories. How many calories you need each day also depends on several things, including your age and whether you are male or female. Here are some general guidelines for adults:  · Less active women and older adults need 1,600 to 2,000 calories each day. · Active women and less active men need 2,000 to 2,400 calories each day. · Active men need 2,400 to 3,000 calories each day. How can you cut calories and eat healthy meals? Whole grains, vegetables and fruits, and dried beans are good lower-calorie foods. They give you lots of nutrients and fiber. And they fill you up. Sweets, energy drinks, and soda pop are high in calories. They give you few nutrients and no fiber. Try to limit soda pop, fruit juice, and energy drinks. Drink water instead. Some fats can be part of a healthy diet. But cutting back on fats from highly processed foods like fast foods and many snack foods is a good way to lower the calories in your diet. Also, use smaller amounts of fats like butter, margarine, salad dressing, and mayonnaise. Add fresh garlic, lemon, or herbs to your meals to add flavor without adding fat. Meats and dairy products can be a big source of hidden fats. Try to choose lean or low-fat versions of these products. Fat-free cookies, candies, chips, and frozen treats can still be high in sugar and calories.  Some fat-free foods have more calories than regular ones. Eat fat-free treats in moderation, as you would other foods. If your favorite foods are high in fat, salt, sugar, or calories, limit how often you eat them. Eat smaller servings, or look for healthy substitutes. Fill up on fruits, vegetables, and whole grains. Eating at home  · Use meat as a side dish instead of as the main part of your meal.  · Try main dishes that use whole wheat pasta, brown rice, dried beans, or vegetables. · Find ways to cook with little or no fat, such as broiling, steaming, or grilling. · Use cooking spray instead of oil. If you use oil, use a monounsaturated oil, such as canola or olive oil. · Trim fat from meats before you cook them. · Drain off fat after you brown the meat or while you roast it. · Chill soups and stews after you cook them. Then skim the fat off the top after it hardens. Eating out  · Order foods that are broiled or poached rather than fried or breaded. · Cut back on the amount of butter or margarine that you use on bread. · Order sauces, gravies, and salad dressings on the side, and use only a little. · When you order pasta, choose tomato sauce rather than cream sauce. · Ask for salsa with your baked potato instead of sour cream, butter, cheese, or prasad. · Order meals in a small size instead of upgrading to a large. · Share an entree, or take part of your food home to eat as another meal.  · Share appetizers and desserts. Where can you learn more? Go to https://[employer].healthPulse. org and sign in to your [employer] account. Enter R539 in the [facility] box to learn more about \"Learning About Cutting Calories. \"     If you do not have an account, please click on the \"Sign Up Now\" link. Current as of: December 17, 2020               Content Version: 13.0  © 0110-7134 Healthwise, Incorporated. Care instructions adapted under license by [facility] ([facility]).  If you have questions about a medical condition or this instruction, always ask your healthcare professional. [place] [address] any warranty or liability for your use of this information.

## 2021-09-27 NOTE — PROGRESS NOTES
Yan Gunter MD  General Surgery, Endoscopy  Chief Medical Officer    [facility]  [address] [address]  Dept: [phone]  Fax: [phone]    CHIEF COMPLAINT  No chief complaint on file. HPI    Ms Shanda Tamayo returns for follow-up by telephone after endoscopy. DATE OF PROCEDURE:  2021     ATTENDING SURGEON:  Kerry Marie MD     PCP:  Merissa Crawley MD     PREOPERATIVE DIAGNOSIS:  Screening colonoscopy.     POSTOPERATIVE DIAGNOSIS:  Normal colon.     OPERATION:  Colonoscopy, anus to cecum.     ANESTHESIA:  MAC.     ESTIMATED BLOOD LOSS:  None.     INDICATIONS:  The patient is a pleasant 30-year-old white female  presenting for screening colonoscopy. She has no GI complaints. No  epigastric pain nor reflux symptoms. Normal bowel movements. No  previous GI surgery nor endoscopy. BMI 45. History of obstructive  sleep apnea. Mother treated for breast cancer. Father  with  leukemia. No family history of GI malignancy nor polyps to her  knowledge. The patient has never smoked. At this time, screening  colonoscopy is indicated. She is doing well. No complaints. Review of Systems   Constitutional: Negative for activity change, appetite change, chills, fever and unexpected weight change. HENT: Negative for nosebleeds, sneezing, sore throat and trouble swallowing. Eyes: Negative for visual disturbance. Respiratory: Positive for apnea. Negative for cough, choking and shortness of breath. Cardiovascular: Negative for chest pain, palpitations and leg swelling. Gastrointestinal: Negative for abdominal pain, blood in stool, nausea and vomiting. Genitourinary: Negative for dysuria, flank pain and hematuria. Musculoskeletal: Positive for arthralgias. Negative for back pain, gait problem and myalgias. Allergic/Immunologic: Negative for immunocompromised state.    Neurological: Negative for dizziness, seizures, syncope, weakness and headaches. Hematological: Does not bruise/bleed easily. Psychiatric/Behavioral: Negative for confusion and sleep disturbance. Past Medical History:   Diagnosis Date    Bell's palsy 2013    Carpal tunnel syndrome     Chicken pox     Hydronephrosis 2009    left     Hyperlipidemia 2013    Pneumonia     Renal atrophy     UTI (urinary tract infection)        Past Surgical History:   Procedure Laterality Date     SECTION      COLONOSCOPY N/A 2021    [facility]  [facility]    HYSTERECTOMY, TOTAL ABDOMINAL  2010    ROTATOR CUFF REPAIR Left 2017       Family History   Problem Relation Age of Onset    Cancer Mother         breast    Heart Disease Mother     Stroke Mother     Cancer Father        Allergies:  See list    Current Outpatient Medications   Medication Sig Dispense Refill    fluticasone (FLONASE) 50 MCG/ACT nasal spray USE 2 SPRAYS IN EACH NOSTRIL EVERYDAY 1 Bottle 5    metoprolol succinate (TOPROL XL) 25 MG extended release tablet TAKE 1 TABLET BY MOUTH EVERY DAY 30 tablet 5     No current facility-administered medications for this visit.        Social History     Socioeconomic History    Marital status:      Spouse name: Not on file    Number of children: Not on file    Years of education: Not on file    Highest education level: Not on file   Occupational History    Not on file   Tobacco Use    Smoking status: Never Smoker    Smokeless tobacco: Never Used   Vaping Use    Vaping Use: Never used   Substance and Sexual Activity    Alcohol use: No     Alcohol/week: 0.0 standard drinks    Drug use: No    Sexual activity: Not on file   Other Topics Concern    Not on file   Social History Narrative    Not on file     Social Determinants of Health     Financial Resource Strain: Low Risk     Difficulty of Paying Living Expenses: Not hard at all   Food Insecurity: No Food Insecurity    Worried About [address] in the Last Year: Never true    [place] of Food in the Last Year: Never true   Transportation Needs:     Lack of Transportation (Medical):  Lack of Transportation (Non-Medical):    Physical Activity:     Days of Exercise per Week:     Minutes of Exercise per Session:    Stress:     Feeling of Stress :    Social Connections:     Frequency of Communication with Friends and Family:     Frequency of Social Gatherings with Friends and Family:     Attends [employer] Services:     Active Member of Clubs or Organizations:     Attends Club or Organization Meetings:     Marital Status:    Intimate Partner Violence:     Fear of Current or Ex-Partner:     Emotionally Abused:     Physically Abused:     Sexually Abused: There were no vitals taken for this visit. Physical Exam    ASSESSMENT     Diagnosis Orders   1. S/P colonoscopy     2. ITZ (obstructive sleep apnea)     3. BMI 40.0-44.9, adult ([facility] [address]     4. Family history of breast cancer in mother         PLAN    Normal colonoscopy findings reviewed with Ms. Solange Brittanie. Given that she has no personal history of colon polyps with no family history of colon cancer nor polyps to her knowledge, 10-year colonoscopy interval is appropriate. Recommend next screening colonoscopy age 78. If a family history of colon cancer becomes known or if new GI symptoms develop, consider earlier interval at that time.  Discussed importance of a healthy, balanced high-fiber low-fat diet with fiber supplementation, increased water intake and physical activity, decreased calories and sugar, etc.     Amanda Cazares MD

## 2021-10-13 PROBLEM — Z12.11 ENCOUNTER FOR SCREENING COLONOSCOPY: Status: RESOLVED | Noted: 2021-09-13 | Resolved: 2021-10-13

## 2021-10-14 RX ORDER — METOPROLOL SUCCINATE 25 MG/1
TABLET, EXTENDED RELEASE ORAL
Qty: 30 TABLET | Refills: 5 | Status: SHIPPED | OUTPATIENT
Start: 2021-10-14 | End: 2022-04-11

## 2021-10-29 ENCOUNTER — OFFICE VISIT (OUTPATIENT)
Dept: FAMILY MEDICINE CLINIC | Age: 56
End: 2021-10-29
Payer: COMMERCIAL

## 2021-10-29 VITALS
TEMPERATURE: 98.3 F | OXYGEN SATURATION: 97 % | WEIGHT: 293 LBS | HEART RATE: 86 BPM | DIASTOLIC BLOOD PRESSURE: 82 MMHG | BODY MASS INDEX: 44.62 KG/M2 | SYSTOLIC BLOOD PRESSURE: 134 MMHG

## 2021-10-29 DIAGNOSIS — N30.01 ACUTE CYSTITIS WITH HEMATURIA: Primary | ICD-10-CM

## 2021-10-29 LAB
BILIRUBIN, POC: ABNORMAL
BLOOD URINE, POC: ABNORMAL
CLARITY, POC: ABNORMAL
COLOR, POC: YELLOW
GLUCOSE URINE, POC: ABNORMAL
KETONES, POC: ABNORMAL
LEUKOCYTE EST, POC: ABNORMAL
NITRITE, POC: ABNORMAL
PH, POC: 5.5
PROTEIN, POC: 100
SPECIFIC GRAVITY, POC: 1.02
UROBILINOGEN, POC: 0.2

## 2021-10-29 PROCEDURE — 99213 OFFICE O/P EST LOW 20 MIN: CPT | Performed by: NURSE PRACTITIONER

## 2021-10-29 PROCEDURE — 81002 URINALYSIS NONAUTO W/O SCOPE: CPT | Performed by: NURSE PRACTITIONER

## 2021-10-29 RX ORDER — CIPROFLOXACIN 500 MG/1
500 TABLET, FILM COATED ORAL 2 TIMES DAILY
Qty: 14 TABLET | Refills: 0 | Status: SHIPPED | OUTPATIENT
Start: 2021-10-29 | End: 2021-11-12 | Stop reason: SDUPTHER

## 2021-10-29 ASSESSMENT — ENCOUNTER SYMPTOMS
SHORTNESS OF BREATH: 0
NAUSEA: 0
COUGH: 0
VOMITING: 0
DIARRHEA: 0

## 2021-10-29 NOTE — PATIENT INSTRUCTIONS
SURVEY:    You may be receiving a survey from [employer] regarding your visit today. Please complete the survey to enable us to provide the highest quality of care to you and your family. If you cannot score us a very good (5 Stars) on any question, please call the office to discuss how we could have made your experience a very good one. Thank you.     Clinical Care Team: DEVIN Roberts LPN    Clerical Team: Ale Haley

## 2021-10-29 NOTE — PROGRESS NOTES
HPI Notes    Name: Christelle Dumont  : 1965         Chief Complaint:     Chief Complaint   Patient presents with    Urinary Tract Infection     Patient complains of abdomen pain, feels like when she gets UTI       History of Present Illness:        Urinary Tract Infection   This is a new problem. The current episode started yesterday. The problem occurs intermittently. The quality of the pain is described as aching. The pain is mild. There has been no fever. There is no history of pyelonephritis. Associated symptoms include frequency and urgency. Pertinent negatives include no chills, hematuria, nausea or vomiting. She has tried nothing for the symptoms. Hx of UTI, most recently on 3/18/21. Past Medical History:     Past Medical History:   Diagnosis Date    Bell's palsy 2013    Carpal tunnel syndrome     Chicken pox     Hydronephrosis     left     Hyperlipidemia 2013    Pneumonia     Renal atrophy     UTI (urinary tract infection)       Reviewed all health maintenance requirements and ordered appropriate tests  Health Maintenance Due   Topic Date Due    HIV screen  Never done    DTaP/Tdap/Td vaccine (1 - Tdap) Never done    Shingles Vaccine (1 of 2) Never done    Flu vaccine (1) 2021       Past Surgical History:     Past Surgical History:   Procedure Laterality Date     SECTION      COLONOSCOPY N/A 2021    COL[facility]      HYSTERECTOMY, TOTAL ABDOMINAL  2010    ROTATOR CUFF REPAIR Left 2017        Medications:       Prior to Admission medications    Medication Sig Start Date End Date Taking?  Authorizing Provider   metoprolol succinate (TOPROL XL) 25 MG extended release tablet TAKE 1 TABLET BY MOUTH EVERY DAY 10/14/21  Yes Alfredo Capellan MD   fluticasone [facility]) 50 MCG/ACT nasal spray USE 2 SPRAYS IN EACH NOSTRIL EVERYDAY 21  Yes Alfredo Capellan MD        Allergies: Sulfa antibiotics    Social History:     Tobacco:    reports that she has never smoked. She has never used smokeless tobacco.  Alcohol:      reports no history of alcohol use. Drug Use:  reports no history of drug use. Family History:        Family History   Problem Relation Age of Onset    Cancer Mother         breast    Heart Disease Mother     Stroke Mother     Cancer Father        Review of Systems:         Review of Systems   Constitutional: Negative for chills and fever. Respiratory: Negative for cough and shortness of breath. Cardiovascular: Negative for chest pain and palpitations. Gastrointestinal: Negative for diarrhea, nausea and vomiting. Genitourinary: Positive for frequency and urgency. Negative for hematuria. Neurological: Negative for dizziness, seizures and headaches. Physical Exam:     Vitals:  /82   Pulse 86   Temp 98.3 °F (36.8 °C) (Oral)   Wt (!) 311 lb (141.1 kg)   SpO2 97%   BMI 44.62 kg/m²       Physical Exam  Vitals and nursing note reviewed. Constitutional:       Appearance: Normal appearance. She is well-developed. Cardiovascular:      Rate and Rhythm: Normal rate and regular rhythm. Heart sounds: Normal heart sounds, S1 normal and S2 normal.   Pulmonary:      Effort: Pulmonary effort is normal. No respiratory distress. Breath sounds: Normal breath sounds. Abdominal:      General: Bowel sounds are normal.      Palpations: Abdomen is soft. Tenderness: There is abdominal tenderness in the suprapubic area. Skin:     General: Skin is warm and dry. Neurological:      Mental Status: She is alert and oriented to person, place, and time.                Data:     Lab Results   Component Value Date     07/29/2021    K 4.3 07/29/2021     07/29/2021    CO2 22 07/29/2021    BUN 11 07/29/2021    CREATININE 0.58 07/29/2021    GLUCOSE 108 07/29/2021    PROT 6.9 07/29/2021    LABALBU 4.2 07/29/2021    BILITOT 0.58 07/29/2021 requested or ordered in this encounter         [place] received counseling on the following healthy behaviors: nutrition, exercise and medication adherence  Reviewed prior labs and health maintenance. Continue current medications, diet and exercise. Discussed use, benefit, and side effects of prescribed medications. Barriers to medication compliance addressed. Patient given educational materials - see patient instructions. All patient questions answered. Patient voiced understanding.

## 2021-11-12 ENCOUNTER — TELEPHONE (OUTPATIENT)
Dept: FAMILY MEDICINE CLINIC | Age: 56
End: 2021-11-12

## 2021-11-12 DIAGNOSIS — N39.0 RECURRENT UTI: Primary | ICD-10-CM

## 2021-11-12 RX ORDER — CIPROFLOXACIN 500 MG/1
500 TABLET, FILM COATED ORAL 2 TIMES DAILY
Qty: 20 TABLET | Refills: 0 | Status: SHIPPED | OUTPATIENT
Start: 2021-11-12 | End: 2021-11-22

## 2021-11-12 NOTE — TELEPHONE ENCOUNTER
Rk Lynn is c/o having another UTI. She was in the office and saw Lisette Desai on 10/29 for a UTI. She has cloudy urine, pain and frequency. Wanting to know if something could be called in for her? Please let Rk Lynn know. Health Maintenance   Topic Date Due    HIV screen  Never done    DTaP/Tdap/Td vaccine (1 - Tdap) Never done    Shingles Vaccine (1 of 2) Never done    Flu vaccine (1) 09/01/2021    COVID-19 Vaccine (3 - Booster for Moderna series) 09/26/2021    A1C test (Diabetic or Prediabetic)  07/29/2022    Breast cancer screen  10/14/2022    Lipid screen  07/29/2026    Colon cancer screen colonoscopy  09/13/2031    Hepatitis C screen  Completed    Hepatitis A vaccine  Aged Out    Hepatitis B vaccine  Aged Out    Hib vaccine  Aged Out    Meningococcal (ACWY) vaccine  Aged Out    Pneumococcal 0-64 years Vaccine  Aged Out             (applicable per patient's age: Cancer Screenings, Depression Screening, Fall Risk Screening, Immunizations)    Hemoglobin A1C (%)   Date Value   07/29/2021 6.0   09/12/2018 5.7   11/25/2013 5.8     LDL Cholesterol (mg/dL)   Date Value   07/29/2021 152 (H)     LDL Calculated (mg/dL)   Date Value   02/03/2014 92     AST (U/L)   Date Value   07/29/2021 17     ALT (U/L)   Date Value   07/29/2021 12     BUN (mg/dL)   Date Value   07/29/2021 11      (goal A1C is < 7)   (goal LDL is <100) need 30-50% reduction from baseline     BP Readings from Last 3 Encounters:   10/29/21 134/82   09/13/21 (!) 151/84   09/13/21 (!) 156/78    (goal /80)      All Future Testing planned in [facility]:  Lab Frequency Next Occurrence   POCT Fecal Immunochemical Test (FIT) Once 04/04/2022       Next Visit Date:  No future appointments.          Patient Active Problem List:     Bell's palsy     Ureterocele     ITZ (obstructive sleep apnea)

## 2021-11-12 NOTE — TELEPHONE ENCOUNTER
Last OV 10/29/21 dx with acute cystitis with hematuria  You prescribed cipro 500mg one bid x 7 days  Allergy to sulfa

## 2021-11-12 NOTE — TELEPHONE ENCOUNTER
Patient notified of cipro being sent to pharmacy and urine sample and culture to be done prior to starting med    Patient said she already took a couple at home that was left over from her   She is going to reestablish with urology

## 2021-11-12 NOTE — TELEPHONE ENCOUNTER
Ideally I would love to be able to test a urine and send it for culture prior to starting antibiotic. Will start Cipro 500 mg p.o. twice daily x10 days. Patient should also reestablish with urology.

## 2021-11-16 ENCOUNTER — OFFICE VISIT (OUTPATIENT)
Dept: UROLOGY | Age: 56
End: 2021-11-16
Payer: COMMERCIAL

## 2021-11-16 ENCOUNTER — HOSPITAL ENCOUNTER (OUTPATIENT)
Age: 56
Setting detail: SPECIMEN
Discharge: HOME | End: 2021-11-16
Payer: COMMERCIAL

## 2021-11-16 VITALS
HEIGHT: 70 IN | SYSTOLIC BLOOD PRESSURE: 136 MMHG | DIASTOLIC BLOOD PRESSURE: 88 MMHG | WEIGHT: 293 LBS | BODY MASS INDEX: 41.95 KG/M2 | TEMPERATURE: 98.2 F

## 2021-11-16 DIAGNOSIS — R30.0 DYSURIA: ICD-10-CM

## 2021-11-16 DIAGNOSIS — N39.0 FREQUENT UTI: ICD-10-CM

## 2021-11-16 DIAGNOSIS — R30.0 DYSURIA: Primary | ICD-10-CM

## 2021-11-16 LAB
-: ABNORMAL
AMORPHOUS: ABNORMAL
BACTERIA: ABNORMAL
BILIRUBIN URINE: NEGATIVE
CASTS UA: ABNORMAL /LPF
COLOR: YELLOW
COMMENT UA: ABNORMAL
CRYSTALS, UA: ABNORMAL /HPF
EPITHELIAL CELLS UA: ABNORMAL /HPF (ref 0–25)
GLUCOSE URINE: NEGATIVE
KETONES, URINE: NEGATIVE
LEUKOCYTE ESTERASE, URINE: ABNORMAL
MUCUS: ABNORMAL
NITRITE, URINE: POSITIVE
OTHER OBSERVATIONS UA: ABNORMAL
PH UA: 6 (ref 5–9)
PROTEIN UA: NEGATIVE
RBC UA: ABNORMAL /HPF (ref 0–2)
RENAL EPITHELIAL, UA: ABNORMAL /HPF
SPECIFIC GRAVITY UA: >1.03 (ref 1.01–1.02)
TRICHOMONAS: ABNORMAL
TURBIDITY: CLEAR
URINE HGB: ABNORMAL
UROBILINOGEN, URINE: NORMAL
WBC UA: ABNORMAL /HPF (ref 0–5)
YEAST: ABNORMAL

## 2021-11-16 PROCEDURE — 81001 URINALYSIS AUTO W/SCOPE: CPT

## 2021-11-16 PROCEDURE — 87086 URINE CULTURE/COLONY COUNT: CPT

## 2021-11-16 PROCEDURE — 99213 OFFICE O/P EST LOW 20 MIN: CPT | Performed by: PHYSICIAN ASSISTANT

## 2021-11-16 PROCEDURE — 87077 CULTURE AEROBIC IDENTIFY: CPT

## 2021-11-16 PROCEDURE — 87186 SC STD MICRODIL/AGAR DIL: CPT

## 2021-11-16 RX ORDER — PHENAZOPYRIDINE HYDROCHLORIDE 100 MG/1
100 TABLET, FILM COATED ORAL 3 TIMES DAILY PRN
Qty: 20 TABLET | Refills: 0 | Status: SHIPPED | OUTPATIENT
Start: 2021-11-16 | End: 2022-07-13

## 2021-11-16 ASSESSMENT — ENCOUNTER SYMPTOMS
COUGH: 0
APNEA: 0
WHEEZING: 0
EYE REDNESS: 0
SHORTNESS OF BREATH: 0
COLOR CHANGE: 0
BACK PAIN: 0
CONSTIPATION: 0
VOMITING: 0
ABDOMINAL PAIN: 0
NAUSEA: 0

## 2021-11-16 NOTE — PROGRESS NOTES
HPI:    Patient is a 61 y.o. female in no acute distress. She is alert and oriented to person, place, and time. History  7/2018 Referral from the ER for frequent UTI. She does complain of dysuria, frequency and abdominal pain when she develops a UTI. CT scan completed on the ER. This film was independently reviewed and reviewed with Dr. 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 There is a questionable ectopic ureterocele. The radiologist does feel that this could represent a large nabothian cyst or cystic/necrotic cervical malignancy. Patient did have a total abdominal hysterectomy in 2010 due to endometriosis with Dr. Reji Marte. It is unclear if this is a radical hysterectomy with complete removal of the cervix. Patient was a previous patient of Dr. Marci Sanders for frequent urinary tract infections. She denies constipation. .     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 Cystoscopy showed fullness on the left side of the floor of the bladder,  no distinct cystocele seen. 12/10/2018 last seen    LOST TO FOLLOW UP    Today  Patient is here today for to resume care for recurrent urinary tract infection. Patient was last seen by our office 12/10/2018. She was lost to follow-up. Patient states that she is not feeling well. She has pain on urination. She states that her urine is more cloudy and malodorous. She has left lower quadrant pain. She states that she saw her PCP on 10/29/2021. She did have a urine dipstick on 10/29/2021 which showed moderate point-of-care blood, small leukocytes and negative nitrates. She was give 7 days of cipro. She did feel better, then symptoms started agin.  She took an extra dose of cipro she had at home and notified PCP. As she started another antibiotic on her own without notifying anyone they did not get a urine culture. PCP gave another course of cipro. She is currently on a 10-day course of Cipro. She is 5 days in and is not feeling much better. Denies fever and chills. Dark soda in the morning. Water the rest of day. Has daily BMs. Denies flank pain. Past Medical History:   Diagnosis Date    Bell's palsy 2013    Carpal tunnel syndrome     Chicken pox     Hydronephrosis 2009    left     Hyperlipidemia 2013    Pneumonia     Renal atrophy     UTI (urinary tract infection)      Past Surgical History:   Procedure Laterality Date     SECTION      COLONOSCOPY N/A 2021    COLON[facility] performed [facility] Clemons [facility] at [facility]  2009    HYSTERECTOMY, TOTAL ABDOMINAL  2010    ROTATOR CUFF REPAIR Left 2017     Outpatient Encounter Medications as of 2021   Medication Sig Dispense Refill    phenazopyridine (PYRIDIUM) 100 MG tablet Take 1 tablet by mouth 3 times daily as needed for Pain 20 tablet 0    ciprofloxacin (CIPRO) 500 MG tablet Take 1 tablet by mouth 2 times daily for 10 days 20 tablet 0    metoprolol succinate (TOPROL XL) 25 MG extended release tablet TAKE 1 TABLET BY MOUTH EVERY DAY 30 tablet 5    fluticasone (FLONASE) 50 MCG/ACT nasal spray USE 2 SPRAYS IN EACH NOSTRIL EVERYDAY 1 Bottle 5     No facility-administered encounter medications on file as of 2021.       Current Outpatient Medications on File Prior to Visit   Medication Sig Dispense Refill    ciprofloxacin (CIPRO) 500 MG tablet Take 1 tablet by mouth 2 times daily for 10 days 20 tablet 0    metoprolol succinate (TOPROL XL) 25 MG extended release tablet TAKE 1 TABLET BY MOUTH EVERY DAY 30 tablet 5    fluticasone (FLONASE) 50 MCG/ACT nasal spray USE 2 SPRAYS IN EACH NOSTRIL EVERYDAY 1 Bottle 5     No current facility-administered medications on file prior to visit. Sulfa antibiotics  Family History   Problem Relation Age of Onset    Cancer Mother         breast    Heart Disease Mother     Stroke Mother     Cancer Father      Social History     Tobacco Use   Smoking Status Never Smoker   Smokeless Tobacco Never Used       Social History     Substance and Sexual Activity   Alcohol Use No    Alcohol/week: 0.0 standard drinks       Review of Systems   Constitutional: Negative for appetite change, chills and fever. Eyes: Negative for redness and visual disturbance. Respiratory: Negative for apnea, cough, shortness of breath and wheezing. Cardiovascular: Negative for chest pain and leg swelling. Gastrointestinal: Negative for abdominal pain, constipation, nausea and vomiting. Genitourinary: Positive for dysuria. Negative for difficulty urinating, dyspareunia, enuresis, flank pain, frequency, hematuria, pelvic pain, urgency, vaginal bleeding and vaginal discharge. Musculoskeletal: Negative for back pain, joint swelling and myalgias. Skin: Negative for color change, rash and wound. Neurological: Negative for dizziness, tremors and numbness. Hematological: Negative for adenopathy. Does not bruise/bleed easily. Psychiatric/Behavioral: Negative for sleep disturbance. /88 (Site: Right Upper Arm, Position: Sitting, Cuff Size: Large Adult)   Temp 98.2 °F (36.8 °C) (Temporal)   Ht 5' 10\" (1.778 m)   Wt (!) 309 lb (140.2 kg)   BMI 44.34 kg/m²       PHYSICAL EXAM:  Constitutional: Patient resting comfortably, in no acute distress. Neuro: Alert and oriented to person place and time. Psych: Mood and affect normal.  HEENT: normocephalic, atraumatic  Lungs: Respiratory effort normal, unlabored  Abdomen: Soft, non-tender, non-distended  : No CVA tenderness.   Pelvic: deferred     Lab Results   Component Value Date    BUN 11 07/29/2021     Lab Results   Component Value Date    CREATININE 0.58 07/29/2021       ASSESSMENT:   Diagnosis Orders 1. Dysuria  Culture, Urine    Urinalysis with Microscopic   2. Frequent UTI         PLAN:  As patient continues to have dysuria despite being on a second course of antibiotics we will check a urinalysis and culture. We will call with results. Encourage drinking at least 80 ounces of water a day and avoiding bladder irritants    Continue current antibiotic    Pyridium as needed    Follow-up in 2 weeks to readdress her symptoms    If she develops flank pain, fever, chills she needs to go to the emergency room.

## 2021-11-16 NOTE — PATIENT INSTRUCTIONS
SURVEY:    You may be receiving a survey from [employer] regarding your visit today. Please complete the survey to enable us to provide the highest quality of care to you and your family. If you cannot score us a very good on any question, please call the office to discuss how we could of made your experience a very good one. Thank you.

## 2021-11-18 ENCOUNTER — TELEPHONE (OUTPATIENT)
Dept: UROLOGY | Age: 56
End: 2021-11-18

## 2021-11-18 LAB
CULTURE: ABNORMAL
Lab: ABNORMAL
SPECIMEN DESCRIPTION: ABNORMAL

## 2021-11-18 RX ORDER — NITROFURANTOIN 25; 75 MG/1; MG/1
100 CAPSULE ORAL 2 TIMES DAILY
Qty: 28 CAPSULE | Refills: 0 | Status: SHIPPED | OUTPATIENT
Start: 2021-11-18 | End: 2021-12-02

## 2021-11-18 NOTE — TELEPHONE ENCOUNTER
Patient made aware of positive urine culture results and to stop the cipro and start the macrobid. Patient says she was told by her mother when she was a child that she had a reaction to bactrim but she does not know what her reaction was.

## 2021-11-18 NOTE — TELEPHONE ENCOUNTER
UACS is positive for infection. This bacteria is resistant to Cipro which does explain why you are not feeling better. STOP CIPRO. I sent in culture specific macrobid. Please find out what her allergy is to sulfa antibiotics    Take this antibiotic until gone. Take this with food and eat yogurt once per day to prevent GI upset. If you develop nausea, vomiting, or fevers call the office or go to the ER. If your urinary symptoms do not improve once completing the antibiotics call our office.        (If she does not feel better after the full course of Macrobid (and has true allergy to sulfa), we may need to consider IV antibiotics)

## 2021-11-24 ENCOUNTER — TELEPHONE (OUTPATIENT)
Dept: UROLOGY | Age: 56
End: 2021-11-24

## 2021-11-24 NOTE — TELEPHONE ENCOUNTER
On 11/18/2021 our office called the patient and spoke with her to tell her to stop taking the Cipro and to start the [address]. Her bacteria is resistant to Cipro. Please ensure that she is taking Macrobid.

## 2021-11-24 NOTE — TELEPHONE ENCOUNTER
Trenton Walton was in on 11/16/2021 and given Pyridium and Cipro. She states she gets a crampy, uncomfortable pain when the pyridium wears off in the afternoon. She thinks she might still have an infection and isn't sure if the Cipro is working. She wants to know what to do?       Health Maintenance   Topic Date Due    HIV screen  Never done    DTaP/Tdap/Td vaccine (1 - Tdap) Never done    Shingles Vaccine (1 of 2) Never done    Flu vaccine (1) 09/01/2021    COVID-19 Vaccine (3 - Booster for Moderna series) 09/26/2021    A1C test (Diabetic or Prediabetic)  07/29/2022    Breast cancer screen  10/14/2022    Lipid screen  07/29/2026    Colon cancer screen colonoscopy  09/13/2031    Hepatitis C screen  Completed    Hepatitis A vaccine  Aged Out    Hepatitis B vaccine  Aged Out    Hib vaccine  Aged Out    Meningococcal (ACWY) vaccine  Aged Out    Pneumococcal 0-64 years Vaccine  Aged Out             (applicable per patient's age: Cancer Screenings, Depression Screening, Fall Risk Screening, Immunizations)    Hemoglobin A1C (%)   Date Value   07/29/2021 6.0   09/12/2018 5.7   11/25/2013 5.8     LDL Cholesterol (mg/dL)   Date Value   07/29/2021 152 (H)     LDL Calculated (mg/dL)   Date Value   02/03/2014 92     AST (U/L)   Date Value   07/29/2021 17     ALT (U/L)   Date Value   07/29/2021 12     BUN (mg/dL)   Date Value   07/29/2021 11      (goal A1C is < 7)   (goal LDL is <100) need 30-50% reduction from baseline     BP Readings from Last 3 Encounters:   11/16/21 136/88   10/29/21 134/82   09/13/21 (!) 151/84    (goal /80)      All Future Testing planned in [facility]:  Lab Frequency Next Occurrence   POCT Fecal Immunochemical Test (FIT) Once 04/04/2022       Next Visit Date:  Future Appointments   Date Time Provider Maykel Iqbal   12/1/2021  8:00 AM Joanna Chavez PA-C [facility]            Patient Active Problem List:     Bell's palsy     Ureterocele     ITZ (obstructive sleep apnea)

## 2021-12-01 ENCOUNTER — OFFICE VISIT (OUTPATIENT)
Dept: UROLOGY | Age: 56
End: 2021-12-01
Payer: COMMERCIAL

## 2021-12-01 VITALS
TEMPERATURE: 97.1 F | DIASTOLIC BLOOD PRESSURE: 86 MMHG | HEART RATE: 76 BPM | BODY MASS INDEX: 41.95 KG/M2 | WEIGHT: 293 LBS | HEIGHT: 70 IN | SYSTOLIC BLOOD PRESSURE: 139 MMHG

## 2021-12-01 DIAGNOSIS — B96.29 URINARY TRACT INFECTION DUE TO EXTENDED-SPECTRUM BETA LACTAMASE (ESBL) PRODUCING ESCHERICHIA COLI: Primary | ICD-10-CM

## 2021-12-01 DIAGNOSIS — N95.2 VAGINAL ATROPHY: ICD-10-CM

## 2021-12-01 DIAGNOSIS — N39.0 URINARY TRACT INFECTION DUE TO EXTENDED-SPECTRUM BETA LACTAMASE (ESBL) PRODUCING ESCHERICHIA COLI: Primary | ICD-10-CM

## 2021-12-01 DIAGNOSIS — N39.0 FREQUENT UTI: ICD-10-CM

## 2021-12-01 DIAGNOSIS — Z16.12 URINARY TRACT INFECTION DUE TO EXTENDED-SPECTRUM BETA LACTAMASE (ESBL) PRODUCING ESCHERICHIA COLI: Primary | ICD-10-CM

## 2021-12-01 PROCEDURE — 51798 US URINE CAPACITY MEASURE: CPT | Performed by: PHYSICIAN ASSISTANT

## 2021-12-01 PROCEDURE — 99214 OFFICE O/P EST MOD 30 MIN: CPT | Performed by: PHYSICIAN ASSISTANT

## 2021-12-01 RX ORDER — ESTRADIOL 0.1 MG/G
CREAM VAGINAL
Qty: 42.5 G | Refills: 3 | Status: SHIPPED | OUTPATIENT
Start: 2021-12-01 | End: 2022-07-29

## 2021-12-01 ASSESSMENT — ENCOUNTER SYMPTOMS
SHORTNESS OF BREATH: 0
CONSTIPATION: 0
APNEA: 0
COLOR CHANGE: 0
WHEEZING: 0
BACK PAIN: 0
EYE REDNESS: 0
VOMITING: 0
NAUSEA: 0
ABDOMINAL PAIN: 0
COUGH: 0

## 2021-12-01 NOTE — PROGRESS NOTES
HPI:    Patient is a 61 y.o. female in no acute distress. She is alert and oriented to person, place, and time. History  2015 - frequent UTI. Pt has had three in the past few months. Pt did go see Dr. Jayshree Rajput. She was taking a daily abx and then stopped about three years ago. 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 Pt did have a hysterectomy with oophorectoimy about 5 yrs ago. Pt has not done any hormone replacement therapy. Pt does get about 5 UTI a yr for ten yrs. Pt does leak a little urine when she coughs. Pt has two children born via . Pt is only minimally sexually active. She does report dysparuenia. Patient does not currently have any active symptoms. She does report that her urinary tract infection symptoms to come on very fast.  No gross hematuria or dysuria. Vaginal estrogen    LOST TO FOLLOW UP    2018 Referral from the ER for frequent UTI. She does complain of dysuria, frequency and abdominal pain when she develops a UTI. CT scan completed on the ER. This film was independently reviewed and reviewed with Dr. 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 There is a questionable ectopic ureterocele. The radiologist does feel that this could represent a large nabothian cyst or cystic/necrotic cervical malignancy. Patient did have a total abdominal hysterectomy in  due to endometriosis with Dr. Curly Currie. It is unclear if this is a radical hysterectomy with complete removal of the cervix. Patient was a previous patient of Dr. Jayshree Rajput for frequent urinary tract infections. She denies constipation.     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   Cystoscopy showed fullness on the left side of the floor of the bladder,  no distinct cystocele seen. 12/10/2018 last seen    LOST TO FOLLOW UP    2021 -recurrent urinary tract infection returned to reestablish care    Patient is uncertain what her reaction to sulfa and Bactrim is, she states this was as a child    Today  Patient was seen earlier in the month reestablish care for recurrent urinary tract infection. We did check her urine at last visit and she did end up having a multidrug-resistant E. Coli. We did change her from antibiotic she was put on by her PCP to culture specific Macrobid for 14 days. Patient overall is feeling better. She denies any fever, chills, gross hematuria, flank pain, dysuria. She is having a daily bowel movement which soften his past.  She is drinking adequate amounts of water. Bladderscan performed in office today: Patient voided - 30 mL, PVR - 30 mL      Past Medical History:   Diagnosis Date    Bell's palsy 2013    Carpal tunnel syndrome     Chicken pox     Hydronephrosis 2009    left     Hyperlipidemia 2013    Pneumonia     Renal atrophy     UTI (urinary tract infection)      Past Surgical History:   Procedure Laterality Date     SECTION      COLONOSCOPY N/A 2021    COLONOS[facility] performed by Brent [facility] at [facility]  2009    HYSTERECTOMY, TOTAL ABDOMINAL      ROTATOR CUFF REPAIR Left 2017     Outpatient Encounter Medications as of 2021   Medication Sig Dispense Refill    estradiol (ESTRACE VAGINAL) 0.1 MG/GM vaginal cream Place a pea-sized amount vaginally daily x 2 weeks, then 3 times per week thereafter. Use finger to apply, wash hands after application. DO NOT use plastic applicator. Call office if you stop this medication.  42.5 g 3    [] nitrofurantoin, macrocrystal-monohydrate, (MACROBID) 100 MG capsule Take 1 capsule by mouth 2 times daily for 14 days 28 capsule 0    metoprolol succinate (TOPROL XL) numbness. Hematological: Negative for adenopathy. Does not bruise/bleed easily. Psychiatric/Behavioral: Negative for sleep disturbance. /86 (Site: Right Upper Arm, Position: Sitting, Cuff Size: Large Adult)   Pulse 76   Temp 97.1 °F (36.2 °C)   Ht 5' 10\" (1.778 m)   Wt (!) 310 lb (140.6 kg)   BMI 44.48 kg/m²       PHYSICAL EXAM:  Constitutional: Patient resting comfortably, in no acute distress. Neuro: Alert and oriented to person place and time. Psych: Mood and affect normal.  HEENT: normocephalic, atraumatic  Lungs: Respiratory effort normal, unlabored  Abdomen: Soft, non-tender, non-distended  : No CVA tenderness. Pelvic: deferred     Lab Results   Component Value Date    BUN 11 07/29/2021     Lab Results   Component Value Date    CREATININE 0.58 07/29/2021       ASSESSMENT:   Diagnosis Orders   1. Urinary tract infection due to extended-spectrum beta lactamase (ESBL) producing Escherichia coli     2. Frequent UTI  [place] MEASUREMENT,POST-VOID RESIDUAL VOLUME BY US,NON-IMAGING   3. Vaginal atrophy  estradiol (ESTRACE VAGINAL) 0.1 MG/GM vaginal cream         PLAN:  Complete course of antibiotics    Pyridium as needed    Start vaginal estrogen-instructions given (patient was on this years ago prior to her being lost to follow-up.)  She is aware that this can take several months to be effective. Drink upwards of 80 ounces of water a day    Discussed bladder irritants thoroughly. Patient instructed to avoid/minimize intake of food/drinks such as: coffee, tea, caffeine, alcohol, carbonated beverages, dark soda/pop, spicy/acidic foods. Was sent home with a extensive list, including non-irritating alternatives.      Recommended she starts over-the-counter cranberry extract daily    Follow up in 2 months

## 2021-12-01 NOTE — PATIENT INSTRUCTIONS
VAGINAL ESTROGEN    Place a pea-sized amount vaginally daily x 2 weeks, then 3 times per week thereafter. Use finger to apply, wash hands after application. DO NOT use plastic applicator. Call office if you stop this medication. DAILY OVER THE COUNTER CRANBERRY EXTRACT CAPSULE/TABLET    BLADDER IRRITANTS     There are several changes you can make to your diet to help improve your urinary symptoms. The following have been shown to cause irritation to the bladder and should be AVOIDED if possible:  ~ Coffee (including decaffeinated)   ~ ALL Tea (including green teas and decaffeinated)  ~ Soda/Pop/carbonated beverages/Energy drinks (especially dark dyed dragan, root beers, mountain dew, etc)  ~These are MUCH worse if they have caffeine, but can also be irritative to the bladder even without caffeine  ~ Alcoholic beverages  ~ Spicy foods (peppers contain capsaicin, which is very irritating to the bladder)  ~ Acidic foods (for example: tomato based foods, orange juice, etc)    We encourage increased water intake, unless you have been placed on a fluid restriction by another provider.

## 2021-12-10 ENCOUNTER — OFFICE VISIT (OUTPATIENT)
Dept: FAMILY MEDICINE CLINIC | Age: 56
End: 2021-12-10
Payer: COMMERCIAL

## 2021-12-10 VITALS
DIASTOLIC BLOOD PRESSURE: 82 MMHG | WEIGHT: 293 LBS | BODY MASS INDEX: 44.19 KG/M2 | OXYGEN SATURATION: 98 % | HEART RATE: 68 BPM | SYSTOLIC BLOOD PRESSURE: 124 MMHG | TEMPERATURE: 98.2 F

## 2021-12-10 DIAGNOSIS — R09.82 PND (POST-NASAL DRIP): Primary | ICD-10-CM

## 2021-12-10 DIAGNOSIS — R05.9 COUGH IN ADULT: ICD-10-CM

## 2021-12-10 DIAGNOSIS — J06.9 VIRAL URI: ICD-10-CM

## 2021-12-10 PROCEDURE — 99213 OFFICE O/P EST LOW 20 MIN: CPT | Performed by: NURSE PRACTITIONER

## 2021-12-10 ASSESSMENT — ENCOUNTER SYMPTOMS
RHINORRHEA: 0
SORE THROAT: 0
SHORTNESS OF BREATH: 0
DIARRHEA: 0
NAUSEA: 0
COUGH: 1
VOMITING: 0

## 2021-12-10 NOTE — PATIENT INSTRUCTIONS
SURVEY:    You may be receiving a survey from [employer] regarding your visit today. Please complete the survey to enable us to provide the highest quality of care to you and your family. If you cannot score us a very good (5 Stars) on any question, please call the office to discuss how we could have made your experience a very good one. Thank you.     Clinical Care Team: ERNESTO Rodriguez-CARLOS Yang LPN    Clerical Team: Ale Bustosral

## 2021-12-10 NOTE — LETTER
[address] [facility] [address]  Phone: [phone]  Fax: [phone] [place] [address], ERNESTO - CNP        December 10, 2021    [address] [facility]      [facility]:    Flonase 1 spray each nostril twice daily (nasal steroid)  Zyrtec 10mg Daily OR Claritin 10mg Daily (antihistamine)  Hycodan cough syrup 5mL up to 4 times Daily (cough suppressant)  Warm tea with 1tbsp honey (soothes the throat)        If you have any questions or concerns, please don't hesitate to call.     Sincerely,          ERNESTO Quiroga CNP

## 2021-12-10 NOTE — PROGRESS NOTES
She is well-developed. HENT:      Nose: Rhinorrhea present. Mouth/Throat:      Pharynx: Posterior oropharyngeal erythema present. No oropharyngeal exudate. Cardiovascular:      Rate and Rhythm: Normal rate and regular rhythm. Pulmonary:      Effort: Pulmonary effort is normal. No respiratory distress. Breath sounds: Normal breath sounds. Skin:     General: Skin is warm and dry. Neurological:      Mental Status: She is alert and oriented to person, place, and time. Data:     Lab Results   Component Value Date     07/29/2021    K 4.3 07/29/2021     07/29/2021    CO2 22 07/29/2021    BUN 11 07/29/2021    CREATININE 0.58 07/29/2021    GLUCOSE 108 07/29/2021    PROT 6.9 07/29/2021    LABALBU 4.2 07/29/2021    BILITOT 0.58 07/29/2021    ALKPHOS 76 07/29/2021    AST 17 07/29/2021    ALT 12 07/29/2021     Lab Results   Component Value Date    WBC 7.0 09/05/2020    RBC 4.99 09/05/2020    HGB 14.3 09/05/2020    HCT 42.8 09/05/2020    MCV 85.8 09/05/2020    MCH 28.8 09/05/2020    MCHC 33.5 09/05/2020    RDW 13.7 09/05/2020     09/05/2020    MPV NOT REPORTED 09/05/2020     Lab Results   Component Value Date    TSH 1.07 09/05/2020     Lab Results   Component Value Date    CHOL 224 07/29/2021    CHOL 169 02/03/2014    HDL 36 07/29/2021    LABA1C 6.0 07/29/2021          Assessment & Plan        Diagnosis Orders   1. PND (post-nasal drip)  HYDROcodone-homatropine (HYCODAN) 5-1.5 MG/5ML syrup   2. Viral URI  HYDROcodone-homatropine (HYCODAN) 5-1.5 MG/5ML syrup   3. Cough in adult  HYDROcodone-homatropine (HYCODAN) 5-1.5 MG/5ML syrup     Will start on hycodan  Flonase 1 spray each nostril twice daily (nasal steroid)  Zyrtec 10mg Daily OR Claritin 10mg Daily (antihistamine)  Warm tea with 1tbsp honey (soothes the throat)    Patient verbalizes understanding and agreement with plan. All questions answered. If symptoms do not resolve or worsen, return to office. Completed Refills   Requested Prescriptions     Signed Prescriptions Disp Refills    HYDROcodone-homatropine (HYCODAN) 5-1.5 MG/5ML syrup 140 mL 0     Sig: Take 5 mLs by mouth 4 times daily as needed (cough) for up to 7 days. No follow-ups on file. Orders Placed This Encounter   Medications    HYDROcodone-homatropine (HYCODAN) 5-1.5 MG/5ML syrup     Sig: Take 5 mLs by mouth 4 times daily as needed (cough) for up to 7 days. Dispense:  140 mL     Refill:  0     No orders of the defined types were placed in this encounter. Patient Instructions   SURVEY:    You may be receiving a survey from [employer] regarding your visit today. Please complete the survey to enable us to provide the highest quality of care to you and your family. If you cannot score us a very good (5 Stars) on any question, please call the office to discuss how we could have made your experience a very good one. Thank you. Clinical Care Team: DEVIN Powell LPN    Clerical Team: Amandeep Jose        Electronically signed by ERNESTO Powell CNP on 12/10/2021 at 10:26 AM           Completed Refills      Requested Prescriptions     Signed Prescriptions Disp Refills    HYDROcodone-homatropine (HYCODAN) 5-1.5 MG/5ML syrup 140 mL 0     Sig: Take 5 mLs by mouth 4 times daily as needed (cough) for up to 7 days. Carolyn Childs received counseling on the following healthy behaviors: medication adherence  Reviewed prior labs and health maintenance. Continue current medications, diet and exercise. Discussed use, benefit, and side effects of prescribed medications. Barriers to medication compliance addressed. Patient given educational materials - see patient instructions. All patient questions answered. Patient voiced understanding.

## 2022-02-02 ENCOUNTER — OFFICE VISIT (OUTPATIENT)
Dept: UROLOGY | Age: 57
End: 2022-02-02
Payer: COMMERCIAL

## 2022-02-02 VITALS
BODY MASS INDEX: 44.77 KG/M2 | HEART RATE: 76 BPM | SYSTOLIC BLOOD PRESSURE: 132 MMHG | WEIGHT: 293 LBS | DIASTOLIC BLOOD PRESSURE: 82 MMHG

## 2022-02-02 DIAGNOSIS — N95.2 VAGINAL ATROPHY: ICD-10-CM

## 2022-02-02 DIAGNOSIS — R33.9 INCOMPLETE BLADDER EMPTYING: ICD-10-CM

## 2022-02-02 DIAGNOSIS — N39.3 STRESS INCONTINENCE: ICD-10-CM

## 2022-02-02 DIAGNOSIS — N39.0 FREQUENT UTI: Primary | ICD-10-CM

## 2022-02-02 PROCEDURE — 99213 OFFICE O/P EST LOW 20 MIN: CPT | Performed by: PHYSICIAN ASSISTANT

## 2022-02-02 PROCEDURE — 51798 US URINE CAPACITY MEASURE: CPT | Performed by: PHYSICIAN ASSISTANT

## 2022-02-02 RX ORDER — FLUTICASONE PROPIONATE 50 MCG
SPRAY, SUSPENSION (ML) NASAL
Qty: 1 EACH | Refills: 1 | Status: SHIPPED | OUTPATIENT
Start: 2022-02-02 | End: 2022-02-24

## 2022-02-02 ASSESSMENT — ENCOUNTER SYMPTOMS
EYE REDNESS: 0
SHORTNESS OF BREATH: 0
APNEA: 0
COLOR CHANGE: 0
COUGH: 0
BACK PAIN: 0
WHEEZING: 0
VOMITING: 0
ABDOMINAL PAIN: 0
CONSTIPATION: 0
NAUSEA: 0

## 2022-02-02 NOTE — PROGRESS NOTES
HPI:    Patient is a 61 y.o. female in no acute distress. She is alert and oriented to person, place, and time. History  2015 - frequent UTI. Pt has had three in the past few months. Pt did go see Dr. Andrea Luis. She was taking a daily abx and then stopped about three years ago. 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 Pt did have a hysterectomy with oophorectoimy about 5 yrs ago. Pt has not done any hormone replacement therapy. Pt does get about 5 UTI a yr for ten yrs. Pt does leak a little urine when she coughs. Pt has two children born via . Pt is only minimally sexually active. She does report dysparuenia. Patient does not currently have any active symptoms. She does report that her urinary tract infection symptoms to come on very fast.  No gross hematuria or dysuria. Vaginal estrogen    LOST TO FOLLOW UP    2018 Referral from the ER for frequent UTI. She does complain of dysuria, frequency and abdominal pain when she develops a UTI. CT scan completed on the ER. This film was independently reviewed and reviewed with Dr. 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 There is a questionable ectopic ureterocele. The radiologist does feel that this could represent a large nabothian cyst or cystic/necrotic cervical malignancy. Patient did have a total abdominal hysterectomy in  due to endometriosis with Dr. Verenice Araya. It is unclear if this is a radical hysterectomy with complete removal of the cervix. Patient was a previous patient of Dr. Andrea Luis for frequent urinary tract infections.   She denies constipation.     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 Cystoscopy showed fullness on the left side of the floor of the bladder,  no distinct cystocele seen. 12/10/2018 last seen    LOST TO FOLLOW UP    2021 - recurrent urinary tract infection returned to reestablish care    2021 - cranberry and vaginal estrogen started     Patient is uncertain what her reaction to sulfa and Bactrim is, she states this was as a child    Today  Patient is here today for follow-up recurrent urinary tract infection, vaginal atrophy. At last visit we started patient on vaginal estrogen and instructed her to start taking over-the-counter cranberry extract tablets. She is using both of these. We also recommended that she increase her water intake from 65 to approximately 80 ounces a day. She is drinking at least 60 ounces of water a day. She denies any fever, chills, gross hematuria, flank pain, dysuria. She did void 70 mL and had a PVR of 148 mL. This is higher than last time she was in. She does feel as though she is completely emptied. She is having daily bowel move which are soft and easy to pass.       Past Medical History:   Diagnosis Date    Bell's palsy 2013    Carpal tunnel syndrome     Chicken pox     Hydronephrosis 2009    left     Hyperlipidemia 2013    Pneumonia     Renal atrophy     UTI (urinary tract infection)      Past Surgical History:   Procedure Laterality Date     SECTION      COLONOSCOPY N/A 2021    COL[facility]ju [facility]  2009    HYSTERECTOMY, TOTAL ABDOMINAL  2010    ROTATOR CUFF REPAIR Left 2017     Outpatient Encounter Medications as of 2022   Medication Sig Dispense Refill    fluticasone (FLONASE) 50 MCG/ACT nasal spray USE 2 SPRAYS IN EACH NOSTRIL EVERYDAY 1 each 1    CRANBERRY CONCENTRATE PO Take by mouth      estradiol (ESTRACE VAGINAL) 0.1 MG/GM vaginal cream Place a pea-sized amount vaginally daily x 2 weeks, then 3 times per week thereafter. Use finger to apply, wash hands after application. DO NOT use plastic applicator. Call office if you stop this medication. 42.5 g 3    metoprolol succinate (TOPROL XL) 25 MG extended release tablet TAKE 1 TABLET BY MOUTH EVERY DAY 30 tablet 5    phenazopyridine (PYRIDIUM) 100 MG tablet Take 1 tablet by mouth 3 times daily as needed for Pain (Patient not taking: Reported on 2/2/2022) 20 tablet 0     No facility-administered encounter medications on file as of 2/2/2022. Current Outpatient Medications on File Prior to Visit   Medication Sig Dispense Refill    CRANBERRY CONCENTRATE PO Take by mouth      estradiol (ESTRACE VAGINAL) 0.1 MG/GM vaginal cream Place a pea-sized amount vaginally daily x 2 weeks, then 3 times per week thereafter. Use finger to apply, wash hands after application. DO NOT use plastic applicator. Call office if you stop this medication. 42.5 g 3    metoprolol succinate (TOPROL XL) 25 MG extended release tablet TAKE 1 TABLET BY MOUTH EVERY DAY 30 tablet 5    phenazopyridine (PYRIDIUM) 100 MG tablet Take 1 tablet by mouth 3 times daily as needed for Pain (Patient not taking: Reported on 2/2/2022) 20 tablet 0     No current facility-administered medications on file prior to visit. Sulfa antibiotics  Family History   Problem Relation Age of Onset    Cancer Mother         breast    Heart Disease Mother     Stroke Mother     Cancer Father      Social History     Tobacco Use   Smoking Status Never Smoker   Smokeless Tobacco Never Used       Social History     Substance and Sexual Activity   Alcohol Use No    Alcohol/week: 0.0 standard drinks       Review of Systems   Constitutional: Negative for appetite change, chills and fever. Eyes: Negative for redness and visual disturbance. Respiratory: Negative for apnea, cough, shortness of breath and wheezing. Cardiovascular: Negative for chest pain and leg swelling.    Gastrointestinal: Negative for abdominal pain, constipation, nausea and vomiting. Genitourinary: Negative for difficulty urinating, dyspareunia, dysuria, enuresis, flank pain, frequency, hematuria, pelvic pain, urgency, vaginal bleeding and vaginal discharge. Musculoskeletal: Negative for back pain, joint swelling and myalgias. Skin: Negative for color change, rash and wound. Neurological: Negative for dizziness, tremors and numbness. Hematological: Negative for adenopathy. Does not bruise/bleed easily. Psychiatric/Behavioral: Negative for sleep disturbance. /82 (Site: Right Upper Arm, Position: Sitting, Cuff Size: Medium Adult)   Pulse 76   Wt (!) 312 lb (141.5 kg)   BMI 44.77 kg/m²       PHYSICAL EXAM:  Constitutional: Patient resting comfortably, in no acute distress. Neuro: Alert and oriented to person place and time. Psych: Mood and affect normal.  HEENT: normocephalic, atraumatic  Lungs: Respiratory effort normal, unlabored  Abdomen: Soft, non-tender, non-distended  : No CVA tenderness. Pelvic: deferred     Lab Results   Component Value Date    BUN 11 07/29/2021     Lab Results   Component Value Date    CREATININE 0.58 07/29/2021       ASSESSMENT:   Diagnosis Orders   1. Frequent UTI     2. Vaginal atrophy     3. Stress incontinence  [place] MEASUREMENT,POST-VOID RESIDUAL VOLUME BY US,NON-IMAGING   4. Incomplete bladder emptying  Basic Metabolic Panel         PLAN:  Continue vaginal estrogen. She is aware that this can take upwards of 6 months to be fully effective    Continue to drink upwards of 80 ounces of water a day    Discussed bladder irritants thoroughly. Patient instructed to avoid/minimize intake of food/drinks such as: coffee, tea, caffeine, alcohol, carbonated beverages, dark soda/pop, spicy/acidic foods. Was sent home with a extensive list, including non-irritating alternatives.      Continue cranberry extract tablets    Encouraged double voiding    Follow-up in 3 months with PVR and BMP

## 2022-02-02 NOTE — TELEPHONE ENCOUNTER
Last visit:  12/10/2021  Next Visit Date:    Future Appointments   Date Time Provider Maykel Iqbal   2/2/2022  8:45 AM Radha Chavez PA-C [place] [facility]         Medication List:  Prior to Admission medications    Medication Sig Start Date End Date Taking? Authorizing Provider   estradiol (ESTRACE VAGINAL) 0.1 MG/GM vaginal cream Place a pea-sized amount vaginally daily x 2 weeks, then 3 times per week thereafter. Use finger to apply, wash hands after application. DO NOT use plastic applicator. Call office if you stop this medication.  12/1/21   Radha Chavez PA-C   phenazopyridine (PYRIDIUM) 100 MG tablet Take 1 tablet by mouth 3 times daily as needed for Pain  Patient not taking: Reported on 12/1/2021 11/16/21 11/16/22  Alan Chavez PA-C   metoprolol succinate (TOPROL XL) 25 MG extended release tablet TAKE 1 TABLET BY MOUTH EVERY DAY 10/14/21   Agnieszka Phan MD   fluticasone [facility]) 50 MCG/ACT nasal spray USE 2 SPRAYS IN EACH NOSTRIL EVERYDAY 8/4/21   Agnieszka Phan MD

## 2022-02-24 RX ORDER — FLUTICASONE PROPIONATE 50 MCG
SPRAY, SUSPENSION (ML) NASAL
Qty: 1 EACH | Refills: 1 | Status: SHIPPED | OUTPATIENT
Start: 2022-02-24 | End: 2022-03-23

## 2022-02-24 NOTE — TELEPHONE ENCOUNTER
Last visit:  12/10/2021  Next Visit Date:    Future Appointments   Date Time Provider Maykel Iqbal   5/4/2022  9:00 AM Miguelito Chavez PA-C [place] [facility]         Medication List:  Prior to Admission medications    Medication Sig Start Date End Date Taking? Authorizing Provider   fluticasone (FLONASE) 50 MCG/ACT nasal spray USE 2 SPRAYS IN EACH NOSTRIL EVERYDAY 2/2/22   Tristan Hernandez MD   CRANBERRY CONCENTRATE PO Take by mouth    Historical Provider, MD   estradiol (ESTRACE VAGINAL) 0.1 MG/GM vaginal cream Place a pea-sized amount vaginally daily x 2 weeks, then 3 times per week thereafter. Use finger to apply, wash hands after application. DO NOT use plastic applicator. Call office if you stop this medication.  12/1/21   Miguelito Chavez PA-C   phenazopyridine (PYRIDIUM) 100 MG tablet Take 1 tablet by mouth 3 times daily as needed for Pain  Patient not taking: Reported on 2/2/2022 11/16/21 11/16/22  Kaia Chavez PA-C   metoprolol succinate (TOPROL XL) 25 MG extended release tablet TAKE 1 TABLET BY MOUTH EVERY DAY 10/14/21   Tristan Hernandez MD

## 2022-03-23 RX ORDER — FLUTICASONE PROPIONATE 50 MCG
SPRAY, SUSPENSION (ML) NASAL
Qty: 1 EACH | Refills: 2 | Status: SHIPPED | OUTPATIENT
Start: 2022-03-23 | End: 2022-09-14

## 2022-03-23 NOTE — TELEPHONE ENCOUNTER
Last OV: 12/10/2021PND   Last RX:   Next scheduled apt: Visit date not found        [facility] request        RX pending

## 2022-04-11 RX ORDER — METOPROLOL SUCCINATE 25 MG/1
TABLET, EXTENDED RELEASE ORAL
Qty: 30 TABLET | Refills: 2 | Status: SHIPPED | OUTPATIENT
Start: 2022-04-11 | End: 2022-07-11

## 2022-04-11 NOTE — TELEPHONE ENCOUNTER
Remind pt that Rx sent for 30 and 2 refills to get her until July. Then pt will need her yearly check up in July if would like to schedule.

## 2022-04-11 NOTE — TELEPHONE ENCOUNTER
Last OV 7/29/21 for routine health maintenance  Last OV 10/29/21 for acute cystitis   Requesting metoprolol thru [facility]

## 2022-07-11 RX ORDER — METOPROLOL SUCCINATE 25 MG/1
TABLET, EXTENDED RELEASE ORAL
Qty: 90 TABLET | Refills: 1 | Status: SHIPPED | OUTPATIENT
Start: 2022-07-11 | End: 2022-07-29

## 2022-07-11 NOTE — TELEPHONE ENCOUNTER
Last OV: 12/10/2021 URI  Last RX:     Next scheduled apt: 7/13/2022 routine         surescript requesting a refill

## 2022-07-13 ENCOUNTER — OFFICE VISIT (OUTPATIENT)
Dept: FAMILY MEDICINE CLINIC | Age: 57
End: 2022-07-13
Payer: COMMERCIAL

## 2022-07-13 ENCOUNTER — HOSPITAL ENCOUNTER (OUTPATIENT)
Dept: NON INVASIVE DIAGNOSTICS | Age: 57
Discharge: HOME | End: 2022-07-13
Payer: COMMERCIAL

## 2022-07-13 VITALS
WEIGHT: 293 LBS | SYSTOLIC BLOOD PRESSURE: 134 MMHG | HEIGHT: 70 IN | OXYGEN SATURATION: 96 % | HEART RATE: 60 BPM | DIASTOLIC BLOOD PRESSURE: 84 MMHG | BODY MASS INDEX: 41.95 KG/M2

## 2022-07-13 DIAGNOSIS — R20.0 NUMBNESS AND TINGLING IN RIGHT HAND: ICD-10-CM

## 2022-07-13 DIAGNOSIS — R00.0 TACHYCARDIA: ICD-10-CM

## 2022-07-13 DIAGNOSIS — Z00.00 ANNUAL PHYSICAL EXAM: Primary | ICD-10-CM

## 2022-07-13 DIAGNOSIS — G47.33 OSA (OBSTRUCTIVE SLEEP APNEA): ICD-10-CM

## 2022-07-13 DIAGNOSIS — R20.2 NUMBNESS AND TINGLING IN RIGHT HAND: ICD-10-CM

## 2022-07-13 DIAGNOSIS — I10 PRIMARY HYPERTENSION: ICD-10-CM

## 2022-07-13 DIAGNOSIS — Z12.31 OTHER SCREENING MAMMOGRAM: ICD-10-CM

## 2022-07-13 PROCEDURE — 93270 REMOTE 30 DAY ECG REV/REPORT: CPT

## 2022-07-13 PROCEDURE — 99396 PREV VISIT EST AGE 40-64: CPT | Performed by: FAMILY MEDICINE

## 2022-07-13 ASSESSMENT — ENCOUNTER SYMPTOMS
CONSTIPATION: 0
BLOOD IN STOOL: 0
EYE REDNESS: 0
COUGH: 0
VOMITING: 0
NAUSEA: 0
SHORTNESS OF BREATH: 1
EYE DISCHARGE: 0
DIARRHEA: 0
ABDOMINAL PAIN: 0

## 2022-07-13 ASSESSMENT — PATIENT HEALTH QUESTIONNAIRE - PHQ9
SUM OF ALL RESPONSES TO PHQ QUESTIONS 1-9: 0
SUM OF ALL RESPONSES TO PHQ9 QUESTIONS 1 & 2: 0
SUM OF ALL RESPONSES TO PHQ QUESTIONS 1-9: 0
2. FEELING DOWN, DEPRESSED OR HOPELESS: 0
1. LITTLE INTEREST OR PLEASURE IN DOING THINGS: 0
SUM OF ALL RESPONSES TO PHQ QUESTIONS 1-9: 0
SUM OF ALL RESPONSES TO PHQ QUESTIONS 1-9: 0

## 2022-07-13 NOTE — PROGRESS NOTES
HPI Notes    Name: Bernardino Alegre  : 1965        Chief Complaint:     Chief Complaint   Patient presents with    Hypertension     Wellness exam. Last labs 21    Sleep Apnea     cpap    Numbness     right hand numbness and tingling    Tachycardia     episodes of tachycardia with sob       History of Present Illness:     Bernardino Alegre is a 61 y.o.  female who presents with Hypertension (Wellness exam. Last labs 21), Sleep Apnea (cpap), Numbness (right hand numbness and tingling), and Tachycardia (episodes of tachycardia with sob)    Annual exam - pt is here for her annual exam and physical and reviewed medical hx    Hypertension  This is a chronic problem. The current episode started more than 1 year ago. The problem is unchanged. The problem is controlled. Associated symptoms include palpitations and shortness of breath. Pertinent negatives include no chest pain, malaise/fatigue, neck pain or peripheral edema. There are no associated agents to hypertension. Risk factors for coronary artery disease include post-menopausal state and obesity. The current treatment provides significant improvement. Sleep apnea - chronic and stable and pt wears her CPAP every night. Pt can tell a difference if she didn't wear her CPAP -- feels better and sleeps better with CPAP nightly. Rt hand numbness - pt has gradual Rt hand numbness and getting worse. Pt has numbness at the thumb and into first 2-3 fingers. NO weakness. Pt can have the numbness at rest as it \"keeps her awake at night. Tachycardia - Pt had some off and on palpitations over the years but this summer alone she has had THREE of these episodes of palpitations --- feels like heart pounding in neck but NO chest pain. Pt feels some SOB when heart is racing. Pt also has some dizziness when this occurs. Pt states this occurs when she is just sitting. Pt canerded as her sister has Afib No increase stress.      Past Medical History:     Past Medical History:   Diagnosis Date    Bell's palsy 2013    Carpal tunnel syndrome     Chicken pox     Hydronephrosis 2009    left     Hyperlipidemia 2013    Pneumonia     Renal atrophy     UTI (urinary tract infection)       Reviewed all health maintenance requirements and ordered appropriate tests  Health Maintenance Due   Topic Date Due    HIV screen  Never done    DTaP/Tdap/Td vaccine (1 - Tdap) Never done    Shingles vaccine (1 of 2) Never done    COVID-19 Vaccine (3 - Booster for Moderna series) 2021    A1C test (Diabetic or Prediabetic)  2022    Depression Screen  2022       Past Surgical History:     Past Surgical History:   Procedure Laterality Date     SECTION      COLONOSCOPY N/A 2021    COLONOSCOPY performed by Job Castillo MD at [address] [facility]      HYSTERECTOMY, TOTAL ABDOMINAL (CERVIX REMOVED)      ROTATOR CUFF REPAIR Left 2017        Medications:       Prior to Admission medications    Medication Sig Start Date End Date Taking? Authorizing Provider   metoprolol succinate (TOPROL XL) 25 MG extended release tablet TAKE 1 TABLET BY MOUTH EVERY DAY 22  Yes Laith Kam MD   fluticasone [facility]) 50 MCG/ACT nasal spray USE 2 SPRAYS IN EACH NOSTRIL EVERYDAY 3/23/22  Yes Laith Kam MD   CRANBERRY CONCENTRATE PO Take by mouth   Yes Historical Provider, MD   estradiol (ESTRACE VAGINAL) 0.1 MG/GM vaginal cream Place a pea-sized amount vaginally daily x 2 weeks, then 3 times per week thereafter. Use finger to apply, wash hands after application. DO NOT use plastic applicator. Call office if you stop this medication. 21   Linda Chavez PA-C        Allergies:       Sulfa antibiotics    Social History:     Tobacco:    reports that she has never smoked. She has never used smokeless tobacco.  Alcohol:      reports no history of alcohol use. Drug Use:  reports no history of drug use.     Family History:     Family History Problem Relation Age of Onset    Cancer Mother         breast    Heart Disease Mother     Stroke Mother     Cancer Father        Review of Systems:       Review of Systems   Constitutional: Negative for chills, fatigue, fever, malaise/fatigue and unexpected weight change. Eyes: Negative for discharge, redness and visual disturbance. Respiratory: Positive for shortness of breath. Negative for cough. Cardiovascular: Positive for palpitations. Negative for chest pain. Gastrointestinal: Negative for abdominal pain, blood in stool, constipation, diarrhea, nausea and vomiting. Genitourinary: Negative for dysuria and hematuria. Musculoskeletal: Negative for joint swelling and neck pain. Skin: Negative for rash. Neurological: Positive for numbness. Negative for dizziness and facial asymmetry. Rt arm numbness   Psychiatric/Behavioral: Negative for sleep disturbance. Physical Exam:     Physical Exam  Vitals reviewed. Constitutional:       General: She is not in acute distress. Appearance: Normal appearance. She is well-developed. She is not ill-appearing. HENT:      Head: Normocephalic and atraumatic. Eyes:      General:         Right eye: No discharge. Left eye: No discharge. Conjunctiva/sclera: Conjunctivae normal.   Neck:      Thyroid: No thyromegaly. Vascular: No carotid bruit. Cardiovascular:      Rate and Rhythm: Normal rate and regular rhythm. Heart sounds: Normal heart sounds. No murmur heard. Pulmonary:      Effort: Pulmonary effort is normal. No respiratory distress. Breath sounds: Normal breath sounds. Abdominal:      Palpations: Abdomen is soft. Tenderness: There is no abdominal tenderness. Musculoskeletal:      Cervical back: Neck supple. Right lower leg: No edema. Left lower leg: No edema. Lymphadenopathy:      Cervical: No cervical adenopathy. Skin:     Findings: No erythema or rash.    Neurological:

## 2022-07-13 NOTE — PATIENT INSTRUCTIONS
Survey: You may be receiving a survey from [employer] regarding your visit today. You may get this in the mail, through your MyChart or in your email. Please complete the survey to enable us to provide the highest quality of care to you and your family. Please also, mention our names. If you cannot score us as very good (5 Stars) on any question, please feel free to call the office to discuss how we could have made your experience exceptional.      Thank You!         MD Franny Mckeon LPN

## 2022-07-27 ENCOUNTER — HOSPITAL ENCOUNTER (OUTPATIENT)
Age: 57
Discharge: HOME | End: 2022-07-27
Payer: COMMERCIAL

## 2022-07-27 DIAGNOSIS — R20.0 NUMBNESS AND TINGLING IN RIGHT HAND: ICD-10-CM

## 2022-07-27 DIAGNOSIS — I10 PRIMARY HYPERTENSION: ICD-10-CM

## 2022-07-27 DIAGNOSIS — R20.2 NUMBNESS AND TINGLING IN RIGHT HAND: ICD-10-CM

## 2022-07-27 DIAGNOSIS — R00.0 TACHYCARDIA: ICD-10-CM

## 2022-07-27 LAB
ALBUMIN SERPL-MCNC: 4.5 G/DL (ref 3.5–5.2)
ALP BLD-CCNC: 87 U/L (ref 35–104)
ALT SERPL-CCNC: 31 U/L (ref 5–33)
ANION GAP SERPL CALCULATED.3IONS-SCNC: 12 MMOL/L (ref 9–17)
AST SERPL-CCNC: 17 U/L
BILIRUB SERPL-MCNC: 0.68 MG/DL (ref 0.3–1.2)
BUN BLDV-MCNC: 16 MG/DL (ref 6–20)
BUN/CREAT BLD: 26 (ref 9–20)
CALCIUM SERPL-MCNC: 9.8 MG/DL (ref 8.6–10.4)
CHLORIDE BLD-SCNC: 103 MMOL/L (ref 98–107)
CHOLESTEROL/HDL RATIO: 6.9
CHOLESTEROL: 240 MG/DL
CO2: 24 MMOL/L (ref 20–31)
CREAT SERPL-MCNC: 0.62 MG/DL (ref 0.5–0.9)
GFR AFRICAN AMERICAN: >60 ML/MIN
GFR NON-AFRICAN AMERICAN: >60 ML/MIN
GFR SERPL CREATININE-BSD FRML MDRD: ABNORMAL ML/MIN/{1.73_M2}
GLUCOSE BLD-MCNC: 101 MG/DL (ref 70–99)
HDLC SERPL-MCNC: 35 MG/DL
LDL CHOLESTEROL: 154 MG/DL (ref 0–130)
PATIENT FASTING?: YES
POTASSIUM SERPL-SCNC: 4.6 MMOL/L (ref 3.7–5.3)
SODIUM BLD-SCNC: 139 MMOL/L (ref 135–144)
TOTAL PROTEIN: 7.2 G/DL (ref 6.4–8.3)
TRIGL SERPL-MCNC: 256 MG/DL
TSH SERPL DL<=0.05 MIU/L-ACNC: 1.1 UIU/ML (ref 0.3–5)

## 2022-07-27 PROCEDURE — 84443 ASSAY THYROID STIM HORMONE: CPT

## 2022-07-27 PROCEDURE — 80061 LIPID PANEL: CPT

## 2022-07-27 PROCEDURE — 80053 COMPREHEN METABOLIC PANEL: CPT

## 2022-07-27 PROCEDURE — 36415 COLL VENOUS BLD VENIPUNCTURE: CPT

## 2022-07-29 ENCOUNTER — HOSPITAL ENCOUNTER (EMERGENCY)
Age: 57
Discharge: HOME | End: 2022-07-29
Attending: EMERGENCY MEDICINE
Payer: COMMERCIAL

## 2022-07-29 VITALS
TEMPERATURE: 98 F | OXYGEN SATURATION: 97 % | BODY MASS INDEX: 41.95 KG/M2 | HEIGHT: 70 IN | RESPIRATION RATE: 19 BRPM | DIASTOLIC BLOOD PRESSURE: 74 MMHG | HEART RATE: 77 BPM | SYSTOLIC BLOOD PRESSURE: 119 MMHG | WEIGHT: 293 LBS

## 2022-07-29 DIAGNOSIS — I47.1 PAROXYSMAL SUPRAVENTRICULAR TACHYCARDIA (HCC): Primary | ICD-10-CM

## 2022-07-29 LAB
ABSOLUTE EOS #: 0.2 K/UL (ref 0–0.4)
ABSOLUTE LYMPH #: 3.2 K/UL (ref 1–4.8)
ABSOLUTE MONO #: 0.8 K/UL (ref 0–1)
ALBUMIN SERPL-MCNC: 4.6 G/DL (ref 3.5–5.2)
ALP BLD-CCNC: 94 U/L (ref 35–104)
ALT SERPL-CCNC: 31 U/L (ref 5–33)
ANION GAP SERPL CALCULATED.3IONS-SCNC: 12 MMOL/L (ref 9–17)
AST SERPL-CCNC: 20 U/L
BASOPHILS # BLD: 1 % (ref 0–2)
BASOPHILS ABSOLUTE: 0.2 K/UL (ref 0–0.2)
BILIRUB SERPL-MCNC: 0.74 MG/DL (ref 0.3–1.2)
BUN BLDV-MCNC: 15 MG/DL (ref 6–20)
BUN/CREAT BLD: 16 (ref 9–20)
CALCIUM SERPL-MCNC: 10.2 MG/DL (ref 8.6–10.4)
CHLORIDE BLD-SCNC: 104 MMOL/L (ref 98–107)
CO2: 22 MMOL/L (ref 20–31)
CREAT SERPL-MCNC: 0.92 MG/DL (ref 0.5–0.9)
DIFFERENTIAL TYPE: YES
EOSINOPHILS RELATIVE PERCENT: 2 % (ref 0–5)
GFR AFRICAN AMERICAN: >60 ML/MIN
GFR NON-AFRICAN AMERICAN: >60 ML/MIN
GFR SERPL CREATININE-BSD FRML MDRD: ABNORMAL ML/MIN/{1.73_M2}
GLUCOSE BLD-MCNC: 131 MG/DL (ref 70–99)
HCT VFR BLD CALC: 43.8 % (ref 36–46)
HEMOGLOBIN: 14.6 G/DL (ref 12–16)
LYMPHOCYTES # BLD: 28 % (ref 15–40)
MCH RBC QN AUTO: 29 PG (ref 26–34)
MCHC RBC AUTO-ENTMCNC: 33.4 G/DL (ref 31–37)
MCV RBC AUTO: 86.9 FL (ref 80–100)
MONOCYTES # BLD: 7 % (ref 4–8)
PDW BLD-RTO: 14.1 % (ref 12.1–15.2)
PLATELET # BLD: 424 K/UL (ref 140–450)
POTASSIUM SERPL-SCNC: 4.3 MMOL/L (ref 3.7–5.3)
RBC # BLD: 5.03 M/UL (ref 4–5.2)
SEG NEUTROPHILS: 62 % (ref 47–75)
SEGMENTED NEUTROPHILS ABSOLUTE COUNT: 7.2 K/UL (ref 2.5–7)
SODIUM BLD-SCNC: 138 MMOL/L (ref 135–144)
TOTAL PROTEIN: 7.2 G/DL (ref 6.4–8.3)
TROPONIN, HIGH SENSITIVITY: 8 NG/L (ref 0–14)
TSH SERPL DL<=0.05 MIU/L-ACNC: 2.05 UIU/ML (ref 0.3–5)
WBC # BLD: 11.5 K/UL (ref 3.5–11)

## 2022-07-29 PROCEDURE — 93005 ELECTROCARDIOGRAM TRACING: CPT | Performed by: EMERGENCY MEDICINE

## 2022-07-29 PROCEDURE — 99284 EMERGENCY DEPT VISIT MOD MDM: CPT

## 2022-07-29 PROCEDURE — 84484 ASSAY OF TROPONIN QUANT: CPT

## 2022-07-29 PROCEDURE — 85025 COMPLETE CBC W/AUTO DIFF WBC: CPT

## 2022-07-29 PROCEDURE — 80053 COMPREHEN METABOLIC PANEL: CPT

## 2022-07-29 PROCEDURE — 84443 ASSAY THYROID STIM HORMONE: CPT

## 2022-07-29 RX ORDER — ATORVASTATIN CALCIUM 20 MG/1
20 TABLET, FILM COATED ORAL DAILY
Qty: 30 TABLET | Refills: 5 | Status: SHIPPED | OUTPATIENT
Start: 2022-07-29 | End: 2022-09-01 | Stop reason: SINTOL

## 2022-07-29 RX ORDER — METOPROLOL SUCCINATE 25 MG/1
50 TABLET, EXTENDED RELEASE ORAL DAILY
Qty: 60 TABLET | Refills: 1 | Status: SHIPPED | OUTPATIENT
Start: 2022-07-29 | End: 2022-08-02 | Stop reason: ALTCHOICE

## 2022-07-29 NOTE — ED PROVIDER NOTES
[address] COMPLAINT    Chief Complaint   Patient presents with    Shortness of Breath    Dizziness     States has been SOB, dizzy, and right arm feels numb for about 90 minutes - has a 30 day event monitor on at this time        WALDO Pedro is a 61 y.o. female who presentsto ED from home. By car. With complaint of potation's, rapid heart rate. Patient has history of SVT. Patient states that she had 4 episodes since the beginning of the summer. Patient is on metoprolol. Onset prior to arrival.  Patient had rapid heart rate at home patient's heart rate was 174 at home. She presents with palpitations no shortness of breath  Denies chest pain denies shortness of breath. Patient denies fever chills or upper respiratory symptoms. No COVID symptoms. PAST MEDICAL HISTORY    Past Medical History:   Diagnosis Date    Bell's palsy 2013    Carpal tunnel syndrome     Chicken pox     Hydronephrosis     left     Hyperlipidemia 2013    Pneumonia     Renal atrophy     UTI (urinary tract infection)        SURGICAL HISTORY    Past Surgical History:   Procedure Laterality Date     SECTION      COLONOSCOPY N/A 2021    COLONOSCOPY performed by Braulio Jasso MD at [facility]      HYSTERECTOMY, TOTAL ABDOMINAL (CERVIX REMOVED)  2010    ROTATOR CUFF REPAIR Left 2017       CURRENT MEDICATIONS    Current Outpatient Rx   Medication Sig Dispense Refill    metoprolol succinate (TOPROL XL) 25 MG extended release tablet Take 2 tablets by mouth in the morning.  60 tablet 1    fluticasone (FLONASE) 50 MCG/ACT nasal spray USE 2 SPRAYS IN EACH NOSTRIL EVERYDAY 1 each 2    CRANBERRY CONCENTRATE PO Take by mouth         ALLERGIES    Allergies   Allergen Reactions    Sulfa Antibiotics Other (See Comments)     Patient says she was told she had a reaction when she was a child but does not know what the reaction was       FAMILY HISTORY    Family History   Problem Relation Age of Onset    Cancer Mother         breast    Heart Disease Mother     Stroke Mother     Cancer Father        SOCIAL HISTORY    Social History     Socioeconomic History    Marital status:      Spouse name: None    Number of children: None    Years of education: None    Highest education level: None   Tobacco Use    Smoking status: Never    Smokeless tobacco: Never   Vaping Use    Vaping Use: Never used   Substance and Sexual Activity    Alcohol use: No     Alcohol/week: 0.0 standard drinks    Drug use: No     Social Determinants of Health     Financial Resource Strain: Low Risk     Difficulty of Paying Living Expenses: Not hard at all   Food Insecurity: No Food Insecurity    Worried About Running Out of Food in the Last Year: Never true    Ran Out of Food in the Last Year: Never true           Review of Systems:  Constitutional:  Denies fever, chills, weight loss or weakness   Eyes:  Denies photophobia or discharge   HENT:  Denies sore throat or ear pain   Respiratory:  Denies cough or shortness of breath   Cardiovascular: Palpitations   GI:  Denies abdominal pain, nausea, vomiting, or diarrhea   Musculoskeletal:  Denies back pain   Skin:  Denies rash   Neurologic:  Denies headache, focal weakness or sensory changes   Endocrine:  Denies polyuria or polydypsia   Lymphatic:  Denies swollen glands   Psychiatric:  Denies depression, suicidal ideation or homicidal ideation   All systems negative except as marked. PHYSICAL EXAM    VITAL SIGNS: /87   Pulse (!) 160   Temp 98 °F (36.7 °C) (Oral)   Resp 24   Ht 5' 10\" (1.778 m)   Wt (!) 309 lb (140.2 kg)   SpO2 99%   BMI 44.34 kg/m²    Constitutional: Ill-appearing SVT on arrival HENT:  Normocephalic, Atraumatic, Bilateral external ears normal, Oropharynx moist, No oral exudates, Nose normal. Neck- Normal range of motion, No tenderness, Supple, No stridor. Eyes:  PERRL, EOMI, Conjunctiva normal, No discharge. Respiratory:  Normal breath sounds, No respiratory distress, No wheezing, No chest tenderness. Cardiovascular: Supraventricular tachycardia  GI:  Bowel sounds normal, Soft, No tenderness, No masses, No pulsatile masses. : External genitalia appear normal, No masses or lesions. No discharge. No CVA tenderness. Musculoskeletal:  Intact distal pulses, No edema, No tenderness, No cyanosis, No clubbing. Good range of motion in all major joints. No tenderness to palpation or major deformities noted. Back- No tenderness. Integument:  Warm, Dry, No erythema, No rash. Lymphatic:  No lymphadenopathy noted. Neurologic:  Alert & oriented x 3, Normal motor function, Normal sensory function, No focal deficits noted. Psychiatric:  Affect normal, Judgment normal, Mood normal.     EKG    SVT cardioversion. Was placed on Trendelenburg position carotid massage on the carotid. Patient successfully converted to sinus rhythm rate of 80    RADIOLOGY    No orders to display       PROCEDURES    SVT cardioversion with Valsalva    Labs  Labs Reviewed   COMPREHENSIVE METABOLIC PANEL - Abnormal; Notable for the following components:       Result Value    Glucose 131 (*)     Creatinine 0.92 (*)     All other components within normal limits   CBC WITH AUTO DIFFERENTIAL - Abnormal; Notable for the following components:    WBC 11.5 (*)     Segs Absolute 7.20 (*)     All other components within normal limits   TROPONIN   TSH             Summation      Patient Course: Patient presents with SVT patient was cardioverted with vasovagal maneuvers. ED Medications administered this visit:  Medications - No data to display    New Prescriptions from this visit:    Current Discharge Medication List          Follow-up:  MD Lizz Bro [phone]    Schedule an appointment as soon as possible for a visit           Final Impression:   1.  Paroxysmal supraventricular tachycardia ([address] (Please note that portions of this note were completed with a voice recognition program.  Efforts were made to edit the dictations but occasionally words are mis-transcribed.)          Pily Iraheta MD  07/29/22 [id number]       Pily Iraheta MD  07/29/22 1432

## 2022-07-29 NOTE — TELEPHONE ENCOUNTER
Actually tell pt I did send the Lipitor to start it but she could still make an ER f/u appt with me.  thanks

## 2022-07-30 LAB
EKG ATRIAL RATE: 88 BPM
EKG P AXIS: 59 DEGREES
EKG P-R INTERVAL: 160 MS
EKG Q-T INTERVAL: 350 MS
EKG QRS DURATION: 76 MS
EKG QTC CALCULATION (BAZETT): 423 MS
EKG R AXIS: 17 DEGREES
EKG T AXIS: 42 DEGREES
EKG VENTRICULAR RATE: 88 BPM

## 2022-07-30 PROCEDURE — 93010 ELECTROCARDIOGRAM REPORT: CPT | Performed by: INTERNAL MEDICINE

## 2022-07-31 LAB
EKG ATRIAL RATE: 166 BPM
EKG Q-T INTERVAL: 288 MS
EKG QRS DURATION: 104 MS
EKG QTC CALCULATION (BAZETT): 465 MS
EKG R AXIS: 8 DEGREES
EKG T AXIS: 43 DEGREES
EKG VENTRICULAR RATE: 157 BPM

## 2022-07-31 PROCEDURE — 93010 ELECTROCARDIOGRAM REPORT: CPT | Performed by: INTERNAL MEDICINE

## 2022-08-02 ENCOUNTER — TELEPHONE (OUTPATIENT)
Dept: CARDIOLOGY CLINIC | Age: 57
End: 2022-08-02

## 2022-08-02 DIAGNOSIS — R06.02 SOB (SHORTNESS OF BREATH): Primary | ICD-10-CM

## 2022-08-02 DIAGNOSIS — I47.1 SVT (SUPRAVENTRICULAR TACHYCARDIA) (HCC): ICD-10-CM

## 2022-08-02 DIAGNOSIS — R07.9 CHEST PAIN, UNSPECIFIED TYPE: ICD-10-CM

## 2022-08-02 RX ORDER — DILTIAZEM HYDROCHLORIDE 120 MG/1
120 CAPSULE, COATED, EXTENDED RELEASE ORAL DAILY
Qty: 30 CAPSULE | Refills: 11 | Status: SHIPPED | OUTPATIENT
Start: 2022-08-02

## 2022-08-02 NOTE — TELEPHONE ENCOUNTER
Pt called after being seen in ED for SVT and on EVent monitor she is wearing   Will stop toprol and start diltiazem 120 mg daily   Will do stress test and echo per pt will do here in [place].    Appt made for follow up

## 2022-08-04 ENCOUNTER — OFFICE VISIT (OUTPATIENT)
Dept: FAMILY MEDICINE CLINIC | Age: 57
End: 2022-08-04
Payer: COMMERCIAL

## 2022-08-04 VITALS
WEIGHT: 293 LBS | SYSTOLIC BLOOD PRESSURE: 136 MMHG | DIASTOLIC BLOOD PRESSURE: 80 MMHG | HEART RATE: 76 BPM | OXYGEN SATURATION: 98 % | BODY MASS INDEX: 44.77 KG/M2

## 2022-08-04 DIAGNOSIS — I47.1 SVT (SUPRAVENTRICULAR TACHYCARDIA) (HCC): Primary | ICD-10-CM

## 2022-08-04 PROCEDURE — 99213 OFFICE O/P EST LOW 20 MIN: CPT | Performed by: FAMILY MEDICINE

## 2022-08-04 SDOH — ECONOMIC STABILITY: FOOD INSECURITY: WITHIN THE PAST 12 MONTHS, YOU WORRIED THAT YOUR FOOD WOULD RUN OUT BEFORE YOU GOT MONEY TO BUY MORE.: NEVER TRUE

## 2022-08-04 SDOH — ECONOMIC STABILITY: FOOD INSECURITY: WITHIN THE PAST 12 MONTHS, THE FOOD YOU BOUGHT JUST DIDN'T LAST AND YOU DIDN'T HAVE MONEY TO GET MORE.: NEVER TRUE

## 2022-08-04 ASSESSMENT — ENCOUNTER SYMPTOMS
EYE DISCHARGE: 0
NAUSEA: 0
DIARRHEA: 0
VOMITING: 0
COUGH: 0
SHORTNESS OF BREATH: 0
EYE REDNESS: 0

## 2022-08-04 ASSESSMENT — SOCIAL DETERMINANTS OF HEALTH (SDOH): HOW HARD IS IT FOR YOU TO PAY FOR THE VERY BASICS LIKE FOOD, HOUSING, MEDICAL CARE, AND HEATING?: NOT HARD AT ALL

## 2022-08-04 NOTE — PROGRESS NOTES
HPI Notes    Name: Joshua Caal  : 1965        Chief Complaint:     Chief Complaint   Patient presents with    Tachycardia     Pt went to ER on 22 with c/o heart palpitations. Dx - Tachycardia, Pt instructed to follow up with cardiology. Pt is scheduled to see Dr Andrew Vega on 22. Pt states she is feeling okay. Pt states Dr Andrew Vega has already started you on Diltiazem 120mg daily. History of Present Illness:     Unknown Ten is a 61 y.o.  female who presents with Tachycardia (Pt went to ER on 22 with c/o heart palpitations. Dx - Tachycardia, Pt instructed to follow up with cardiology. Pt is scheduled to see Dr Andrew Vega on 22. Pt states she is feeling okay. Pt states Dr Andrew Vega has already started you on Diltiazem 120mg daily.)      HPI  Tachycardia - Pt has h/o SVT but on 22 pt had sweating and episode of SVT. Pt's heart was racing and pt was already wearing a heart monitor. Pt went to the ER as she was feeling nauseated and like she couldn't breath. Pt was converted back to regular rhythm in ER with vasovagal manuevers. Pt still has a heart monitor on and on  will have heart echo, stress test and labs. But for now no more episodes and just started yesterday taking the Cardizem 120mg and seeing Dr Nikko Figueroa in early Sept. No chest pain or SOB.   Past Medical History:     Past Medical History:   Diagnosis Date    Bell's palsy 2013    Carpal tunnel syndrome     Chicken pox     Hydronephrosis 2009    left     Hyperlipidemia 2013    Pneumonia     Renal atrophy     UTI (urinary tract infection)       Reviewed all health maintenance requirements and ordered appropriate tests  Health Maintenance Due   Topic Date Due    HIV screen  Never done    DTaP/Tdap/Td vaccine (1 - Tdap) Never done    Shingles vaccine (1 of 2) Never done    COVID-19 Vaccine (3 - Booster for Moderna series) 2021    A1C test (Diabetic or Prediabetic)  2022       Past Surgical History:     Past Surgical History:   Procedure Laterality Date     SECTION      COLONOSCOPY N/A 2021    [facility] [address]      HYSTERECTOMY, TOTAL ABDOMINAL (CERVIX REMOVED)      ROTATOR CUFF REPAIR Left 2017        Medications:       Prior to Admission medications    Medication Sig Start Date End Date Taking? Authorizing Provider   dilTIAZem (CARDIZEM CD) 120 MG extended release capsule Take 1 capsule by mouth in the morning. 22  Yes Susy Morataya MD   atorvastatin (LIPITOR) 20 MG tablet Take 1 tablet by mouth in the morning. 22  Yes Payam Prado MD   CRANBERRY CONCENTRATE PO Take by mouth   Yes Historical Provider, MD   fluticasone (FLONASE) 50 MCG/ACT nasal spray USE 2 SPRAYS IN EACH NOSTRIL EVERYDAY  Patient not taking: Reported on 2022 3/23/22   Payam Prado MD        Allergies:       Sulfa antibiotics    Social History:     Tobacco:    reports that she has never smoked. She has never used smokeless tobacco.  Alcohol:      reports no history of alcohol use. Drug Use:  reports no history of drug use. Family History:     Family History   Problem Relation Age of Onset    Cancer Mother         breast    Heart Disease Mother     Stroke Mother     Cancer Father        Review of Systems:       Review of Systems   Constitutional:  Negative for chills, fatigue, fever and unexpected weight change. Eyes:  Negative for discharge and redness. Respiratory:  Negative for cough and shortness of breath. Cardiovascular:  Negative for chest pain and palpitations. Gastrointestinal:  Negative for diarrhea, nausea and vomiting. Skin:  Negative for rash. Neurological:  Negative for dizziness, light-headedness and headaches. Psychiatric/Behavioral:  Negative for sleep disturbance. Physical Exam:     Physical Exam  Vitals reviewed. Constitutional:       General: She is not in acute distress. 07/29/2022 01:42 PM     07/29/2022 01:42 PM    MPV NOT REPORTED 09/05/2020 10:15 AM     Lab Results   Component Value Date/Time    TSH 2.05 07/29/2022 01:42 PM     Lab Results   Component Value Date/Time    CHOL 240 07/27/2022 11:46 AM    CHOL 169 02/03/2014 12:00 AM    HDL 35 07/27/2022 11:46 AM    LABA1C 6.0 07/29/2021 08:37 AM          Assessment/Plan:        1. SVT (supraventricular tachycardia) ([address]  Improved and pt is doing well on the cardizem 120mg and continue to wear heart monitor and keep appt on Aug 11th for heart echo and stress test      Return if symptoms worsen or fail to improve.       Electronically signed by Mallory Sorto MD on 8/4/2022 at 9:29 AM

## 2022-08-11 ENCOUNTER — HOSPITAL ENCOUNTER (OUTPATIENT)
Dept: NON INVASIVE DIAGNOSTICS | Age: 57
Discharge: HOME | End: 2022-08-11
Payer: COMMERCIAL

## 2022-08-11 ENCOUNTER — HOSPITAL ENCOUNTER (OUTPATIENT)
Dept: NUCLEAR MEDICINE | Age: 57
Discharge: HOME | End: 2022-08-13
Payer: COMMERCIAL

## 2022-08-11 ENCOUNTER — HOSPITAL ENCOUNTER (OUTPATIENT)
Dept: MAMMOGRAPHY | Age: 57
Discharge: HOME | End: 2022-08-13
Payer: COMMERCIAL

## 2022-08-11 DIAGNOSIS — Z12.31 OTHER SCREENING MAMMOGRAM: ICD-10-CM

## 2022-08-11 DIAGNOSIS — R06.02 SOB (SHORTNESS OF BREATH): ICD-10-CM

## 2022-08-11 DIAGNOSIS — R07.9 CHEST PAIN, UNSPECIFIED TYPE: ICD-10-CM

## 2022-08-11 DIAGNOSIS — I47.1 SVT (SUPRAVENTRICULAR TACHYCARDIA) (HCC): ICD-10-CM

## 2022-08-11 LAB
LV EF: 60 %
LVEF MODALITY: NORMAL

## 2022-08-11 PROCEDURE — 93016 CV STRESS TEST SUPVJ ONLY: CPT | Performed by: INTERNAL MEDICINE

## 2022-08-11 PROCEDURE — 3430000000 HC RX DIAGNOSTIC RADIOPHARMACEUTICAL: Performed by: INTERNAL MEDICINE

## 2022-08-11 PROCEDURE — 77067 SCR MAMMO BI INCL CAD: CPT

## 2022-08-11 PROCEDURE — 93017 CV STRESS TEST TRACING ONLY: CPT

## 2022-08-11 PROCEDURE — 93018 CV STRESS TEST I&R ONLY: CPT | Performed by: INTERNAL MEDICINE

## 2022-08-11 PROCEDURE — 93306 TTE W/DOPPLER COMPLETE: CPT

## 2022-08-11 PROCEDURE — 78452 HT MUSCLE IMAGE SPECT MULT: CPT | Performed by: FAMILY MEDICINE

## 2022-08-11 PROCEDURE — A9500 TC99M SESTAMIBI: HCPCS | Performed by: INTERNAL MEDICINE

## 2022-08-11 PROCEDURE — 78452 HT MUSCLE IMAGE SPECT MULT: CPT

## 2022-08-11 RX ADMIN — TETRAKIS(2-METHOXYISOBUTYLISOCYANIDE)COPPER(I) TETRAFLUOROBORATE 30 MILLICURIE: 1 INJECTION, POWDER, LYOPHILIZED, FOR SOLUTION INTRAVENOUS at 07:44

## 2022-08-11 RX ADMIN — TETRAKIS(2-METHOXYISOBUTYLISOCYANIDE)COPPER(I) TETRAFLUOROBORATE 10 MILLICURIE: 1 INJECTION, POWDER, LYOPHILIZED, FOR SOLUTION INTRAVENOUS at 07:44

## 2022-08-11 NOTE — PROCEDURES
[place] [address]                              CARDIAC STRESS TEST    PATIENT NAME: Fabi Barrientos                     :        1965  MED REC NO:   [id number]                              ROOM:  ACCOUNT NO:   [de-identified]                           ADMIT DATE: 2022  PROVIDER:     Dhaval Carrington      DATE OF STUDY:  2022    Cardiovascular Diagnostics Department    Ordering Provider:  Rosemarie Laureano MD    Primary Care Provider:  Willa Morales. Caitlyn Awan MD    Interpreting Physician:   Dhaval Carrington MD    MYOCARDIAL PERFUSION STRESS IMAGING    The stress ECG results are reported separately. NUCLEAR IMAGING RESULTS:  The overall quality of the study is excellent. Mild attenuation artifact was seen. There is no evidence of abnormal  lung uptake. Additionally, the right ventricle appears normal.  The  left ventricular cavity is noted to be normal in size on stress images. Gated SPECT imaging reveals normal myocardial thickening and wall motion  with a calculated left ventricular ejection fraction (EF) of 75%. The rest images demonstrated a small perfusion abnormality of mild  intensity in the anteroseptal region which is most likely due to  artifact. On stress imaging, a small perfusion abnormality of mild intensity was  noted in the anterior region which is most likely due to artifact. IMPRESSION:  1. Largely normal myocardial perfusion imaging with soft tissue  artifact, but without evidence of significant myocardial ischemia or  infarction. 2.  Global left ventricular systolic function was normal, without  regional wall motion abnormalities. Overall these results are most consistent with a low risk for  significant coronary artery disease.             Ceci Martin    D: 2022 15:50:07       T: 2022 15:51:31     [email or web address]  Job#: [id number]     Doc#: Unknown    CC:  Juan Morelos

## 2022-08-16 NOTE — PROCEDURES
[address]                                 EVENT MONITOR    PATIENT NAME: Nelida Coley                     :        1965  MED REC NO:   [id number]                              ROOM:  ACCOUNT NO:   [de-identified]                           ADMIT DATE: 2022  PROVIDER:     Jay Johnson    NAME OF TEST:  A 30-day event recorder. INDICATION:  Tachycardia. The patient wore an event recorder from 2022 to 2022. We  received a total of 12 transmissions. Her underlying rhythm was sinus  rhythm at a rate of 60 to 70 beats per minute. However, she had multiple episodes of SVT at 160 to 180 beats per  minute. She was started on Cardizem  mg daily on . Of note,  she had no further episodes of SVT after starting Cardizem.         Matt Garcia    D: 2022 7:20:10       T: 2022 8:45:31     [email or web address]  Job#: [id number]     Doc#: [id number]    CC:  Parish Soto

## 2022-08-18 NOTE — PROCEDURES
[place] [address]                              CARDIAC STRESS TEST    PATIENT NAME: Leonor Hatfield                     :        1965  MED REC NO:   [id number]                              ROOM:  ACCOUNT NO:   [de-identified]                           ADMIT DATE: 2022  PROVIDER:     Catrachita Neville      DATE OF STUDY:  2022    NAME OF TEST:  Treadmill Myoview stress test.    REASON FOR TEST:  Chest pain. She exercised 4 minutes and 44 seconds on accelerated Osvaldo protocol. She achieved 7.6 METs. Peak heart rate was 141, which is 87% of maximum  predicted heart rate. She had no chest pain, no ST depression. Overall  unremarkable treadmill stress test with no evidence of ischemia at this  workload.         Martin Aguirre    D: 2022 5:02:22       T: 2022 5:04:49     [email or web address]  Job#: [id number]     Doc#: [id number]    CC:  Bk Spivey

## 2022-08-22 ENCOUNTER — TELEPHONE (OUTPATIENT)
Dept: FAMILY MEDICINE CLINIC | Age: 57
End: 2022-08-22

## 2022-08-22 DIAGNOSIS — E78.5 HYPERLIPIDEMIA, UNSPECIFIED HYPERLIPIDEMIA TYPE: Primary | ICD-10-CM

## 2022-08-22 NOTE — TELEPHONE ENCOUNTER
Last OV: 8/4/2022    Next scheduled apt: Visit date not found      Patient called in stating she was started on Atrovastatin recently and now is having pain/cramping in both legs. She has tried aleve, ibuprofen, heat and ice to relieve the pain. Symptoms started a week ago. Patient stopped taking medication 2 days ago after a pharmacist suggested this could be a side effect.     Patient requesting advisement

## 2022-08-22 NOTE — TELEPHONE ENCOUNTER
I spoke with Aidan Sultana asking her if the pain is improving after stopping the Lipitor. Patient reports pain is getting better after stopping the Lipitor. I told Pt this would be addressed tomorrow when Dr Kirsten Moore is back in the office.

## 2022-08-23 RX ORDER — ATORVASTATIN CALCIUM 20 MG/1
TABLET, FILM COATED ORAL
Qty: 30 TABLET | Refills: 5 | OUTPATIENT
Start: 2022-08-23

## 2022-08-23 NOTE — TELEPHONE ENCOUNTER
So tell pt I am glad feeling better off the lipitor 20mg. However, I would like her to try one other statin medication as some cause more the muscle aches than other statins do. So please pend crestor 5mg and have pt try it once a day -- prefer night time as a statin reduces her cardiac disease risk factors so we need to if this one will not cause the muscle aches.  If does cause achiness then stop and let me know but if pt does ok then stay on the medication and labs in [address] [place] [address]

## 2022-08-24 RX ORDER — ROSUVASTATIN CALCIUM 5 MG/1
5 TABLET, COATED ORAL DAILY
Qty: 30 TABLET | Refills: 5 | Status: SHIPPED | OUTPATIENT
Start: 2022-08-24 | End: 2022-09-01

## 2022-08-24 NOTE — TELEPHONE ENCOUNTER
Patient informed of Dr Sujatha Capone recommendations. Agrees to try on crestor, requests rx to [facility]. Advised will recheck labs in 6 months.

## 2022-08-26 ENCOUNTER — TELEPHONE (OUTPATIENT)
Dept: FAMILY MEDICINE CLINIC | Age: 57
End: 2022-08-26

## 2022-08-26 NOTE — TELEPHONE ENCOUNTER
Last OV: 8/4/2022        Pt called stating she wanted a wellness exam for her visit 8/4 and is requesting it being coded as so. Please advise if willing and will call the patient . [phone]      Thank you

## 2022-09-01 ENCOUNTER — TELEPHONE (OUTPATIENT)
Dept: FAMILY MEDICINE CLINIC | Age: 57
End: 2022-09-01

## 2022-09-01 NOTE — TELEPHONE ENCOUNTER
Actually tell pt I reviewed her labs and since she is not Diabetic, BP controlled and no smoking SHe does not have to try another statin medication. I would recommend however she works on LOW fat diet -- especially NO trans fat and <5 grams of Saturated fat per serving when looking at food labels. So good low fat diet and exercise.

## 2022-09-14 ENCOUNTER — OFFICE VISIT (OUTPATIENT)
Dept: CARDIOLOGY CLINIC | Age: 57
End: 2022-09-14
Payer: COMMERCIAL

## 2022-09-14 ENCOUNTER — HOSPITAL ENCOUNTER (OUTPATIENT)
Dept: VASCULAR LAB | Age: 57
Discharge: HOME | End: 2022-09-16
Payer: COMMERCIAL

## 2022-09-14 VITALS
OXYGEN SATURATION: 97 % | SYSTOLIC BLOOD PRESSURE: 140 MMHG | BODY MASS INDEX: 44.77 KG/M2 | HEART RATE: 88 BPM | WEIGHT: 293 LBS | DIASTOLIC BLOOD PRESSURE: 80 MMHG

## 2022-09-14 DIAGNOSIS — M79.604 PAIN OF RIGHT LOWER EXTREMITY: ICD-10-CM

## 2022-09-14 DIAGNOSIS — R60.0 LEG EDEMA, RIGHT: ICD-10-CM

## 2022-09-14 DIAGNOSIS — R73.09 ELEVATED GLUCOSE: ICD-10-CM

## 2022-09-14 DIAGNOSIS — E78.5 HYPERLIPIDEMIA, UNSPECIFIED HYPERLIPIDEMIA TYPE: Primary | ICD-10-CM

## 2022-09-14 PROCEDURE — 93971 EXTREMITY STUDY: CPT

## 2022-09-14 PROCEDURE — 99204 OFFICE O/P NEW MOD 45 MIN: CPT | Performed by: INTERNAL MEDICINE

## 2022-09-14 RX ORDER — SPIRONOLACTONE 25 MG/1
25 TABLET ORAL DAILY
Qty: 30 TABLET | Refills: 11 | Status: SHIPPED | OUTPATIENT
Start: 2022-09-14 | End: 2022-10-06

## 2022-09-14 NOTE — LETTER
Tayler Ornelas M.D. [address] [place] [address]  [phone]        2022        Dariela Anderson MD  [address] [place] [address] 79    RE:   Misael Martin  :  1965    Dear Dr. Keerthi García:    CHIEF COMPLAINT:  1. SVT associated with dizziness and lightheadedness with rates up to 190, although no further known episodes after starting Cardizem  mg daily. 2.  Hyperlipidemia with an LDL of 154.  3.  Discomfort in her right calf. HISTORY OF PRESENT ILLNESS:  I had the pleasure of seeing Shant Dockery in our office on 2022. She has a long history of intermittent \"flutters\" in her chest.  It has never persisted and she had had no syncope or near-syncopal episodes. 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 She began developing more episodes of palpitations and lightheadedness. You saw her on 2022, and noted that she had had palpitations for one year but has not really caught any symptoms. However, she began developing episodes where her heart was pounding and persisted. She then began developing some dizziness. It occurred when she was sitting or standing. She had no chest pain. You saw her then on 2022, and ordered an event recorder. On , she came to the emergency room with SVT. She had been put on metoprolol for her tachycardia when you had seen her; however, when she came into the emergency room, her heart rate was 174. She had no chest pain or shortness of breath. She did have some lightheadedness. We then stopped metoprolol and started Cardizem  mg daily. We did a stress test and echocardiogram and scheduled her to see us. Echocardiogram was essentially normal, with normal LV function. EF was 60% with no significant valve abnormalities.   Her stress test was Rotator cuff repair on the left in 11/2017. FAMILY HISTORY:  Mother had valve replacement. Sister had atrial fibrillation. Another sister has no medical problems. SOCIAL HISTORY:  She is 61years old,  to [employer]. 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 She does not smoke, does not drink alcohol. REVIEW OF SYSTEMS:  Cardiac as above. Other systems reviewed including constitutional, eyes, ears, nose and throat, cardiovascular, respiratory, GI, , musculoskeletal, integumentary, neurologic, psychiatric, endocrine, hematologic and allergic/immunologic, which were all negative except for what is described above. PHYSICAL EXAMINATION:  VITAL SIGNS:  Her blood pressure was 140/80 with a heart rate of 88 and regular. Respiratory rate 18. O2 sat 97%. Weight 212 pounds. GENERAL:  She is a very pleasant 50-year-old female. Denied pain. She was oriented to person, place and time. Answered questions appropriately. SKIN:  No unusual skin changes. HEENT:  The pupils are equally round and intact. Mucous membranes were dry. NECK:  No JVD. Good carotid pulses. No carotid bruits. No lymphadenopathy or thyromegaly. CARDIOVASCULAR EXAM:  S1 and S2 were normal.  No S3 or S4. Soft systolic blowing type murmur. No diastolic murmur. PMI was normal.  No lift, thrust, or pericardial friction rub. LUNGS:  Clear to auscultation and percussion. ABDOMEN:  Soft and nontender. Good bowel sounds. EXTREMITIES:  Good femoral pulses. Good pedal pulses. She had 2+ edema. Skin was warm and dry. No calf tenderness. Nail beds pink. Good cap refill. PULSES:  Bilateral symmetrical radial, brachial and carotid pulses. No carotid bruits. Good femoral and pedal pulses. NEUROLOGIC EXAM:  Within normal limits. PSYCHIATRIC EXAM:  Within normal limits. LABORATORY DATA:  From 07/29, sodium 138, potassium 4.3, BUN 15, creatinine 0.92, GFR greater than 60, glucose was 131. Cholesterol was 240, HDL 35, , triglycerides 256. ALT was 31, AST was 20. Her white count was 11.5, hemoglobin 14.6 with a platelet count 138,312. Her EKG shows normal sinus rhythm, was normal.    Her event recorder showed multiple episodes of SVT up to 190 beats per minute. She had no further known episodes of SVT after she started her Cardizem  mg daily. IMPRESSION:  1.  SVT, which appears to be controlled with Cardizem  mg daily, with symptoms of lightheadedness and dizziness when she is untreated with heart rates up to 190.  2.  Normal Lexiscan Cardiolite stress test.  3.  Normal echocardiogram with no significant valve abnormality with normal LV function. 4.  Sleep apnea, on a CPAP mask. 5.  Hyperlipidemia with a total cholesterol of 240 and LDL of 154.  6.  Pain in her lower extremities with Lipitor, although it has persisted after Lipitor was stopped, primarily in her right calf which is tender, painful. 7.  Pedal edema, most likely secondary to her sleep apnea and mildly increased pulmonary pressures. PLAN:  1. We will do ultrasound for DVT in her right lower extremity. 2.  Agree with starting Crestor 5 mg daily and seeing if her pain changes in her lower extremities. 3.  Start Aldactone 25 mg daily. 4.  We will do a Chem-7 and hemoglobin A1c in one week. 5.  I will see her in two months with a repeat lipid profile and to reevaluate her palpitations as well as her pain in her extremities. DISCUSSION:  [facility] sounds like she has had a long history of SVT, which has become more frequent, prolonged and symptomatic, which would be expected as she grows older. She had documented multiple episodes up to 190 beats per minute on her event recorder. This was while she was on metoprolol. On Cardizem, she seems to have a good response with no further known episodes of SVT, although I would not be surprised to have more episodes occur in the future.     With her normal echocardiogram and normal stress test, we could use flecainide. 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 She does complain of pain, especially in her right lower extremity. She was blaming on the Lipitor, although it is still going on after stopping Lipitor. Therefore, we will do the ultrasound on her right lower extremity. I agree with your approach on treating her hyperlipidemia. At this level, I doubt that her diet and exercise program would reduce this significantly. I, just like you are doing, use the lowest dose of any of the statins, in this case Crestor 5 mg daily, and recheck in two months. I did ask her to start Crestor and to call if her leg pain worsens. If the DVT workup is negative and she continues to have her discomfort, I would consider changing her Cardizem to flecainide and holding any statin including Crestor and see if it totally resolves as I cannot rule out that it is partly due to her Cardizem. We will see the effect of spironolactone and we will do a BMP in one week. I will see her in two months. It was a pleasure seeing Mrs. Roshan Kamara especially since we are seeing her , Candelaria Gambino. Thank you very much for allowing me the privilege of seeing her. If you have any questions on my thoughts, please do not hesitate to contact me.     Sincerely,        Gilma Held    D: 09/14/2022 8:40:32     T: 09/14/2022 8:47:54     [email or web address]  Job#: [id number]   Doc#: [id number]

## 2022-09-14 NOTE — PROGRESS NOTES
Ov DR Jonh Cruz consult   Was in ED July 29 for sob  And dizziness in SVT  Has had episodes of fast   Heart beat more often   This summer   Last on lasted couple hours. None since in ED. Had stress test and echo. No chest pain or sob. Occ dizziness felt like   Passing out. Some  ankle edema   Pain in rt calf area   Will do u/s    Was started on Lipitor pain   In legs pt stopped med. Given crestor  5 mg to try   Encouraged to start it. Then we will recheck lipids   In 2 mths. See pt then    Pt has no history heart problems  Mother had valve replacement  Sister has a fib. Other sister no medical problems. Pt has a cpap uses every night.  is [employer]. Pt is a .      Pt called with doppler results

## 2022-09-19 NOTE — PROGRESS NOTES
Baljinder Briceno M.D. [address] [place] [address], [facility]  [phone]        2022        Kelli Ortiz MD  [address] [place] [address], [facility]    RE:   Christelle Dumont  :  1965    Dear Dr. Thierry Ferraro:    CHIEF COMPLAINT:  1. SVT associated with dizziness and lightheadedness with rates up to 190, although no further known episodes after starting Cardizem  mg daily. 2.  Hyperlipidemia with an LDL of 154.  3.  Discomfort in her right calf. HISTORY OF PRESENT ILLNESS:  I had the pleasure of seeing Nithin Catalan in our office on 2022. She has a long history of intermittent \"flutters\" in her chest.  It has never persisted and she had had no syncope or near-syncopal episodes. 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 She began developing more episodes of palpitations and lightheadedness. You saw her on 2022, and noted that she had had palpitations for one year but has not really caught any symptoms. However, she began developing episodes where her heart was pounding and persisted. She then began developing some dizziness. It occurred when she was sitting or standing. She had no chest pain. You saw her then on 2022, and ordered an event recorder. On , she came to the emergency room with SVT. She had been put on metoprolol for her tachycardia when you had seen her; however, when she came into the emergency room, her heart rate was 174. She had no chest pain or shortness of breath. She did have some lightheadedness. We then stopped metoprolol and started Cardizem  mg daily. We did a stress test and echocardiogram and scheduled her to see us. Echocardiogram was essentially normal, with normal LV function. EF was 60% with no significant valve abnormalities.   Her stress test was also normal.    She has had no further episodes of tachyarrhythmia. Denies any chest pain. She has had no unusual shortness of breath. Of note, she does have sleep apnea and has been on a CPAP machine for the last year. She does have a history of hyperlipidemia. She was placed on initially Lipitor 20 mg daily and developed what she thought was pain in her legs from Lipitor. This was stopped and she was placed on Crestor 5 mg daily, although she has not started Crestor as of yet. Her legs, especially her right calf, began hurting approximately in the first part of August.  Stopping Lipitor did not make any difference in her discomfort. 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 She has some soreness in the right calf. She has had edema in both lower extremities, perhaps slightly more than it has been previously. She has never had any previous cardiac issues and never seen a cardiologist and has never had any workup for palpitations until they worsened and you saw her on 2022. When I see her today, she is doing well. She denies any chest pain, no unusual shortness of breath. 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 No PND or orthopnea. No syncope or near syncope. CARDIAC RISK FACTORS:  Hypertension:  Negative. Peripheral Vascular Disease:  Negative. Hyperlipidemia:  Positive. Other Family Members:  Negative. Smoking:  Negative. Diabetes:  Negative. MEDICATIONS AT HOME:  She is currently on Cardizem  mg daily. PAST MEDICAL AND SURGICAL HISTORY:  1. She has sleep apnea and is on a CPAP mask. 2.  She had Bell's palsy on 2013. 3.  Carpal tunnel syndrome. 4.  Hydronephrosis in her left kidney in .  5.  Hyperlipidemia since . 6.  . 7.  Colonoscopy on 2021.  8.  Cystoscopy in . 9.  Hysterectomy in .   10. Rotator cuff repair on the left in 11/2017. FAMILY HISTORY:  Mother had valve replacement. Sister had atrial fibrillation. Another sister has no medical problems. SOCIAL HISTORY:  She is 61years old,  to [employer]. 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 She does not smoke, does not drink alcohol. REVIEW OF SYSTEMS:  Cardiac as above. Other systems reviewed including constitutional, eyes, ears, nose and throat, cardiovascular, respiratory, GI, , musculoskeletal, integumentary, neurologic, psychiatric, endocrine, hematologic and allergic/immunologic, which were all negative except for what is described above. PHYSICAL EXAMINATION:  VITAL SIGNS:  Her blood pressure was 140/80 with a heart rate of 88 and regular. Respiratory rate 18. O2 sat 97%. Weight 212 pounds. GENERAL:  She is a very pleasant 58-year-old female. Denied pain. She was oriented to person, place and time. Answered questions appropriately. SKIN:  No unusual skin changes. HEENT:  The pupils are equally round and intact. Mucous membranes were dry. NECK:  No JVD. Good carotid pulses. No carotid bruits. No lymphadenopathy or thyromegaly. CARDIOVASCULAR EXAM:  S1 and S2 were normal.  No S3 or S4. Soft systolic blowing type murmur. No diastolic murmur. PMI was normal.  No lift, thrust, or pericardial friction rub. LUNGS:  Clear to auscultation and percussion. ABDOMEN:  Soft and nontender. Good bowel sounds. EXTREMITIES:  Good femoral pulses. Good pedal pulses. She had 2+ edema. Skin was warm and dry. No calf tenderness. Nail beds pink. Good cap refill. PULSES:  Bilateral symmetrical radial, brachial and carotid pulses. No carotid bruits. Good femoral and pedal pulses. NEUROLOGIC EXAM:  Within normal limits. PSYCHIATRIC EXAM:  Within normal limits. LABORATORY DATA:  From 07/29, sodium 138, potassium 4.3, BUN 15, creatinine 0.92, GFR greater than 60, glucose was 131. Cholesterol was 240, HDL 35, , triglycerides 256. ALT was 31, AST was 20. Her white count was 11.5, hemoglobin 14.6 with a platelet count 220,214. Her EKG shows normal sinus rhythm, was normal.    Her event recorder showed multiple episodes of SVT up to 190 beats per minute. She had no further known episodes of SVT after she started her Cardizem  mg daily. IMPRESSION:  1.  SVT, which appears to be controlled with Cardizem  mg daily, with symptoms of lightheadedness and dizziness when she is untreated with heart rates up to 190.  2.  Normal Lexiscan Cardiolite stress test.  3.  Normal echocardiogram with no significant valve abnormality with normal LV function. 4.  Sleep apnea, on a CPAP mask. 5.  Hyperlipidemia with a total cholesterol of 240 and LDL of 154.  6.  Pain in her lower extremities with Lipitor, although it has persisted after Lipitor was stopped, primarily in her right calf which is tender, painful. 7.  Pedal edema, most likely secondary to her sleep apnea and mildly increased pulmonary pressures. PLAN:  1. We will do ultrasound for DVT in her right lower extremity. 2.  Agree with starting Crestor 5 mg daily and seeing if her pain changes in her lower extremities. 3.  Start Aldactone 25 mg daily. 4.  We will do a Chem-7 and hemoglobin A1c in one week. 5.  I will see her in two months with a repeat lipid profile and to reevaluate her palpitations as well as her pain in her extremities. DISCUSSION:  [facility] sounds like she has had a long history of SVT, which has become more frequent, prolonged and symptomatic, which would be expected as she grows older. She had documented multiple episodes up to 190 beats per minute on her event recorder. This was while she was on metoprolol. On Cardizem, she seems to have a good response with no further known episodes of SVT, although I would not be surprised to have more episodes occur in the future.     With her normal echocardiogram and normal stress test, we could use flecainide. 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 She does complain of pain, especially in her right lower extremity. She was blaming on the Lipitor, although it is still going on after stopping Lipitor. Therefore, we will do the ultrasound on her right lower extremity. I agree with your approach on treating her hyperlipidemia. At this level, I doubt that her diet and exercise program would reduce this significantly. I, just like you are doing, use the lowest dose of any of the statins, in this case Crestor 5 mg daily, and recheck in two months. I did ask her to start Crestor and to call if her leg pain worsens. If the DVT workup is negative and she continues to have her discomfort, I would consider changing her Cardizem to flecainide and holding any statin including Crestor and see if it totally resolves as I cannot rule out that it is partly due to her Cardizem. We will see the effect of spironolactone and we will do a BMP in one week. I will see her in two months. It was a pleasure seeing Mrs. Shazia Diop especially since we are seeing her , Michele Oakley. Thank you very much for allowing me the privilege of seeing her. If you have any questions on my thoughts, please do not hesitate to contact me.     Sincerely,        Natalia Rosales    D: 09/14/2022 8:40:32     T: 09/14/2022 8:47:54     [id number]  Job#: [id number]   Doc#: [id number]

## 2022-09-20 DIAGNOSIS — E78.5 HYPERLIPIDEMIA, UNSPECIFIED HYPERLIPIDEMIA TYPE: ICD-10-CM

## 2022-09-20 RX ORDER — ROSUVASTATIN CALCIUM 5 MG/1
TABLET, COATED ORAL
Qty: 30 TABLET | Refills: 5 | OUTPATIENT
Start: 2022-09-20

## 2022-09-22 ENCOUNTER — HOSPITAL ENCOUNTER (OUTPATIENT)
Age: 57
Setting detail: SPECIMEN
Discharge: HOME | End: 2022-09-22
Payer: COMMERCIAL

## 2022-09-22 ENCOUNTER — OFFICE VISIT (OUTPATIENT)
Dept: UROLOGY | Age: 57
End: 2022-09-22
Payer: COMMERCIAL

## 2022-09-22 VITALS
TEMPERATURE: 97.8 F | BODY MASS INDEX: 45.05 KG/M2 | SYSTOLIC BLOOD PRESSURE: 148 MMHG | DIASTOLIC BLOOD PRESSURE: 90 MMHG | WEIGHT: 293 LBS | HEART RATE: 85 BPM

## 2022-09-22 DIAGNOSIS — R33.9 INCOMPLETE BLADDER EMPTYING: ICD-10-CM

## 2022-09-22 DIAGNOSIS — R30.0 DYSURIA: ICD-10-CM

## 2022-09-22 DIAGNOSIS — N39.0 URINARY TRACT INFECTION DUE TO EXTENDED-SPECTRUM BETA LACTAMASE (ESBL) PRODUCING ESCHERICHIA COLI: ICD-10-CM

## 2022-09-22 DIAGNOSIS — B96.29 URINARY TRACT INFECTION DUE TO EXTENDED-SPECTRUM BETA LACTAMASE (ESBL) PRODUCING ESCHERICHIA COLI: ICD-10-CM

## 2022-09-22 DIAGNOSIS — N95.2 VAGINAL ATROPHY: ICD-10-CM

## 2022-09-22 DIAGNOSIS — N39.0 FREQUENT UTI: Primary | ICD-10-CM

## 2022-09-22 DIAGNOSIS — Z16.12 URINARY TRACT INFECTION DUE TO EXTENDED-SPECTRUM BETA LACTAMASE (ESBL) PRODUCING ESCHERICHIA COLI: ICD-10-CM

## 2022-09-22 DIAGNOSIS — N39.0 FREQUENT UTI: ICD-10-CM

## 2022-09-22 DIAGNOSIS — M54.50 ACUTE MIDLINE LOW BACK PAIN WITHOUT SCIATICA: ICD-10-CM

## 2022-09-22 LAB
BACTERIA: ABNORMAL
BILIRUBIN URINE: NEGATIVE
COLOR: YELLOW
EPITHELIAL CELLS UA: ABNORMAL /HPF (ref 0–25)
GLUCOSE URINE: NEGATIVE
KETONES, URINE: NEGATIVE
LEUKOCYTE ESTERASE, URINE: ABNORMAL
NITRITE, URINE: POSITIVE
PH UA: 6 (ref 5–9)
PROTEIN UA: NEGATIVE
RBC UA: ABNORMAL /HPF (ref 0–2)
SPECIFIC GRAVITY UA: >1.03 (ref 1.01–1.02)
TURBIDITY: CLEAR
URINE HGB: ABNORMAL
UROBILINOGEN, URINE: NORMAL
WBC UA: ABNORMAL /HPF (ref 0–5)

## 2022-09-22 PROCEDURE — 99214 OFFICE O/P EST MOD 30 MIN: CPT | Performed by: NURSE PRACTITIONER

## 2022-09-22 PROCEDURE — 87186 SC STD MICRODIL/AGAR DIL: CPT

## 2022-09-22 PROCEDURE — 51798 US URINE CAPACITY MEASURE: CPT | Performed by: NURSE PRACTITIONER

## 2022-09-22 PROCEDURE — 81001 URINALYSIS AUTO W/SCOPE: CPT

## 2022-09-22 PROCEDURE — 87086 URINE CULTURE/COLONY COUNT: CPT

## 2022-09-22 PROCEDURE — 87077 CULTURE AEROBIC IDENTIFY: CPT

## 2022-09-22 RX ORDER — ESTRADIOL 0.1 MG/G
CREAM VAGINAL
Qty: 42.5 G | Refills: 3 | Status: SHIPPED | OUTPATIENT
Start: 2022-09-22

## 2022-09-22 RX ORDER — NITROFURANTOIN 25; 75 MG/1; MG/1
100 CAPSULE ORAL 2 TIMES DAILY
Qty: 20 CAPSULE | Refills: 0 | Status: SHIPPED | OUTPATIENT
Start: 2022-09-22 | End: 2022-09-26

## 2022-09-22 ASSESSMENT — ENCOUNTER SYMPTOMS
APNEA: 0
SHORTNESS OF BREATH: 0
NAUSEA: 0
ABDOMINAL PAIN: 0
BACK PAIN: 1
COLOR CHANGE: 0
EYE REDNESS: 0
COUGH: 0
CONSTIPATION: 0
VOMITING: 0
WHEEZING: 0

## 2022-09-22 NOTE — PROGRESS NOTES
HPI:        Patient is a 61 y.o. female in no acute distress. She is alert and oriented to person, place, and time. History  2010 Hysterectomy and oophorectomy     Previous patient of Dr. Juany Tripathi for frequent UTI. On daily antibiotic    4/2015 referral for frequent UTI  Vaginal estrogen    LOST TO FOLLOW UP    7/2018 Referral from the ER for frequent UTI. She does complain of dysuria, frequency and abdominal pain when she develops a UTI. 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 There is a questionable ectopic ureterocele. The radiologist does feel that this could represent a large nabothian cyst or cystic/necrotic cervical malignancy. Patient did have a total abdominal hysterectomy in 2010 due to endometriosis with Dr. Bhavna Donato. It is unclear if this is a radical hysterectomy with complete removal of the cervix. She denies constipation. 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 Cystoscopy showed fullness on the left side of the floor of the bladder,  no distinct cystocele seen. 12/2018 last seen    LOST TO FOLLOW UP    11/2021 - recurrent urinary tract infection returned to reestablish care    12/2021 - cranberry and vaginal estrogen started     Today  Here today with complaints of low back pain, groin pain, and dysuria. Her symptoms started yesterday. She denies fevers, nausea or vomiting. She denies any gross hematuria. She is taking cranberry extract, but is not using vaginal estrogen as prescribed. PVR is low.     Past Medical History:   Diagnosis Date    Bell's palsy 6/6/2013    Carpal tunnel syndrome     Chicken pox     Hydronephrosis 2009    left     Hyperlipidemia 2013    Pneumonia     Renal atrophy     UTI (urinary tract infection)      Past Surgical History:   Procedure Laterality Date     SECTION      COLONOSCOPY N/A 2021    [facility]      HYSTERECTOMY, TOTAL ABDOMINAL (CERVIX REMOVED)      ROTATOR CUFF REPAIR Left 2017     Outpatient Encounter Medications as of 2022   Medication Sig Dispense Refill    estradiol (ESTRACE VAGINAL) 0.1 MG/GM vaginal cream Place a pea-sized amount vaginally daily x 2 weeks, then 3 times per week thereafter. Use finger to apply, wash hands after application. DO NOT use plastic applicator. Call office if you stop this medication. 42.5 g 3    nitrofurantoin, macrocrystal-monohydrate, (MACROBID) 100 MG capsule Take 1 capsule by mouth 2 times daily for 10 days 20 capsule 0    spironolactone (ALDACTONE) 25 MG tablet Take 1 tablet by mouth daily 30 tablet 11    dilTIAZem (CARDIZEM CD) 120 MG extended release capsule Take 1 capsule by mouth in the morning. 30 capsule 11    CRANBERRY CONCENTRATE PO Take by mouth       No facility-administered encounter medications on file as of 2022. Current Outpatient Medications on File Prior to Visit   Medication Sig Dispense Refill    spironolactone (ALDACTONE) 25 MG tablet Take 1 tablet by mouth daily 30 tablet 11    dilTIAZem (CARDIZEM CD) 120 MG extended release capsule Take 1 capsule by mouth in the morning. 30 capsule 11    CRANBERRY CONCENTRATE PO Take by mouth       No current facility-administered medications on file prior to visit.      Sulfa antibiotics  Family History   Problem Relation Age of Onset    Cancer Mother         breast    Heart Disease Mother     Stroke Mother     Cancer Father      Social History     Tobacco Use   Smoking Status Never   Smokeless Tobacco Never       Social History     Substance and Sexual Activity   Alcohol Use No    Alcohol/week: 0.0 standard drinks       Review of Systems   Constitutional:  Negative for appetite change, chills and fever. Eyes:  Negative for redness and visual disturbance. Respiratory:  Negative for apnea, cough, shortness of breath and wheezing. Cardiovascular:  Negative for chest pain and leg swelling. Gastrointestinal:  Negative for abdominal pain, constipation, nausea and vomiting. Genitourinary:  Positive for dysuria and pelvic pain. Negative for difficulty urinating, dyspareunia, enuresis, flank pain, frequency, hematuria, urgency, vaginal bleeding and vaginal discharge. Musculoskeletal:  Positive for back pain. Negative for joint swelling and myalgias. Skin:  Negative for color change, rash and wound. Neurological:  Negative for dizziness, tremors and numbness. Hematological:  Negative for adenopathy. Does not bruise/bleed easily. Psychiatric/Behavioral:  Negative for sleep disturbance. BP (!) 148/90 (Site: Right Upper Arm, Position: Sitting, Cuff Size: Large Adult)   Pulse 85   Temp 97.8 °F (36.6 °C)   Wt (!) 314 lb (142.4 kg)   BMI 45.05 kg/m²       PHYSICAL EXAM:  Constitutional: Patient resting comfortably, in no acute distress. Neuro: Alert and oriented to person place and time. Cranial nerves grossly intact. Psych: Mood and affect normal.  Skin: Warm, dry  HEENT: normocephalic, atraumatic  Lymphatics: No palpable lymphadenopathy  Lungs: Respiratory effort normal, unlabored  Cardiovascular:  Normal peripheral pulses  Abdomen: Soft, non-tender, non-distended with no organomegaly or palpable masses. : No CVA tenderness. Bladder non-tender and not distended. Pelvic: Deferred    Lab Results   Component Value Date    BUN 15 07/29/2022     Lab Results   Component Value Date    CREATININE 0.92 (H) 07/29/2022       ASSESSMENT:   Diagnosis Orders   1. Frequent UTI  NH MEASUREMENT,POST-VOID RESIDUAL VOLUME BY US,NON-IMAGING    Urinalysis with Microscopic    Culture, Urine      2.  Vaginal atrophy  NH MEASUREMENT,POST-VOID RESIDUAL VOLUME BY US,NON-IMAGING    Urinalysis with Microscopic    Culture, Urine    estradiol (ESTRACE VAGINAL) 0.1 MG/GM vaginal cream      3. Incomplete bladder emptying  VT MEASUREMENT,POST-VOID RESIDUAL VOLUME BY US,NON-IMAGING    Urinalysis with Microscopic    Culture, Urine      4. Urinary tract infection due to extended-spectrum beta lactamase (ESBL) producing Escherichia coli  VT MEASUREMENT,POST-VOID RESIDUAL VOLUME BY US,NON-IMAGING    Urinalysis with Microscopic    Culture, Urine      5. Acute midline low back pain without sciatica  VT MEASUREMENT,POST-VOID RESIDUAL VOLUME BY US,NON-IMAGING    Urinalysis with Microscopic    Culture, Urine      6. Dysuria  VT MEASUREMENT,POST-VOID RESIDUAL VOLUME BY US,NON-IMAGING    Urinalysis with Microscopic    Culture, Urine              PLAN:  UACS    Empiric macrobid (culture specific per last culture)    Concern due to history of MDR    Resume vaginal estrogen. Place a pea-sized amount vaginally nightly x 2 weeks, then use 3 nights per week thereafter. Do NOT use plastic applicator.      F/U pending results and in 6 months

## 2022-09-24 LAB
CULTURE: ABNORMAL
SPECIMEN DESCRIPTION: ABNORMAL

## 2022-09-26 ENCOUNTER — HOSPITAL ENCOUNTER (OUTPATIENT)
Dept: INFUSION THERAPY | Age: 57
Discharge: HOME | End: 2022-09-26
Payer: COMMERCIAL

## 2022-09-26 ENCOUNTER — HOSPITAL ENCOUNTER (OUTPATIENT)
Dept: INFUSION THERAPY | Age: 57
End: 2022-09-26

## 2022-09-26 ENCOUNTER — TELEPHONE (OUTPATIENT)
Dept: UROLOGY | Age: 57
End: 2022-09-26

## 2022-09-26 ENCOUNTER — PATIENT MESSAGE (OUTPATIENT)
Dept: UROLOGY | Age: 57
End: 2022-09-26

## 2022-09-26 VITALS
RESPIRATION RATE: 20 BRPM | SYSTOLIC BLOOD PRESSURE: 152 MMHG | DIASTOLIC BLOOD PRESSURE: 80 MMHG | TEMPERATURE: 97.5 F | HEART RATE: 81 BPM

## 2022-09-26 VITALS — TEMPERATURE: 97 F

## 2022-09-26 DIAGNOSIS — N39.0 URINARY TRACT INFECTION DUE TO ESBL KLEBSIELLA: Primary | ICD-10-CM

## 2022-09-26 DIAGNOSIS — N39.0 URINARY TRACT INFECTION DUE TO ESBL KLEBSIELLA: ICD-10-CM

## 2022-09-26 DIAGNOSIS — Z16.12 URINARY TRACT INFECTION DUE TO EXTENDED-SPECTRUM BETA LACTAMASE (ESBL) PRODUCING ESCHERICHIA COLI: ICD-10-CM

## 2022-09-26 DIAGNOSIS — B96.89 URINARY TRACT INFECTION DUE TO ESBL KLEBSIELLA: Primary | ICD-10-CM

## 2022-09-26 DIAGNOSIS — E78.5 HYPERLIPIDEMIA, UNSPECIFIED HYPERLIPIDEMIA TYPE: ICD-10-CM

## 2022-09-26 DIAGNOSIS — N39.0 FREQUENT UTI: Primary | ICD-10-CM

## 2022-09-26 DIAGNOSIS — N39.0 FREQUENT UTI: ICD-10-CM

## 2022-09-26 DIAGNOSIS — B96.29 URINARY TRACT INFECTION DUE TO EXTENDED-SPECTRUM BETA LACTAMASE (ESBL) PRODUCING ESCHERICHIA COLI: ICD-10-CM

## 2022-09-26 DIAGNOSIS — N39.0 URINARY TRACT INFECTION DUE TO EXTENDED-SPECTRUM BETA LACTAMASE (ESBL) PRODUCING ESCHERICHIA COLI: ICD-10-CM

## 2022-09-26 DIAGNOSIS — B96.89 URINARY TRACT INFECTION DUE TO ESBL KLEBSIELLA: ICD-10-CM

## 2022-09-26 DIAGNOSIS — M79.604 PAIN OF RIGHT LOWER EXTREMITY: ICD-10-CM

## 2022-09-26 DIAGNOSIS — R73.09 ELEVATED GLUCOSE: ICD-10-CM

## 2022-09-26 DIAGNOSIS — R33.9 INCOMPLETE BLADDER EMPTYING: ICD-10-CM

## 2022-09-26 DIAGNOSIS — R60.0 LEG EDEMA, RIGHT: ICD-10-CM

## 2022-09-26 LAB
ABSOLUTE EOS #: 0.28 K/UL (ref 0–0.44)
ABSOLUTE IMMATURE GRANULOCYTE: 0.03 K/UL (ref 0–0.3)
ABSOLUTE LYMPH #: 1.99 K/UL (ref 1.1–3.7)
ABSOLUTE MONO #: 0.86 K/UL (ref 0.1–1.2)
ALBUMIN SERPL-MCNC: 4.6 G/DL (ref 3.5–5.2)
ALBUMIN/GLOBULIN RATIO: 1.4 (ref 1–2.5)
ALP BLD-CCNC: 96 U/L (ref 35–104)
ALT SERPL-CCNC: 32 U/L (ref 5–33)
ANION GAP SERPL CALCULATED.3IONS-SCNC: 12 MMOL/L (ref 9–17)
AST SERPL-CCNC: 20 U/L
BASOPHILS # BLD: 1 % (ref 0–2)
BASOPHILS ABSOLUTE: 0.07 K/UL (ref 0–0.2)
BILIRUB SERPL-MCNC: 0.4 MG/DL (ref 0.3–1.2)
BUN BLDV-MCNC: 17 MG/DL (ref 6–20)
BUN/CREAT BLD: 22 (ref 9–20)
CALCIUM SERPL-MCNC: 10.6 MG/DL (ref 8.6–10.4)
CHLORIDE BLD-SCNC: 102 MMOL/L (ref 98–107)
CO2: 27 MMOL/L (ref 20–31)
CREAT SERPL-MCNC: 0.77 MG/DL (ref 0.5–0.9)
EOSINOPHILS RELATIVE PERCENT: 3 % (ref 1–4)
GFR AFRICAN AMERICAN: >60 ML/MIN
GFR NON-AFRICAN AMERICAN: >60 ML/MIN
GFR SERPL CREATININE-BSD FRML MDRD: ABNORMAL ML/MIN/{1.73_M2}
GFR SERPL CREATININE-BSD FRML MDRD: ABNORMAL ML/MIN/{1.73_M2}
GLUCOSE BLD-MCNC: 109 MG/DL (ref 70–99)
HCT VFR BLD CALC: 44 % (ref 36.3–47.1)
HEMOGLOBIN: 14.7 G/DL (ref 11.9–15.1)
IMMATURE GRANULOCYTES: 0 %
LYMPHOCYTES # BLD: 20 % (ref 24–43)
MCH RBC QN AUTO: 30.7 PG (ref 25.2–33.5)
MCHC RBC AUTO-ENTMCNC: 33.4 G/DL (ref 28.4–34.8)
MCV RBC AUTO: 91.9 FL (ref 82.6–102.9)
MONOCYTES # BLD: 9 % (ref 3–12)
NRBC AUTOMATED: 0 PER 100 WBC
PDW BLD-RTO: 12.6 % (ref 11.8–14.4)
PLATELET # BLD: 394 K/UL (ref 138–453)
PMV BLD AUTO: 10.5 FL (ref 8.1–13.5)
POTASSIUM SERPL-SCNC: 4.6 MMOL/L (ref 3.7–5.3)
RBC # BLD: 4.79 M/UL (ref 3.95–5.11)
SEG NEUTROPHILS: 67 % (ref 36–65)
SEGMENTED NEUTROPHILS ABSOLUTE COUNT: 6.57 K/UL (ref 1.5–8.1)
SODIUM BLD-SCNC: 141 MMOL/L (ref 135–144)
TOTAL PROTEIN: 7.8 G/DL (ref 6.4–8.3)
WBC # BLD: 9.8 K/UL (ref 3.5–11.3)

## 2022-09-26 PROCEDURE — 83036 HEMOGLOBIN GLYCOSYLATED A1C: CPT

## 2022-09-26 PROCEDURE — 6360000002 HC RX W HCPCS: Performed by: NURSE PRACTITIONER

## 2022-09-26 PROCEDURE — 85025 COMPLETE CBC W/AUTO DIFF WBC: CPT

## 2022-09-26 PROCEDURE — 96365 THER/PROPH/DIAG IV INF INIT: CPT

## 2022-09-26 PROCEDURE — 36415 COLL VENOUS BLD VENIPUNCTURE: CPT

## 2022-09-26 PROCEDURE — 80053 COMPREHEN METABOLIC PANEL: CPT

## 2022-09-26 PROCEDURE — 2580000003 HC RX 258: Performed by: NURSE PRACTITIONER

## 2022-09-26 RX ORDER — DIPHENHYDRAMINE HYDROCHLORIDE 50 MG/ML
50 INJECTION INTRAMUSCULAR; INTRAVENOUS
Status: CANCELLED | OUTPATIENT
Start: 2022-09-27

## 2022-09-26 RX ORDER — ONDANSETRON 2 MG/ML
8 INJECTION INTRAMUSCULAR; INTRAVENOUS
Status: CANCELLED | OUTPATIENT
Start: 2022-09-26

## 2022-09-26 RX ORDER — SODIUM CHLORIDE 9 MG/ML
5-250 INJECTION, SOLUTION INTRAVENOUS PRN
Status: CANCELLED | OUTPATIENT
Start: 2022-09-27

## 2022-09-26 RX ORDER — SODIUM CHLORIDE 0.9 % (FLUSH) 0.9 %
5-40 SYRINGE (ML) INJECTION PRN
Status: CANCELLED | OUTPATIENT
Start: 2022-09-27

## 2022-09-26 RX ORDER — SODIUM CHLORIDE 0.9 % (FLUSH) 0.9 %
5-40 SYRINGE (ML) INJECTION PRN
Status: DISCONTINUED | OUTPATIENT
Start: 2022-09-26 | End: 2022-09-27 | Stop reason: HOSPADM

## 2022-09-26 RX ORDER — EPINEPHRINE 1 MG/ML
0.3 INJECTION, SOLUTION, CONCENTRATE INTRAVENOUS PRN
Status: CANCELLED | OUTPATIENT
Start: 2022-09-26

## 2022-09-26 RX ORDER — SODIUM CHLORIDE 0.9 % (FLUSH) 0.9 %
5-40 SYRINGE (ML) INJECTION PRN
Status: CANCELLED | OUTPATIENT
Start: 2022-09-26

## 2022-09-26 RX ORDER — DIPHENHYDRAMINE HYDROCHLORIDE 50 MG/ML
50 INJECTION INTRAMUSCULAR; INTRAVENOUS
Status: CANCELLED | OUTPATIENT
Start: 2022-09-26

## 2022-09-26 RX ORDER — ACETAMINOPHEN 325 MG/1
650 TABLET ORAL
Status: CANCELLED | OUTPATIENT
Start: 2022-09-27

## 2022-09-26 RX ORDER — ONDANSETRON 2 MG/ML
8 INJECTION INTRAMUSCULAR; INTRAVENOUS
Status: CANCELLED | OUTPATIENT
Start: 2022-09-27

## 2022-09-26 RX ORDER — ALBUTEROL SULFATE 90 UG/1
4 AEROSOL, METERED RESPIRATORY (INHALATION) PRN
Status: CANCELLED | OUTPATIENT
Start: 2022-09-26

## 2022-09-26 RX ORDER — ALBUTEROL SULFATE 90 UG/1
4 AEROSOL, METERED RESPIRATORY (INHALATION) PRN
Status: CANCELLED | OUTPATIENT
Start: 2022-09-27

## 2022-09-26 RX ORDER — SODIUM CHLORIDE 9 MG/ML
INJECTION, SOLUTION INTRAVENOUS CONTINUOUS
Status: CANCELLED | OUTPATIENT
Start: 2022-09-26

## 2022-09-26 RX ORDER — ACETAMINOPHEN 325 MG/1
650 TABLET ORAL
Status: CANCELLED | OUTPATIENT
Start: 2022-09-26

## 2022-09-26 RX ORDER — EPINEPHRINE 1 MG/ML
0.3 INJECTION, SOLUTION, CONCENTRATE INTRAVENOUS PRN
Status: CANCELLED | OUTPATIENT
Start: 2022-09-27

## 2022-09-26 RX ORDER — SODIUM CHLORIDE 9 MG/ML
5-250 INJECTION, SOLUTION INTRAVENOUS PRN
Status: CANCELLED | OUTPATIENT
Start: 2022-09-26

## 2022-09-26 RX ORDER — SODIUM CHLORIDE 9 MG/ML
INJECTION, SOLUTION INTRAVENOUS CONTINUOUS
Status: CANCELLED | OUTPATIENT
Start: 2022-09-27

## 2022-09-26 RX ADMIN — SODIUM CHLORIDE, PRESERVATIVE FREE 10 ML: 5 INJECTION INTRAVENOUS at 16:05

## 2022-09-26 RX ADMIN — SODIUM CHLORIDE, PRESERVATIVE FREE 20 ML: 5 INJECTION INTRAVENOUS at 16:38

## 2022-09-26 RX ADMIN — ERTAPENEM SODIUM 1000 MG: 1 INJECTION, POWDER, LYOPHILIZED, FOR SOLUTION INTRAMUSCULAR; INTRAVENOUS at 16:07

## 2022-09-26 NOTE — TELEPHONE ENCOUNTER
From: Rona Cheadle  To:  Negrito Head  Sent: 9/26/2022 7:44 AM EDT  Subject: Question regarding CULTURE, URINE    What does this test mean

## 2022-09-26 NOTE — DISCHARGE INSTRUCTIONS
Outpatient  Discharge Instructions for IV Therapy       [address] [address] [facility]  [phone]        You are advised to carry out the following instructions:      Diet:  As prescribed by your physician. Activity:  As prescribed by your physician. Saline Lock:   Notify your Physician if your previous IV site becomes,red,sore,swollen,painful, have drainage,or you develop a fever. Other:  If you develop hives,rash,itching or trouble breathing, go to the nearest Emergency Room. These could be signs of a allergic reaction to the medication. Keep the IV site covered with a bandage. Keep it clean and dry until healed. Follow up appointment:            ANY PROBLEMS OR CONCERNS NOTIFY YOUR PHYSICIAN  OR   GO TO THE NEAREST EMERGENCY ROOM.

## 2022-09-26 NOTE — TELEPHONE ENCOUNTER
Patient sent [employer] message in regards to the recent results from urine culture complete 09/22/22.

## 2022-09-26 NOTE — TELEPHONE ENCOUNTER
Patient notified of positive urine culture and for the need to have IV antibiotics for treatment. Patient also aware to have labs prior to antibiotics starting and is agreeable to scheduling CT and follow up appt. Order for antibiotics faxed to out patient. Also spoke with outpatient office about IV/lab orders. They will be in contact with patient to schedule.

## 2022-09-26 NOTE — TELEPHONE ENCOUNTER
Urine culture is positive for infection. The only option to treat this infection is IV antibiotics. You will need to go to outpatient infusion once per day for antibiotics for 14 days. Prior to starting antibiotics we will need you to get labs. Due to multidrug resistance we need to obtain a CT scan.     Follow-up in the office in 4 weeks

## 2022-09-27 ENCOUNTER — HOSPITAL ENCOUNTER (OUTPATIENT)
Dept: INFUSION THERAPY | Age: 57
Discharge: HOME | End: 2022-09-27
Payer: COMMERCIAL

## 2022-09-27 VITALS
OXYGEN SATURATION: 98 % | RESPIRATION RATE: 16 BRPM | HEART RATE: 76 BPM | DIASTOLIC BLOOD PRESSURE: 85 MMHG | SYSTOLIC BLOOD PRESSURE: 160 MMHG | TEMPERATURE: 98.2 F

## 2022-09-27 DIAGNOSIS — N39.0 URINARY TRACT INFECTION DUE TO ESBL KLEBSIELLA: Primary | ICD-10-CM

## 2022-09-27 DIAGNOSIS — B96.89 URINARY TRACT INFECTION DUE TO ESBL KLEBSIELLA: Primary | ICD-10-CM

## 2022-09-27 LAB
ESTIMATED AVERAGE GLUCOSE: 114 MG/DL
HBA1C MFR BLD: 5.6 % (ref 4–6)

## 2022-09-27 PROCEDURE — 6360000002 HC RX W HCPCS: Performed by: NURSE PRACTITIONER

## 2022-09-27 PROCEDURE — 96365 THER/PROPH/DIAG IV INF INIT: CPT

## 2022-09-27 PROCEDURE — 2580000003 HC RX 258: Performed by: NURSE PRACTITIONER

## 2022-09-27 RX ORDER — EPINEPHRINE 1 MG/ML
0.3 INJECTION, SOLUTION, CONCENTRATE INTRAVENOUS PRN
Status: CANCELLED | OUTPATIENT
Start: 2022-09-28

## 2022-09-27 RX ORDER — SODIUM CHLORIDE 0.9 % (FLUSH) 0.9 %
5-40 SYRINGE (ML) INJECTION PRN
Status: CANCELLED | OUTPATIENT
Start: 2022-09-28

## 2022-09-27 RX ORDER — SODIUM CHLORIDE 0.9 % (FLUSH) 0.9 %
5-40 SYRINGE (ML) INJECTION PRN
Status: DISCONTINUED | OUTPATIENT
Start: 2022-09-27 | End: 2022-09-28 | Stop reason: HOSPADM

## 2022-09-27 RX ORDER — ALBUTEROL SULFATE 90 UG/1
4 AEROSOL, METERED RESPIRATORY (INHALATION) PRN
Status: CANCELLED | OUTPATIENT
Start: 2022-09-28

## 2022-09-27 RX ORDER — DIPHENHYDRAMINE HYDROCHLORIDE 50 MG/ML
50 INJECTION INTRAMUSCULAR; INTRAVENOUS
Status: CANCELLED | OUTPATIENT
Start: 2022-09-28

## 2022-09-27 RX ORDER — ACETAMINOPHEN 325 MG/1
650 TABLET ORAL
Status: CANCELLED | OUTPATIENT
Start: 2022-09-28

## 2022-09-27 RX ORDER — SODIUM CHLORIDE 9 MG/ML
INJECTION, SOLUTION INTRAVENOUS CONTINUOUS
Status: CANCELLED | OUTPATIENT
Start: 2022-09-28

## 2022-09-27 RX ORDER — SODIUM CHLORIDE 9 MG/ML
5-250 INJECTION, SOLUTION INTRAVENOUS PRN
Status: CANCELLED | OUTPATIENT
Start: 2022-09-28

## 2022-09-27 RX ORDER — ONDANSETRON 2 MG/ML
8 INJECTION INTRAMUSCULAR; INTRAVENOUS
Status: CANCELLED | OUTPATIENT
Start: 2022-09-28

## 2022-09-27 RX ADMIN — SODIUM CHLORIDE, PRESERVATIVE FREE 10 ML: 5 INJECTION INTRAVENOUS at 15:40

## 2022-09-27 RX ADMIN — ERTAPENEM SODIUM 1000 MG: 1 INJECTION, POWDER, LYOPHILIZED, FOR SOLUTION INTRAMUSCULAR; INTRAVENOUS at 15:42

## 2022-09-27 RX ADMIN — SODIUM CHLORIDE, PRESERVATIVE FREE 10 ML: 5 INJECTION INTRAVENOUS at 16:15

## 2022-09-28 ENCOUNTER — HOSPITAL ENCOUNTER (OUTPATIENT)
Dept: INFUSION THERAPY | Age: 57
Discharge: HOME | End: 2022-09-28
Payer: COMMERCIAL

## 2022-09-28 VITALS
RESPIRATION RATE: 20 BRPM | TEMPERATURE: 96.3 F | DIASTOLIC BLOOD PRESSURE: 85 MMHG | HEART RATE: 84 BPM | SYSTOLIC BLOOD PRESSURE: 166 MMHG

## 2022-09-28 DIAGNOSIS — B96.89 URINARY TRACT INFECTION DUE TO ESBL KLEBSIELLA: Primary | ICD-10-CM

## 2022-09-28 DIAGNOSIS — N39.0 URINARY TRACT INFECTION DUE TO ESBL KLEBSIELLA: Primary | ICD-10-CM

## 2022-09-28 PROCEDURE — 6360000002 HC RX W HCPCS: Performed by: NURSE PRACTITIONER

## 2022-09-28 PROCEDURE — 96365 THER/PROPH/DIAG IV INF INIT: CPT

## 2022-09-28 PROCEDURE — 2580000003 HC RX 258: Performed by: NURSE PRACTITIONER

## 2022-09-28 RX ORDER — SODIUM CHLORIDE 0.9 % (FLUSH) 0.9 %
5-40 SYRINGE (ML) INJECTION PRN
Status: CANCELLED | OUTPATIENT
Start: 2022-09-29

## 2022-09-28 RX ORDER — ALBUTEROL SULFATE 90 UG/1
4 AEROSOL, METERED RESPIRATORY (INHALATION) PRN
Status: CANCELLED | OUTPATIENT
Start: 2022-09-29

## 2022-09-28 RX ORDER — ONDANSETRON 2 MG/ML
8 INJECTION INTRAMUSCULAR; INTRAVENOUS
Status: CANCELLED | OUTPATIENT
Start: 2022-09-29

## 2022-09-28 RX ORDER — SODIUM CHLORIDE 0.9 % (FLUSH) 0.9 %
5-40 SYRINGE (ML) INJECTION PRN
Status: DISCONTINUED | OUTPATIENT
Start: 2022-09-28 | End: 2022-09-29 | Stop reason: HOSPADM

## 2022-09-28 RX ORDER — DIPHENHYDRAMINE HYDROCHLORIDE 50 MG/ML
50 INJECTION INTRAMUSCULAR; INTRAVENOUS
Status: CANCELLED | OUTPATIENT
Start: 2022-09-29

## 2022-09-28 RX ORDER — SODIUM CHLORIDE 9 MG/ML
5-250 INJECTION, SOLUTION INTRAVENOUS PRN
Status: CANCELLED | OUTPATIENT
Start: 2022-09-29

## 2022-09-28 RX ORDER — ACETAMINOPHEN 325 MG/1
650 TABLET ORAL
Status: CANCELLED | OUTPATIENT
Start: 2022-09-29

## 2022-09-28 RX ORDER — EPINEPHRINE 1 MG/ML
0.3 INJECTION, SOLUTION, CONCENTRATE INTRAVENOUS PRN
Status: CANCELLED | OUTPATIENT
Start: 2022-09-29

## 2022-09-28 RX ORDER — SODIUM CHLORIDE 9 MG/ML
INJECTION, SOLUTION INTRAVENOUS CONTINUOUS
Status: CANCELLED | OUTPATIENT
Start: 2022-09-29

## 2022-09-28 RX ADMIN — SODIUM CHLORIDE, PRESERVATIVE FREE 10 ML: 5 INJECTION INTRAVENOUS at 15:42

## 2022-09-28 RX ADMIN — ERTAPENEM SODIUM 1000 MG: 1 INJECTION, POWDER, LYOPHILIZED, FOR SOLUTION INTRAMUSCULAR; INTRAVENOUS at 15:43

## 2022-09-28 RX ADMIN — SODIUM CHLORIDE, PRESERVATIVE FREE 10 ML: 5 INJECTION INTRAVENOUS at 16:16

## 2022-09-29 ENCOUNTER — HOSPITAL ENCOUNTER (OUTPATIENT)
Dept: INFUSION THERAPY | Age: 57
Discharge: HOME | End: 2022-09-29
Payer: COMMERCIAL

## 2022-09-29 VITALS
HEART RATE: 81 BPM | TEMPERATURE: 97.4 F | SYSTOLIC BLOOD PRESSURE: 160 MMHG | DIASTOLIC BLOOD PRESSURE: 77 MMHG | RESPIRATION RATE: 16 BRPM

## 2022-09-29 DIAGNOSIS — B96.89 URINARY TRACT INFECTION DUE TO ESBL KLEBSIELLA: Primary | ICD-10-CM

## 2022-09-29 DIAGNOSIS — N39.0 URINARY TRACT INFECTION DUE TO ESBL KLEBSIELLA: Primary | ICD-10-CM

## 2022-09-29 PROCEDURE — 96365 THER/PROPH/DIAG IV INF INIT: CPT

## 2022-09-29 PROCEDURE — 2580000003 HC RX 258: Performed by: NURSE PRACTITIONER

## 2022-09-29 PROCEDURE — 6360000002 HC RX W HCPCS: Performed by: NURSE PRACTITIONER

## 2022-09-29 RX ORDER — ONDANSETRON 2 MG/ML
8 INJECTION INTRAMUSCULAR; INTRAVENOUS
Status: CANCELLED | OUTPATIENT
Start: 2022-09-30

## 2022-09-29 RX ORDER — SODIUM CHLORIDE 9 MG/ML
5-250 INJECTION, SOLUTION INTRAVENOUS PRN
Status: CANCELLED | OUTPATIENT
Start: 2022-09-30

## 2022-09-29 RX ORDER — ACETAMINOPHEN 325 MG/1
650 TABLET ORAL
Status: CANCELLED | OUTPATIENT
Start: 2022-09-30

## 2022-09-29 RX ORDER — ALBUTEROL SULFATE 90 UG/1
4 AEROSOL, METERED RESPIRATORY (INHALATION) PRN
Status: CANCELLED | OUTPATIENT
Start: 2022-09-30

## 2022-09-29 RX ORDER — DIPHENHYDRAMINE HYDROCHLORIDE 50 MG/ML
50 INJECTION INTRAMUSCULAR; INTRAVENOUS
Status: CANCELLED | OUTPATIENT
Start: 2022-09-30

## 2022-09-29 RX ORDER — SODIUM CHLORIDE 9 MG/ML
INJECTION, SOLUTION INTRAVENOUS CONTINUOUS
Status: CANCELLED | OUTPATIENT
Start: 2022-09-30

## 2022-09-29 RX ORDER — SODIUM CHLORIDE 0.9 % (FLUSH) 0.9 %
5-40 SYRINGE (ML) INJECTION PRN
Status: CANCELLED | OUTPATIENT
Start: 2022-09-30

## 2022-09-29 RX ORDER — SODIUM CHLORIDE 0.9 % (FLUSH) 0.9 %
5-40 SYRINGE (ML) INJECTION PRN
Status: DISCONTINUED | OUTPATIENT
Start: 2022-09-29 | End: 2022-09-30 | Stop reason: HOSPADM

## 2022-09-29 RX ORDER — EPINEPHRINE 1 MG/ML
0.3 INJECTION, SOLUTION, CONCENTRATE INTRAVENOUS PRN
Status: CANCELLED | OUTPATIENT
Start: 2022-09-30

## 2022-09-29 RX ADMIN — SODIUM CHLORIDE, PRESERVATIVE FREE 10 ML: 5 INJECTION INTRAVENOUS at 15:45

## 2022-09-29 RX ADMIN — SODIUM CHLORIDE, PRESERVATIVE FREE 10 ML: 5 INJECTION INTRAVENOUS at 16:20

## 2022-09-29 RX ADMIN — ERTAPENEM SODIUM 1000 MG: 1 INJECTION, POWDER, LYOPHILIZED, FOR SOLUTION INTRAMUSCULAR; INTRAVENOUS at 15:49

## 2022-09-30 ENCOUNTER — HOSPITAL ENCOUNTER (OUTPATIENT)
Dept: INFUSION THERAPY | Age: 57
Discharge: HOME | End: 2022-09-30
Payer: COMMERCIAL

## 2022-09-30 VITALS
SYSTOLIC BLOOD PRESSURE: 157 MMHG | HEART RATE: 82 BPM | RESPIRATION RATE: 20 BRPM | DIASTOLIC BLOOD PRESSURE: 80 MMHG | TEMPERATURE: 97.6 F

## 2022-09-30 DIAGNOSIS — B96.89 URINARY TRACT INFECTION DUE TO ESBL KLEBSIELLA: Primary | ICD-10-CM

## 2022-09-30 DIAGNOSIS — N39.0 URINARY TRACT INFECTION DUE TO ESBL KLEBSIELLA: Primary | ICD-10-CM

## 2022-09-30 PROCEDURE — 6360000002 HC RX W HCPCS: Performed by: NURSE PRACTITIONER

## 2022-09-30 PROCEDURE — 2580000003 HC RX 258: Performed by: NURSE PRACTITIONER

## 2022-09-30 PROCEDURE — 96365 THER/PROPH/DIAG IV INF INIT: CPT

## 2022-09-30 RX ORDER — SODIUM CHLORIDE 9 MG/ML
INJECTION, SOLUTION INTRAVENOUS CONTINUOUS
Status: CANCELLED | OUTPATIENT
Start: 2022-10-01

## 2022-09-30 RX ORDER — ACETAMINOPHEN 325 MG/1
650 TABLET ORAL
Status: CANCELLED | OUTPATIENT
Start: 2022-10-01

## 2022-09-30 RX ORDER — DIPHENHYDRAMINE HYDROCHLORIDE 50 MG/ML
50 INJECTION INTRAMUSCULAR; INTRAVENOUS
Status: CANCELLED | OUTPATIENT
Start: 2022-10-01

## 2022-09-30 RX ORDER — SODIUM CHLORIDE 0.9 % (FLUSH) 0.9 %
5-40 SYRINGE (ML) INJECTION PRN
Status: DISCONTINUED | OUTPATIENT
Start: 2022-09-30 | End: 2022-10-01 | Stop reason: HOSPADM

## 2022-09-30 RX ORDER — ONDANSETRON 2 MG/ML
8 INJECTION INTRAMUSCULAR; INTRAVENOUS
Status: CANCELLED | OUTPATIENT
Start: 2022-10-01

## 2022-09-30 RX ORDER — SODIUM CHLORIDE 0.9 % (FLUSH) 0.9 %
5-40 SYRINGE (ML) INJECTION PRN
Status: CANCELLED | OUTPATIENT
Start: 2022-10-01

## 2022-09-30 RX ORDER — SODIUM CHLORIDE 9 MG/ML
5-250 INJECTION, SOLUTION INTRAVENOUS PRN
Status: CANCELLED | OUTPATIENT
Start: 2022-10-01

## 2022-09-30 RX ORDER — ALBUTEROL SULFATE 90 UG/1
4 AEROSOL, METERED RESPIRATORY (INHALATION) PRN
Status: CANCELLED | OUTPATIENT
Start: 2022-10-01

## 2022-09-30 RX ORDER — EPINEPHRINE 1 MG/ML
0.3 INJECTION, SOLUTION, CONCENTRATE INTRAVENOUS PRN
Status: CANCELLED | OUTPATIENT
Start: 2022-10-01

## 2022-09-30 RX ADMIN — ERTAPENEM SODIUM 1000 MG: 1 INJECTION, POWDER, LYOPHILIZED, FOR SOLUTION INTRAMUSCULAR; INTRAVENOUS at 15:26

## 2022-09-30 RX ADMIN — SODIUM CHLORIDE, PRESERVATIVE FREE 10 ML: 5 INJECTION INTRAVENOUS at 15:24

## 2022-09-30 RX ADMIN — SODIUM CHLORIDE, PRESERVATIVE FREE 10 ML: 5 INJECTION INTRAVENOUS at 16:03

## 2022-10-01 ENCOUNTER — HOSPITAL ENCOUNTER (OUTPATIENT)
Dept: INFUSION THERAPY | Age: 57
Discharge: HOME | End: 2022-10-01
Payer: COMMERCIAL

## 2022-10-01 VITALS
OXYGEN SATURATION: 99 % | HEART RATE: 85 BPM | RESPIRATION RATE: 18 BRPM | DIASTOLIC BLOOD PRESSURE: 72 MMHG | TEMPERATURE: 97.7 F | SYSTOLIC BLOOD PRESSURE: 120 MMHG

## 2022-10-01 DIAGNOSIS — B96.89 URINARY TRACT INFECTION DUE TO ESBL KLEBSIELLA: Primary | ICD-10-CM

## 2022-10-01 DIAGNOSIS — N39.0 URINARY TRACT INFECTION DUE TO ESBL KLEBSIELLA: Primary | ICD-10-CM

## 2022-10-01 PROCEDURE — 96365 THER/PROPH/DIAG IV INF INIT: CPT

## 2022-10-01 PROCEDURE — 2580000003 HC RX 258: Performed by: NURSE PRACTITIONER

## 2022-10-01 PROCEDURE — 6360000002 HC RX W HCPCS: Performed by: NURSE PRACTITIONER

## 2022-10-01 RX ORDER — DIPHENHYDRAMINE HYDROCHLORIDE 50 MG/ML
50 INJECTION INTRAMUSCULAR; INTRAVENOUS
Status: CANCELLED | OUTPATIENT
Start: 2022-10-02

## 2022-10-01 RX ORDER — ALBUTEROL SULFATE 90 UG/1
4 AEROSOL, METERED RESPIRATORY (INHALATION) PRN
Status: CANCELLED | OUTPATIENT
Start: 2022-10-02

## 2022-10-01 RX ORDER — ONDANSETRON 2 MG/ML
8 INJECTION INTRAMUSCULAR; INTRAVENOUS
Status: CANCELLED | OUTPATIENT
Start: 2022-10-02

## 2022-10-01 RX ORDER — SODIUM CHLORIDE 9 MG/ML
5-250 INJECTION, SOLUTION INTRAVENOUS PRN
Status: CANCELLED | OUTPATIENT
Start: 2022-10-02

## 2022-10-01 RX ORDER — ACETAMINOPHEN 325 MG/1
650 TABLET ORAL
Status: CANCELLED | OUTPATIENT
Start: 2022-10-02

## 2022-10-01 RX ORDER — SODIUM CHLORIDE 0.9 % (FLUSH) 0.9 %
5-40 SYRINGE (ML) INJECTION PRN
Status: DISCONTINUED | OUTPATIENT
Start: 2022-10-01 | End: 2022-10-02 | Stop reason: HOSPADM

## 2022-10-01 RX ORDER — EPINEPHRINE 1 MG/ML
0.3 INJECTION, SOLUTION, CONCENTRATE INTRAVENOUS PRN
Status: CANCELLED | OUTPATIENT
Start: 2022-10-02

## 2022-10-01 RX ORDER — SODIUM CHLORIDE 9 MG/ML
INJECTION, SOLUTION INTRAVENOUS CONTINUOUS
Status: CANCELLED | OUTPATIENT
Start: 2022-10-02

## 2022-10-01 RX ORDER — SODIUM CHLORIDE 0.9 % (FLUSH) 0.9 %
5-40 SYRINGE (ML) INJECTION PRN
Status: CANCELLED | OUTPATIENT
Start: 2022-10-02

## 2022-10-01 RX ADMIN — SODIUM CHLORIDE, PRESERVATIVE FREE 10 ML: 5 INJECTION INTRAVENOUS at 17:41

## 2022-10-01 RX ADMIN — ERTAPENEM SODIUM 1000 MG: 1 INJECTION, POWDER, LYOPHILIZED, FOR SOLUTION INTRAMUSCULAR; INTRAVENOUS at 17:10

## 2022-10-01 RX ADMIN — SODIUM CHLORIDE, PRESERVATIVE FREE 10 ML: 5 INJECTION INTRAVENOUS at 17:07

## 2022-10-02 ENCOUNTER — HOSPITAL ENCOUNTER (OUTPATIENT)
Dept: INFUSION THERAPY | Age: 57
Discharge: HOME | End: 2022-10-02
Payer: COMMERCIAL

## 2022-10-02 VITALS
TEMPERATURE: 96.1 F | HEART RATE: 83 BPM | RESPIRATION RATE: 18 BRPM | OXYGEN SATURATION: 98 % | DIASTOLIC BLOOD PRESSURE: 68 MMHG | SYSTOLIC BLOOD PRESSURE: 149 MMHG

## 2022-10-02 DIAGNOSIS — N39.0 URINARY TRACT INFECTION DUE TO ESBL KLEBSIELLA: Primary | ICD-10-CM

## 2022-10-02 DIAGNOSIS — B96.89 URINARY TRACT INFECTION DUE TO ESBL KLEBSIELLA: Primary | ICD-10-CM

## 2022-10-02 PROCEDURE — 2580000003 HC RX 258: Performed by: NURSE PRACTITIONER

## 2022-10-02 PROCEDURE — 6360000002 HC RX W HCPCS: Performed by: NURSE PRACTITIONER

## 2022-10-02 PROCEDURE — 96365 THER/PROPH/DIAG IV INF INIT: CPT

## 2022-10-02 RX ORDER — ALBUTEROL SULFATE 90 UG/1
4 AEROSOL, METERED RESPIRATORY (INHALATION) PRN
Status: CANCELLED | OUTPATIENT
Start: 2022-10-03

## 2022-10-02 RX ORDER — SODIUM CHLORIDE 9 MG/ML
5-250 INJECTION, SOLUTION INTRAVENOUS PRN
Status: CANCELLED | OUTPATIENT
Start: 2022-10-03

## 2022-10-02 RX ORDER — ACETAMINOPHEN 325 MG/1
650 TABLET ORAL
Status: CANCELLED | OUTPATIENT
Start: 2022-10-03

## 2022-10-02 RX ORDER — DIPHENHYDRAMINE HYDROCHLORIDE 50 MG/ML
50 INJECTION INTRAMUSCULAR; INTRAVENOUS
Status: CANCELLED | OUTPATIENT
Start: 2022-10-03

## 2022-10-02 RX ORDER — SODIUM CHLORIDE 0.9 % (FLUSH) 0.9 %
5-40 SYRINGE (ML) INJECTION PRN
Status: CANCELLED | OUTPATIENT
Start: 2022-10-03

## 2022-10-02 RX ORDER — SODIUM CHLORIDE 0.9 % (FLUSH) 0.9 %
5-40 SYRINGE (ML) INJECTION PRN
Status: DISCONTINUED | OUTPATIENT
Start: 2022-10-02 | End: 2022-10-03 | Stop reason: HOSPADM

## 2022-10-02 RX ORDER — SODIUM CHLORIDE 9 MG/ML
INJECTION, SOLUTION INTRAVENOUS CONTINUOUS
Status: CANCELLED | OUTPATIENT
Start: 2022-10-03

## 2022-10-02 RX ORDER — ONDANSETRON 2 MG/ML
8 INJECTION INTRAMUSCULAR; INTRAVENOUS
Status: CANCELLED | OUTPATIENT
Start: 2022-10-03

## 2022-10-02 RX ORDER — EPINEPHRINE 1 MG/ML
0.3 INJECTION, SOLUTION, CONCENTRATE INTRAVENOUS PRN
Status: CANCELLED | OUTPATIENT
Start: 2022-10-03

## 2022-10-02 RX ADMIN — SODIUM CHLORIDE, PRESERVATIVE FREE 10 ML: 5 INJECTION INTRAVENOUS at 16:07

## 2022-10-02 RX ADMIN — ERTAPENEM SODIUM 1000 MG: 1 INJECTION, POWDER, LYOPHILIZED, FOR SOLUTION INTRAMUSCULAR; INTRAVENOUS at 16:10

## 2022-10-02 RX ADMIN — SODIUM CHLORIDE, PRESERVATIVE FREE 10 ML: 5 INJECTION INTRAVENOUS at 16:41

## 2022-10-02 NOTE — PLAN OF CARE
Tolerated IV antibiotic infusion well. Saline lock flushed with normal saline and alcohol cap reapplied. Coban reapplied loosely. Ambulated out of department.  Sterling Gann RN

## 2022-10-02 NOTE — PLAN OF CARE
IV antibiotic infusion completed. Tolerated well. Saline lock secured loosely with Coban. Ambulated out of department.  Marshal Reid RN

## 2022-10-03 ENCOUNTER — HOSPITAL ENCOUNTER (OUTPATIENT)
Dept: INFUSION THERAPY | Age: 57
Discharge: HOME | End: 2022-10-03
Payer: COMMERCIAL

## 2022-10-03 VITALS — SYSTOLIC BLOOD PRESSURE: 147 MMHG | DIASTOLIC BLOOD PRESSURE: 60 MMHG | TEMPERATURE: 97.7 F | RESPIRATION RATE: 18 BRPM

## 2022-10-03 DIAGNOSIS — N39.0 URINARY TRACT INFECTION DUE TO ESBL KLEBSIELLA: Primary | ICD-10-CM

## 2022-10-03 DIAGNOSIS — B96.89 URINARY TRACT INFECTION DUE TO ESBL KLEBSIELLA: Primary | ICD-10-CM

## 2022-10-03 PROCEDURE — 6360000002 HC RX W HCPCS: Performed by: NURSE PRACTITIONER

## 2022-10-03 PROCEDURE — 2580000003 HC RX 258: Performed by: NURSE PRACTITIONER

## 2022-10-03 PROCEDURE — 96365 THER/PROPH/DIAG IV INF INIT: CPT

## 2022-10-03 RX ORDER — SODIUM CHLORIDE 9 MG/ML
INJECTION, SOLUTION INTRAVENOUS CONTINUOUS
Status: CANCELLED | OUTPATIENT
Start: 2022-10-04

## 2022-10-03 RX ORDER — DIPHENHYDRAMINE HYDROCHLORIDE 50 MG/ML
50 INJECTION INTRAMUSCULAR; INTRAVENOUS
Status: CANCELLED | OUTPATIENT
Start: 2022-10-04

## 2022-10-03 RX ORDER — ACETAMINOPHEN 325 MG/1
650 TABLET ORAL
Status: CANCELLED | OUTPATIENT
Start: 2022-10-04

## 2022-10-03 RX ORDER — EPINEPHRINE 1 MG/ML
0.3 INJECTION, SOLUTION, CONCENTRATE INTRAVENOUS PRN
Status: CANCELLED | OUTPATIENT
Start: 2022-10-04

## 2022-10-03 RX ORDER — SODIUM CHLORIDE 0.9 % (FLUSH) 0.9 %
5-40 SYRINGE (ML) INJECTION PRN
Status: CANCELLED | OUTPATIENT
Start: 2022-10-04

## 2022-10-03 RX ORDER — ONDANSETRON 2 MG/ML
8 INJECTION INTRAMUSCULAR; INTRAVENOUS
Status: CANCELLED | OUTPATIENT
Start: 2022-10-04

## 2022-10-03 RX ORDER — ALBUTEROL SULFATE 90 UG/1
4 AEROSOL, METERED RESPIRATORY (INHALATION) PRN
Status: CANCELLED | OUTPATIENT
Start: 2022-10-04

## 2022-10-03 RX ORDER — SODIUM CHLORIDE 0.9 % (FLUSH) 0.9 %
5-40 SYRINGE (ML) INJECTION PRN
Status: DISCONTINUED | OUTPATIENT
Start: 2022-10-03 | End: 2022-10-04 | Stop reason: HOSPADM

## 2022-10-03 RX ORDER — SODIUM CHLORIDE 9 MG/ML
5-250 INJECTION, SOLUTION INTRAVENOUS PRN
Status: CANCELLED | OUTPATIENT
Start: 2022-10-04

## 2022-10-03 RX ADMIN — SODIUM CHLORIDE, PRESERVATIVE FREE 10 ML: 5 INJECTION INTRAVENOUS at 15:45

## 2022-10-03 RX ADMIN — ERTAPENEM SODIUM 1000 MG: 1 INJECTION, POWDER, LYOPHILIZED, FOR SOLUTION INTRAMUSCULAR; INTRAVENOUS at 15:47

## 2022-10-04 ENCOUNTER — HOSPITAL ENCOUNTER (OUTPATIENT)
Dept: INFUSION THERAPY | Age: 57
Discharge: HOME | End: 2022-10-04
Payer: COMMERCIAL

## 2022-10-04 VITALS — DIASTOLIC BLOOD PRESSURE: 68 MMHG | RESPIRATION RATE: 20 BRPM | TEMPERATURE: 97.7 F | SYSTOLIC BLOOD PRESSURE: 148 MMHG

## 2022-10-04 DIAGNOSIS — N39.0 URINARY TRACT INFECTION DUE TO ESBL KLEBSIELLA: Primary | ICD-10-CM

## 2022-10-04 DIAGNOSIS — B96.89 URINARY TRACT INFECTION DUE TO ESBL KLEBSIELLA: Primary | ICD-10-CM

## 2022-10-04 PROCEDURE — 96365 THER/PROPH/DIAG IV INF INIT: CPT

## 2022-10-04 PROCEDURE — 2580000003 HC RX 258: Performed by: NURSE PRACTITIONER

## 2022-10-04 PROCEDURE — 6360000002 HC RX W HCPCS: Performed by: NURSE PRACTITIONER

## 2022-10-04 RX ORDER — ALBUTEROL SULFATE 90 UG/1
4 AEROSOL, METERED RESPIRATORY (INHALATION) PRN
Status: CANCELLED | OUTPATIENT
Start: 2022-10-05

## 2022-10-04 RX ORDER — SODIUM CHLORIDE 9 MG/ML
INJECTION, SOLUTION INTRAVENOUS CONTINUOUS
Status: CANCELLED | OUTPATIENT
Start: 2022-10-05

## 2022-10-04 RX ORDER — SODIUM CHLORIDE 0.9 % (FLUSH) 0.9 %
5-40 SYRINGE (ML) INJECTION PRN
Status: CANCELLED | OUTPATIENT
Start: 2022-10-05

## 2022-10-04 RX ORDER — ONDANSETRON 2 MG/ML
8 INJECTION INTRAMUSCULAR; INTRAVENOUS
Status: CANCELLED | OUTPATIENT
Start: 2022-10-05

## 2022-10-04 RX ORDER — SODIUM CHLORIDE 0.9 % (FLUSH) 0.9 %
5-40 SYRINGE (ML) INJECTION PRN
Status: DISCONTINUED | OUTPATIENT
Start: 2022-10-04 | End: 2022-10-05 | Stop reason: HOSPADM

## 2022-10-04 RX ORDER — DIPHENHYDRAMINE HYDROCHLORIDE 50 MG/ML
50 INJECTION INTRAMUSCULAR; INTRAVENOUS
Status: CANCELLED | OUTPATIENT
Start: 2022-10-05

## 2022-10-04 RX ORDER — ACETAMINOPHEN 325 MG/1
650 TABLET ORAL
Status: CANCELLED | OUTPATIENT
Start: 2022-10-05

## 2022-10-04 RX ORDER — SODIUM CHLORIDE 9 MG/ML
5-250 INJECTION, SOLUTION INTRAVENOUS PRN
Status: CANCELLED | OUTPATIENT
Start: 2022-10-05

## 2022-10-04 RX ORDER — EPINEPHRINE 1 MG/ML
0.3 INJECTION, SOLUTION, CONCENTRATE INTRAVENOUS PRN
Status: CANCELLED | OUTPATIENT
Start: 2022-10-05

## 2022-10-04 RX ADMIN — SODIUM CHLORIDE, PRESERVATIVE FREE 10 ML: 5 INJECTION INTRAVENOUS at 16:15

## 2022-10-04 RX ADMIN — SODIUM CHLORIDE, PRESERVATIVE FREE 10 ML: 5 INJECTION INTRAVENOUS at 15:42

## 2022-10-04 RX ADMIN — ERTAPENEM SODIUM 1000 MG: 1 INJECTION, POWDER, LYOPHILIZED, FOR SOLUTION INTRAMUSCULAR; INTRAVENOUS at 15:45

## 2022-10-05 ENCOUNTER — HOSPITAL ENCOUNTER (OUTPATIENT)
Dept: INFUSION THERAPY | Age: 57
Discharge: HOME | End: 2022-10-05
Payer: COMMERCIAL

## 2022-10-05 VITALS — TEMPERATURE: 97.4 F | SYSTOLIC BLOOD PRESSURE: 154 MMHG | RESPIRATION RATE: 18 BRPM | DIASTOLIC BLOOD PRESSURE: 85 MMHG

## 2022-10-05 DIAGNOSIS — B96.89 URINARY TRACT INFECTION DUE TO ESBL KLEBSIELLA: Primary | ICD-10-CM

## 2022-10-05 DIAGNOSIS — N39.0 URINARY TRACT INFECTION DUE TO ESBL KLEBSIELLA: Primary | ICD-10-CM

## 2022-10-05 PROCEDURE — 6360000002 HC RX W HCPCS: Performed by: NURSE PRACTITIONER

## 2022-10-05 PROCEDURE — 96365 THER/PROPH/DIAG IV INF INIT: CPT

## 2022-10-05 PROCEDURE — 2580000003 HC RX 258: Performed by: NURSE PRACTITIONER

## 2022-10-05 RX ORDER — ACETAMINOPHEN 325 MG/1
650 TABLET ORAL
Status: CANCELLED | OUTPATIENT
Start: 2022-10-06

## 2022-10-05 RX ORDER — ALBUTEROL SULFATE 90 UG/1
4 AEROSOL, METERED RESPIRATORY (INHALATION) PRN
Status: CANCELLED | OUTPATIENT
Start: 2022-10-06

## 2022-10-05 RX ORDER — SODIUM CHLORIDE 0.9 % (FLUSH) 0.9 %
5-40 SYRINGE (ML) INJECTION PRN
Status: DISCONTINUED | OUTPATIENT
Start: 2022-10-05 | End: 2022-10-06 | Stop reason: HOSPADM

## 2022-10-05 RX ORDER — SODIUM CHLORIDE 9 MG/ML
5-250 INJECTION, SOLUTION INTRAVENOUS PRN
Status: CANCELLED | OUTPATIENT
Start: 2022-10-06

## 2022-10-05 RX ORDER — SODIUM CHLORIDE 9 MG/ML
INJECTION, SOLUTION INTRAVENOUS CONTINUOUS
Status: CANCELLED | OUTPATIENT
Start: 2022-10-06

## 2022-10-05 RX ORDER — DIPHENHYDRAMINE HYDROCHLORIDE 50 MG/ML
50 INJECTION INTRAMUSCULAR; INTRAVENOUS
Status: CANCELLED | OUTPATIENT
Start: 2022-10-06

## 2022-10-05 RX ORDER — ONDANSETRON 2 MG/ML
8 INJECTION INTRAMUSCULAR; INTRAVENOUS
Status: CANCELLED | OUTPATIENT
Start: 2022-10-06

## 2022-10-05 RX ORDER — EPINEPHRINE 1 MG/ML
0.3 INJECTION, SOLUTION, CONCENTRATE INTRAVENOUS PRN
Status: CANCELLED | OUTPATIENT
Start: 2022-10-06

## 2022-10-05 RX ORDER — SODIUM CHLORIDE 0.9 % (FLUSH) 0.9 %
5-40 SYRINGE (ML) INJECTION PRN
Status: CANCELLED | OUTPATIENT
Start: 2022-10-06

## 2022-10-05 RX ADMIN — SODIUM CHLORIDE, PRESERVATIVE FREE 10 ML: 5 INJECTION INTRAVENOUS at 16:18

## 2022-10-05 RX ADMIN — ERTAPENEM SODIUM 1000 MG: 1 INJECTION, POWDER, LYOPHILIZED, FOR SOLUTION INTRAMUSCULAR; INTRAVENOUS at 15:47

## 2022-10-05 RX ADMIN — SODIUM CHLORIDE, PRESERVATIVE FREE 10 ML: 5 INJECTION INTRAVENOUS at 15:46

## 2022-10-06 ENCOUNTER — HOSPITAL ENCOUNTER (OUTPATIENT)
Dept: INFUSION THERAPY | Age: 57
Discharge: HOME | End: 2022-10-06
Payer: COMMERCIAL

## 2022-10-06 VITALS
RESPIRATION RATE: 16 BRPM | SYSTOLIC BLOOD PRESSURE: 155 MMHG | HEART RATE: 78 BPM | OXYGEN SATURATION: 96 % | DIASTOLIC BLOOD PRESSURE: 86 MMHG | TEMPERATURE: 98.3 F

## 2022-10-06 DIAGNOSIS — B96.89 URINARY TRACT INFECTION DUE TO ESBL KLEBSIELLA: Primary | ICD-10-CM

## 2022-10-06 DIAGNOSIS — N39.0 URINARY TRACT INFECTION DUE TO ESBL KLEBSIELLA: Primary | ICD-10-CM

## 2022-10-06 PROCEDURE — 2580000003 HC RX 258: Performed by: NURSE PRACTITIONER

## 2022-10-06 PROCEDURE — 96365 THER/PROPH/DIAG IV INF INIT: CPT

## 2022-10-06 PROCEDURE — 6360000002 HC RX W HCPCS: Performed by: NURSE PRACTITIONER

## 2022-10-06 RX ORDER — ALBUTEROL SULFATE 90 UG/1
4 AEROSOL, METERED RESPIRATORY (INHALATION) PRN
Status: CANCELLED | OUTPATIENT
Start: 2022-10-08

## 2022-10-06 RX ORDER — SODIUM CHLORIDE 9 MG/ML
INJECTION, SOLUTION INTRAVENOUS CONTINUOUS
Status: CANCELLED | OUTPATIENT
Start: 2022-10-08

## 2022-10-06 RX ORDER — DIPHENHYDRAMINE HYDROCHLORIDE 50 MG/ML
50 INJECTION INTRAMUSCULAR; INTRAVENOUS
Status: CANCELLED | OUTPATIENT
Start: 2022-10-07

## 2022-10-06 RX ORDER — ONDANSETRON 2 MG/ML
8 INJECTION INTRAMUSCULAR; INTRAVENOUS
Status: CANCELLED | OUTPATIENT
Start: 2022-10-08 | End: 2022-10-09

## 2022-10-06 RX ORDER — EPINEPHRINE 1 MG/ML
0.3 INJECTION, SOLUTION, CONCENTRATE INTRAVENOUS PRN
Status: CANCELLED | OUTPATIENT
Start: 2022-10-08

## 2022-10-06 RX ORDER — ACETAMINOPHEN 325 MG/1
650 TABLET ORAL
Status: CANCELLED | OUTPATIENT
Start: 2022-10-08 | End: 2022-10-09

## 2022-10-06 RX ORDER — SODIUM CHLORIDE 9 MG/ML
5-250 INJECTION, SOLUTION INTRAVENOUS PRN
Status: CANCELLED | OUTPATIENT
Start: 2022-10-07

## 2022-10-06 RX ORDER — SPIRONOLACTONE 25 MG/1
TABLET ORAL
Qty: 90 TABLET | Refills: 3 | Status: SHIPPED | OUTPATIENT
Start: 2022-10-06

## 2022-10-06 RX ORDER — SODIUM CHLORIDE 0.9 % (FLUSH) 0.9 %
5-40 SYRINGE (ML) INJECTION PRN
Status: CANCELLED | OUTPATIENT
Start: 2022-10-07

## 2022-10-06 RX ORDER — SODIUM CHLORIDE 9 MG/ML
5-250 INJECTION, SOLUTION INTRAVENOUS PRN
Status: DISCONTINUED | OUTPATIENT
Start: 2022-10-06 | End: 2022-10-07 | Stop reason: HOSPADM

## 2022-10-06 RX ORDER — SODIUM CHLORIDE 0.9 % (FLUSH) 0.9 %
5-40 SYRINGE (ML) INJECTION PRN
Status: DISCONTINUED | OUTPATIENT
Start: 2022-10-06 | End: 2022-10-07 | Stop reason: HOSPADM

## 2022-10-06 RX ADMIN — ERTAPENEM SODIUM 1000 MG: 1 INJECTION, POWDER, LYOPHILIZED, FOR SOLUTION INTRAMUSCULAR; INTRAVENOUS at 15:39

## 2022-10-06 RX ADMIN — SODIUM CHLORIDE, PRESERVATIVE FREE 10 ML: 5 INJECTION INTRAVENOUS at 16:04

## 2022-10-06 RX ADMIN — SODIUM CHLORIDE, PRESERVATIVE FREE 10 ML: 5 INJECTION INTRAVENOUS at 15:36

## 2022-10-06 NOTE — PROGRESS NOTES
Patient returned to department, stating that when she got in the elevator she felt something pull and felt fluid on her arm. This RN attempted to flush IV lock and it leaks around the insertion site. The dressing is removed and there is an obvious kink in the tubing. The IV lock continues to leak at the insertion site when flushed.  IV lock is removed

## 2022-10-07 ENCOUNTER — HOSPITAL ENCOUNTER (OUTPATIENT)
Dept: INFUSION THERAPY | Age: 57
Discharge: HOME | End: 2022-10-07
Payer: COMMERCIAL

## 2022-10-07 VITALS
DIASTOLIC BLOOD PRESSURE: 84 MMHG | SYSTOLIC BLOOD PRESSURE: 150 MMHG | HEART RATE: 78 BPM | RESPIRATION RATE: 16 BRPM | TEMPERATURE: 98 F

## 2022-10-07 DIAGNOSIS — N39.0 URINARY TRACT INFECTION DUE TO ESBL KLEBSIELLA: Primary | ICD-10-CM

## 2022-10-07 DIAGNOSIS — B96.89 URINARY TRACT INFECTION DUE TO ESBL KLEBSIELLA: Primary | ICD-10-CM

## 2022-10-07 PROCEDURE — 96365 THER/PROPH/DIAG IV INF INIT: CPT

## 2022-10-07 PROCEDURE — 6360000002 HC RX W HCPCS: Performed by: NURSE PRACTITIONER

## 2022-10-07 PROCEDURE — 2580000003 HC RX 258: Performed by: NURSE PRACTITIONER

## 2022-10-07 RX ORDER — ACETAMINOPHEN 325 MG/1
650 TABLET ORAL
Status: CANCELLED | OUTPATIENT
Start: 2022-10-08

## 2022-10-07 RX ORDER — SODIUM CHLORIDE 0.9 % (FLUSH) 0.9 %
5-40 SYRINGE (ML) INJECTION PRN
Status: DISCONTINUED | OUTPATIENT
Start: 2022-10-07 | End: 2022-10-08 | Stop reason: HOSPADM

## 2022-10-07 RX ORDER — ONDANSETRON 2 MG/ML
8 INJECTION INTRAMUSCULAR; INTRAVENOUS
Status: CANCELLED | OUTPATIENT
Start: 2022-10-08

## 2022-10-07 RX ORDER — ALBUTEROL SULFATE 90 UG/1
4 AEROSOL, METERED RESPIRATORY (INHALATION) PRN
Status: CANCELLED | OUTPATIENT
Start: 2022-10-08

## 2022-10-07 RX ORDER — EPINEPHRINE 1 MG/ML
0.3 INJECTION, SOLUTION, CONCENTRATE INTRAVENOUS PRN
Status: CANCELLED | OUTPATIENT
Start: 2022-10-08

## 2022-10-07 RX ORDER — SODIUM CHLORIDE 9 MG/ML
INJECTION, SOLUTION INTRAVENOUS CONTINUOUS
Status: CANCELLED | OUTPATIENT
Start: 2022-10-08

## 2022-10-07 RX ORDER — SODIUM CHLORIDE 9 MG/ML
5-250 INJECTION, SOLUTION INTRAVENOUS PRN
Status: CANCELLED | OUTPATIENT
Start: 2022-10-08 | End: 2022-10-09

## 2022-10-07 RX ORDER — SODIUM CHLORIDE 0.9 % (FLUSH) 0.9 %
5-40 SYRINGE (ML) INJECTION PRN
Status: CANCELLED | OUTPATIENT
Start: 2022-10-08

## 2022-10-07 RX ORDER — SODIUM CHLORIDE 9 MG/ML
5-250 INJECTION, SOLUTION INTRAVENOUS PRN
Status: DISCONTINUED | OUTPATIENT
Start: 2022-10-07 | End: 2022-10-08 | Stop reason: HOSPADM

## 2022-10-07 RX ORDER — DIPHENHYDRAMINE HYDROCHLORIDE 50 MG/ML
50 INJECTION INTRAMUSCULAR; INTRAVENOUS
Status: CANCELLED | OUTPATIENT
Start: 2022-10-08

## 2022-10-07 RX ADMIN — ERTAPENEM SODIUM 1000 MG: 1 INJECTION, POWDER, LYOPHILIZED, FOR SOLUTION INTRAMUSCULAR; INTRAVENOUS at 15:54

## 2022-10-07 RX ADMIN — SODIUM CHLORIDE, PRESERVATIVE FREE 10 ML: 5 INJECTION INTRAVENOUS at 16:27

## 2022-10-08 ENCOUNTER — HOSPITAL ENCOUNTER (OUTPATIENT)
Dept: INFUSION THERAPY | Age: 57
Discharge: HOME | End: 2022-10-08
Payer: COMMERCIAL

## 2022-10-08 VITALS
DIASTOLIC BLOOD PRESSURE: 77 MMHG | SYSTOLIC BLOOD PRESSURE: 148 MMHG | TEMPERATURE: 98 F | OXYGEN SATURATION: 96 % | RESPIRATION RATE: 16 BRPM

## 2022-10-08 DIAGNOSIS — B96.89 URINARY TRACT INFECTION DUE TO ESBL KLEBSIELLA: Primary | ICD-10-CM

## 2022-10-08 DIAGNOSIS — N39.0 URINARY TRACT INFECTION DUE TO ESBL KLEBSIELLA: Primary | ICD-10-CM

## 2022-10-08 PROCEDURE — 96365 THER/PROPH/DIAG IV INF INIT: CPT

## 2022-10-08 PROCEDURE — 2580000003 HC RX 258: Performed by: NURSE PRACTITIONER

## 2022-10-08 PROCEDURE — 6360000002 HC RX W HCPCS: Performed by: NURSE PRACTITIONER

## 2022-10-08 PROCEDURE — 96368 THER/DIAG CONCURRENT INF: CPT

## 2022-10-08 RX ORDER — DIPHENHYDRAMINE HYDROCHLORIDE 50 MG/ML
50 INJECTION INTRAMUSCULAR; INTRAVENOUS
OUTPATIENT
Start: 2022-10-09

## 2022-10-08 RX ORDER — EPINEPHRINE 1 MG/ML
0.3 INJECTION, SOLUTION, CONCENTRATE INTRAVENOUS PRN
OUTPATIENT
Start: 2022-10-09

## 2022-10-08 RX ORDER — ACETAMINOPHEN 325 MG/1
650 TABLET ORAL
OUTPATIENT
Start: 2022-10-09

## 2022-10-08 RX ORDER — ONDANSETRON 2 MG/ML
8 INJECTION INTRAMUSCULAR; INTRAVENOUS
OUTPATIENT
Start: 2022-10-09

## 2022-10-08 RX ORDER — SODIUM CHLORIDE 9 MG/ML
INJECTION, SOLUTION INTRAVENOUS CONTINUOUS
OUTPATIENT
Start: 2022-10-09

## 2022-10-08 RX ORDER — ALBUTEROL SULFATE 90 UG/1
4 AEROSOL, METERED RESPIRATORY (INHALATION) PRN
OUTPATIENT
Start: 2022-10-09

## 2022-10-08 RX ORDER — SODIUM CHLORIDE 9 MG/ML
5-250 INJECTION, SOLUTION INTRAVENOUS PRN
OUTPATIENT
Start: 2022-10-09

## 2022-10-08 RX ORDER — SODIUM CHLORIDE 0.9 % (FLUSH) 0.9 %
5-40 SYRINGE (ML) INJECTION PRN
OUTPATIENT
Start: 2022-10-09

## 2022-10-08 RX ADMIN — ERTAPENEM SODIUM 1000 MG: 1 INJECTION, POWDER, LYOPHILIZED, FOR SOLUTION INTRAMUSCULAR; INTRAVENOUS at 19:08

## 2022-10-09 ENCOUNTER — HOSPITAL ENCOUNTER (OUTPATIENT)
Dept: INFUSION THERAPY | Age: 57
Discharge: HOME | End: 2022-10-09
Payer: COMMERCIAL

## 2022-10-09 VITALS — TEMPERATURE: 98.1 F | RESPIRATION RATE: 18 BRPM | SYSTOLIC BLOOD PRESSURE: 131 MMHG | DIASTOLIC BLOOD PRESSURE: 79 MMHG

## 2022-10-09 DIAGNOSIS — B96.89 URINARY TRACT INFECTION DUE TO ESBL KLEBSIELLA: Primary | ICD-10-CM

## 2022-10-09 DIAGNOSIS — N39.0 URINARY TRACT INFECTION DUE TO ESBL KLEBSIELLA: Primary | ICD-10-CM

## 2022-10-09 PROCEDURE — 2580000003 HC RX 258: Performed by: NURSE PRACTITIONER

## 2022-10-09 PROCEDURE — 96365 THER/PROPH/DIAG IV INF INIT: CPT

## 2022-10-09 PROCEDURE — 6360000002 HC RX W HCPCS: Performed by: NURSE PRACTITIONER

## 2022-10-09 RX ORDER — SODIUM CHLORIDE 9 MG/ML
INJECTION, SOLUTION INTRAVENOUS CONTINUOUS
OUTPATIENT
Start: 2022-10-09

## 2022-10-09 RX ORDER — ACETAMINOPHEN 325 MG/1
650 TABLET ORAL
OUTPATIENT
Start: 2022-10-09

## 2022-10-09 RX ORDER — DIPHENHYDRAMINE HYDROCHLORIDE 50 MG/ML
50 INJECTION INTRAMUSCULAR; INTRAVENOUS
OUTPATIENT
Start: 2022-10-09

## 2022-10-09 RX ORDER — EPINEPHRINE 1 MG/ML
0.3 INJECTION, SOLUTION, CONCENTRATE INTRAVENOUS PRN
OUTPATIENT
Start: 2022-10-09

## 2022-10-09 RX ORDER — ALBUTEROL SULFATE 90 UG/1
4 AEROSOL, METERED RESPIRATORY (INHALATION) PRN
OUTPATIENT
Start: 2022-10-09

## 2022-10-09 RX ORDER — ONDANSETRON 2 MG/ML
8 INJECTION INTRAMUSCULAR; INTRAVENOUS
OUTPATIENT
Start: 2022-10-09

## 2022-10-09 RX ORDER — SODIUM CHLORIDE 0.9 % (FLUSH) 0.9 %
5-40 SYRINGE (ML) INJECTION PRN
OUTPATIENT
Start: 2022-10-09

## 2022-10-09 RX ORDER — SODIUM CHLORIDE 9 MG/ML
5-250 INJECTION, SOLUTION INTRAVENOUS PRN
OUTPATIENT
Start: 2022-10-09

## 2022-10-09 RX ADMIN — ERTAPENEM SODIUM 1000 MG: 1 INJECTION, POWDER, LYOPHILIZED, FOR SOLUTION INTRAMUSCULAR; INTRAVENOUS at 16:13

## 2022-10-10 DIAGNOSIS — E78.5 HYPERLIPIDEMIA, UNSPECIFIED HYPERLIPIDEMIA TYPE: ICD-10-CM

## 2022-10-10 RX ORDER — FLUTICASONE PROPIONATE 50 MCG
SPRAY, SUSPENSION (ML) NASAL
Qty: 1 EACH | Refills: 2 | Status: SHIPPED | OUTPATIENT
Start: 2022-10-10

## 2022-10-10 RX ORDER — ROSUVASTATIN CALCIUM 5 MG/1
TABLET, COATED ORAL
Qty: 90 TABLET | Refills: 1 | Status: SHIPPED | OUTPATIENT
Start: 2022-10-10 | End: 2022-10-31

## 2022-10-11 ENCOUNTER — HOSPITAL ENCOUNTER (OUTPATIENT)
Dept: CT IMAGING | Age: 57
Discharge: HOME | End: 2022-10-13
Payer: COMMERCIAL

## 2022-10-11 DIAGNOSIS — Z16.12 URINARY TRACT INFECTION DUE TO EXTENDED-SPECTRUM BETA LACTAMASE (ESBL) PRODUCING ESCHERICHIA COLI: ICD-10-CM

## 2022-10-11 DIAGNOSIS — B96.29 URINARY TRACT INFECTION DUE TO EXTENDED-SPECTRUM BETA LACTAMASE (ESBL) PRODUCING ESCHERICHIA COLI: ICD-10-CM

## 2022-10-11 DIAGNOSIS — N39.0 FREQUENT UTI: ICD-10-CM

## 2022-10-11 DIAGNOSIS — N39.0 URINARY TRACT INFECTION DUE TO EXTENDED-SPECTRUM BETA LACTAMASE (ESBL) PRODUCING ESCHERICHIA COLI: ICD-10-CM

## 2022-10-11 PROCEDURE — 74176 CT ABD & PELVIS W/O CONTRAST: CPT

## 2022-10-17 ENCOUNTER — OFFICE VISIT (OUTPATIENT)
Dept: UROLOGY | Age: 57
End: 2022-10-17
Payer: COMMERCIAL

## 2022-10-17 ENCOUNTER — HOSPITAL ENCOUNTER (OUTPATIENT)
Age: 57
Setting detail: SPECIMEN
Discharge: HOME | End: 2022-10-17
Payer: COMMERCIAL

## 2022-10-17 VITALS
BODY MASS INDEX: 45.2 KG/M2 | TEMPERATURE: 97.5 F | DIASTOLIC BLOOD PRESSURE: 87 MMHG | SYSTOLIC BLOOD PRESSURE: 147 MMHG | HEART RATE: 87 BPM | WEIGHT: 293 LBS

## 2022-10-17 DIAGNOSIS — Z16.12 URINARY TRACT INFECTION DUE TO EXTENDED-SPECTRUM BETA LACTAMASE (ESBL) PRODUCING ESCHERICHIA COLI: ICD-10-CM

## 2022-10-17 DIAGNOSIS — R33.9 INCOMPLETE BLADDER EMPTYING: ICD-10-CM

## 2022-10-17 DIAGNOSIS — N39.0 FREQUENT UTI: Primary | ICD-10-CM

## 2022-10-17 DIAGNOSIS — N39.0 URINARY TRACT INFECTION DUE TO EXTENDED-SPECTRUM BETA LACTAMASE (ESBL) PRODUCING ESCHERICHIA COLI: ICD-10-CM

## 2022-10-17 DIAGNOSIS — B96.29 URINARY TRACT INFECTION DUE TO EXTENDED-SPECTRUM BETA LACTAMASE (ESBL) PRODUCING ESCHERICHIA COLI: ICD-10-CM

## 2022-10-17 DIAGNOSIS — N39.0 FREQUENT UTI: ICD-10-CM

## 2022-10-17 LAB
BACTERIA: ABNORMAL
BILIRUBIN URINE: NEGATIVE
COLOR: YELLOW
EPITHELIAL CELLS UA: ABNORMAL /HPF (ref 0–25)
GLUCOSE URINE: NEGATIVE
KETONES, URINE: NEGATIVE
LEUKOCYTE ESTERASE, URINE: ABNORMAL
NITRITE, URINE: NEGATIVE
PH UA: 6 (ref 5–9)
PROTEIN UA: ABNORMAL
RBC UA: ABNORMAL /HPF (ref 0–2)
SPECIFIC GRAVITY UA: 1.01 (ref 1.01–1.02)
TURBIDITY: ABNORMAL
URINE HGB: ABNORMAL
UROBILINOGEN, URINE: NORMAL
WBC UA: ABNORMAL /HPF (ref 0–5)

## 2022-10-17 PROCEDURE — 99214 OFFICE O/P EST MOD 30 MIN: CPT | Performed by: UROLOGY

## 2022-10-17 PROCEDURE — 81001 URINALYSIS AUTO W/SCOPE: CPT

## 2022-10-17 PROCEDURE — 87086 URINE CULTURE/COLONY COUNT: CPT

## 2022-10-17 PROCEDURE — 87077 CULTURE AEROBIC IDENTIFY: CPT

## 2022-10-17 PROCEDURE — 87186 SC STD MICRODIL/AGAR DIL: CPT

## 2022-10-17 PROCEDURE — 51798 US URINE CAPACITY MEASURE: CPT | Performed by: UROLOGY

## 2022-10-17 RX ORDER — CEFDINIR 300 MG/1
300 CAPSULE ORAL 2 TIMES DAILY
Qty: 14 CAPSULE | Refills: 0 | Status: SHIPPED | OUTPATIENT
Start: 2022-10-17 | End: 2022-10-24

## 2022-10-17 ASSESSMENT — ENCOUNTER SYMPTOMS
CONSTIPATION: 0
APNEA: 0
ABDOMINAL PAIN: 0
VOMITING: 0
BACK PAIN: 0
COLOR CHANGE: 0
COUGH: 0
SHORTNESS OF BREATH: 0
NAUSEA: 0
EYE REDNESS: 0
WHEEZING: 0

## 2022-10-17 NOTE — PROGRESS NOTES
HPI:        Patient is a 61 y.o. female in no acute distress. She is alert and oriented to person, place, and time. History  2010 Hysterectomy and oophorectomy      Previous patient of Dr. Randy Ortiz for frequent UTI. On daily antibiotic     4/2015 referral for frequent UTI  Vaginal estrogen     LOST TO FOLLOW UP     7/2018 Referral from the ER for frequent UTI. She does complain of dysuria, frequency and abdominal pain when she develops a UTI. 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 There is a questionable ectopic ureterocele. The radiologist does feel that this could represent a large nabothian cyst or cystic/necrotic cervical malignancy. Patient did have a total abdominal hysterectomy in 2010 due to endometriosis with Dr. Dennis Marinelli. It is unclear if this is a radical hysterectomy with complete removal of the cervix. She denies constipation. 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 Cystoscopy showed fullness on the left side of the floor of the bladder,  no distinct cystocele seen. 12/2018 last seen     LOST TO FOLLOW UP     11/2021 - recurrent urinary tract infection returned to reestablish care     12/2021 - cranberry and vaginal estrogen started       Currently  Patient is here today for 4-week review. Patient did have a recent CT scan. Patient did have an infection at the last visit. We did have to treat this with IV antibiotics. Patient CT scan was independently reviewed today. 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 Patient does have a ureterocele.   This has been seen on several CT scans before. Patient has having flank pain. She reports cloudy urine. Her symptoms started last night. Patient does continue to have left flank pain. She was recently treated with IV antibiotics. Patient does state her pain began last night. It is moderate today. Past Medical History:   Diagnosis Date    Bell's palsy 2013    Carpal tunnel syndrome     Chicken pox     Hydronephrosis 2009    left     Hyperlipidemia 2013    Pneumonia     Renal atrophy     UTI (urinary tract infection)      Past Surgical History:   Procedure Laterality Date     SECTION      COLONOSCOPY N/A 2021    COLONOSCOPY performed by Lyubov Hernández MD at [facility]      HYSTERECTOMY, TOTAL ABDOMINAL (CERVIX REMOVED)      ROTATOR CUFF REPAIR Left 2017     Outpatient Encounter Medications as of 10/17/2022   Medication Sig Dispense Refill    rosuvastatin (CRESTOR) 5 MG tablet TAKE 1 TABLET BY MOUTH EVERY DAY 90 tablet 1    fluticasone (FLONASE) 50 MCG/ACT nasal spray USE 2 SPRAYS IN EACH NOSTRIL EVERYDAY 1 each 2    spironolactone (ALDACTONE) 25 MG tablet TAKE 1 TABLET BY MOUTH EVERY DAY 90 tablet 3    estradiol (ESTRACE VAGINAL) 0.1 MG/GM vaginal cream Place a pea-sized amount vaginally daily x 2 weeks, then 3 times per week thereafter. Use finger to apply, wash hands after application. DO NOT use plastic applicator. Call office if you stop this medication. 42.5 g 3    dilTIAZem (CARDIZEM CD) 120 MG extended release capsule Take 1 capsule by mouth in the morning. 30 capsule 11    CRANBERRY CONCENTRATE PO Take by mouth       No facility-administered encounter medications on file as of 10/17/2022.       Current Outpatient Medications on File Prior to Visit   Medication Sig Dispense Refill    rosuvastatin (CRESTOR) 5 MG tablet TAKE 1 TABLET BY MOUTH EVERY DAY 90 tablet 1    fluticasone (FLONASE) 50 MCG/ACT nasal spray USE 2 SPRAYS IN EACH NOSTRIL EVERYDAY 1 each 2    spironolactone (ALDACTONE) 25 MG tablet TAKE 1 TABLET BY MOUTH EVERY DAY 90 tablet 3    estradiol (ESTRACE VAGINAL) 0.1 MG/GM vaginal cream Place a pea-sized amount vaginally daily x 2 weeks, then 3 times per week thereafter. Use finger to apply, wash hands after application. DO NOT use plastic applicator. Call office if you stop this medication. 42.5 g 3    dilTIAZem (CARDIZEM CD) 120 MG extended release capsule Take 1 capsule by mouth in the morning. 30 capsule 11    CRANBERRY CONCENTRATE PO Take by mouth       No current facility-administered medications on file prior to visit. Sulfa antibiotics  Family History   Problem Relation Age of Onset    Cancer Mother         breast    Heart Disease Mother     Stroke Mother     Cancer Father      Social History     Tobacco Use   Smoking Status Never   Smokeless Tobacco Never       Social History     Substance and Sexual Activity   Alcohol Use No    Alcohol/week: 0.0 standard drinks       Review of Systems   Constitutional:  Negative for appetite change, chills and fever. Eyes:  Negative for redness and visual disturbance. Respiratory:  Negative for apnea, cough, shortness of breath and wheezing. Cardiovascular:  Negative for chest pain and leg swelling. Gastrointestinal:  Negative for abdominal pain, constipation, nausea and vomiting. Genitourinary:  Negative for difficulty urinating, dyspareunia, dysuria, enuresis, flank pain, frequency, hematuria, pelvic pain, urgency, vaginal bleeding and vaginal discharge. Musculoskeletal:  Negative for back pain, joint swelling and myalgias. Skin:  Negative for color change, rash and wound. Neurological:  Negative for dizziness, tremors and numbness. Hematological:  Negative for adenopathy. Does not bruise/bleed easily. Psychiatric/Behavioral:  Negative for sleep disturbance. There were no vitals taken for this visit. PHYSICAL EXAM:  Constitutional: Patient resting comfortably, in no acute distress.    Neuro: Alert and oriented to person place and time. Cranial nerves grossly intact. Psych: Mood and affect normal.  Skin: Warm, dry  HEENT: normocephalic, atraumatic  Lymphatics: No palpable lymphadenopathy  Lungs: Respiratory effort normal, unlabored  Cardiovascular:  Normal peripheral pulses  Abdomen: Soft, non-tender, non-distended with no organomegaly or palpable masses. : No CVA tenderness. Bladder non-tender and not distended. Pelvic: Deferred    Lab Results   Component Value Date    BUN 17 09/26/2022     Lab Results   Component Value Date    CREATININE 0.77 09/26/2022       ASSESSMENT:  This is a 61 y.o. female with the following diagnoses:   Diagnosis Orders   1. Frequent UTI  DC MEASUREMENT,POST-VOID RESIDUAL VOLUME BY US,NON-IMAGING    Urinalysis with Microscopic    Culture, Urine    cefdinir (OMNICEF) 300 MG capsule      2. Urinary tract infection due to extended-spectrum beta lactamase (ESBL) producing Escherichia coli  DC MEASUREMENT,POST-VOID RESIDUAL VOLUME BY US,NON-IMAGING    Urinalysis with Microscopic    Culture, Urine    cefdinir (OMNICEF) 300 MG capsule      3. Incomplete bladder emptying  DC MEASUREMENT,POST-VOID RESIDUAL VOLUME BY US,NON-IMAGING    Urinalysis with Microscopic    Culture, Urine    cefdinir (OMNICEF) 300 MG capsule             PLAN:  We will plan for cystoscopy left-sided ureteroscopy and stent placement. Patient does do better at this point time we will need to plan for either left partial nephrectomy to remove the upper pole moiety. We may be able to unroofed the cystocele in the bladder as well. We will figure this out after the neck surgery.

## 2022-10-18 LAB
CULTURE: ABNORMAL
SPECIMEN DESCRIPTION: ABNORMAL

## 2022-10-19 ENCOUNTER — TELEPHONE (OUTPATIENT)
Dept: UROLOGY | Age: 57
End: 2022-10-19

## 2022-10-19 RX ORDER — GRANULES FOR ORAL 3 G/1
3 POWDER ORAL
Qty: 3 EACH | Refills: 0 | Status: SHIPPED | OUTPATIENT
Start: 2022-10-19

## 2022-10-19 NOTE — TELEPHONE ENCOUNTER
UACS is positive for infection. I sent in culture specific fosfomycin. Please let her know that this is her only oral antibiotic choice as she does have allergy to sulfa medications. The other choices for antibiotics are intravenous. Take this antibiotic until gone. Take this with food and eat yogurt once per day to prevent GI upset. If you develop nausea, vomiting, or fevers call the office or go to the ER. If your urinary symptoms do not improve once completing the antibiotics call our office.

## 2022-10-27 ENCOUNTER — TELEPHONE (OUTPATIENT)
Dept: UROLOGY | Age: 57
End: 2022-10-27

## 2022-10-27 NOTE — TELEPHONE ENCOUNTER
Cloudy urine is NOT a sign of infection. Cloudy urine can be from medications, the food we eat, not drinking enough water. Achy legs can be from not drinking enough water as well.  If anything become worse she will need evaluated

## 2022-10-27 NOTE — TELEPHONE ENCOUNTER
Patient said she finished her antibiotic and she already has cloudy urine. She said her legs are achy,  but she is not sure if it is from that or not.

## 2022-10-31 ENCOUNTER — OFFICE VISIT (OUTPATIENT)
Dept: FAMILY MEDICINE CLINIC | Age: 57
End: 2022-10-31
Payer: COMMERCIAL

## 2022-10-31 ENCOUNTER — HOSPITAL ENCOUNTER (OUTPATIENT)
Age: 57
Discharge: HOME | End: 2022-10-31
Payer: COMMERCIAL

## 2022-10-31 VITALS
OXYGEN SATURATION: 97 % | SYSTOLIC BLOOD PRESSURE: 136 MMHG | WEIGHT: 293 LBS | BODY MASS INDEX: 44.48 KG/M2 | DIASTOLIC BLOOD PRESSURE: 82 MMHG | HEART RATE: 82 BPM

## 2022-10-31 DIAGNOSIS — M79.10 MYALGIA: Primary | ICD-10-CM

## 2022-10-31 DIAGNOSIS — M79.10 MYALGIA: ICD-10-CM

## 2022-10-31 LAB
ABSOLUTE EOS #: 0.2 K/UL (ref 0–0.4)
ABSOLUTE LYMPH #: 1.7 K/UL (ref 1–4.8)
ABSOLUTE MONO #: 0.5 K/UL (ref 0–1)
ANION GAP SERPL CALCULATED.3IONS-SCNC: 11 MMOL/L (ref 9–17)
BASOPHILS # BLD: 1 % (ref 0–2)
BASOPHILS ABSOLUTE: 0 K/UL (ref 0–0.2)
BUN BLDV-MCNC: 12 MG/DL (ref 6–20)
BUN/CREAT BLD: 21 (ref 9–20)
CALCIUM SERPL-MCNC: 9.5 MG/DL (ref 8.6–10.4)
CHLORIDE BLD-SCNC: 105 MMOL/L (ref 98–107)
CO2: 25 MMOL/L (ref 20–31)
CREAT SERPL-MCNC: 0.57 MG/DL (ref 0.5–0.9)
DIFFERENTIAL TYPE: YES
EOSINOPHILS RELATIVE PERCENT: 3 % (ref 0–5)
GFR SERPL CREATININE-BSD FRML MDRD: >60 ML/MIN/1.73M2
GLUCOSE BLD-MCNC: 108 MG/DL (ref 70–99)
HCT VFR BLD CALC: 41.9 % (ref 36–46)
HEMOGLOBIN: 14 G/DL (ref 12–16)
LYMPHOCYTES # BLD: 23 % (ref 15–40)
MCH RBC QN AUTO: 29.2 PG (ref 26–34)
MCHC RBC AUTO-ENTMCNC: 33.5 G/DL (ref 31–37)
MCV RBC AUTO: 87.1 FL (ref 80–100)
MONOCYTES # BLD: 8 % (ref 4–8)
PDW BLD-RTO: 13.7 % (ref 12.1–15.2)
PLATELET # BLD: 372 K/UL (ref 140–450)
POTASSIUM SERPL-SCNC: 4.2 MMOL/L (ref 3.7–5.3)
RBC # BLD: 4.81 M/UL (ref 4–5.2)
SEDIMENTATION RATE, ERYTHROCYTE: 8 MM/HR (ref 0–30)
SEG NEUTROPHILS: 65 % (ref 47–75)
SEGMENTED NEUTROPHILS ABSOLUTE COUNT: 4.8 K/UL (ref 2.5–7)
SODIUM BLD-SCNC: 141 MMOL/L (ref 135–144)
TOTAL CK: 59 U/L (ref 26–192)
TSH SERPL DL<=0.05 MIU/L-ACNC: 1.52 UIU/ML (ref 0.3–5)
VITAMIN B-12: 591 PG/ML (ref 232–1245)
WBC # BLD: 7.2 K/UL (ref 3.5–11)

## 2022-10-31 PROCEDURE — 82550 ASSAY OF CK (CPK): CPT

## 2022-10-31 PROCEDURE — 84443 ASSAY THYROID STIM HORMONE: CPT

## 2022-10-31 PROCEDURE — 85025 COMPLETE CBC W/AUTO DIFF WBC: CPT

## 2022-10-31 PROCEDURE — 86225 DNA ANTIBODY NATIVE: CPT

## 2022-10-31 PROCEDURE — 80048 BASIC METABOLIC PNL TOTAL CA: CPT

## 2022-10-31 PROCEDURE — 82607 VITAMIN B-12: CPT

## 2022-10-31 PROCEDURE — 99213 OFFICE O/P EST LOW 20 MIN: CPT | Performed by: FAMILY MEDICINE

## 2022-10-31 PROCEDURE — 85652 RBC SED RATE AUTOMATED: CPT

## 2022-10-31 PROCEDURE — 86038 ANTINUCLEAR ANTIBODIES: CPT

## 2022-10-31 PROCEDURE — 36415 COLL VENOUS BLD VENIPUNCTURE: CPT

## 2022-10-31 ASSESSMENT — ENCOUNTER SYMPTOMS
COUGH: 0
DIARRHEA: 0
VOMITING: 0
EYE REDNESS: 0
EYE DISCHARGE: 0
SHORTNESS OF BREATH: 0

## 2022-10-31 NOTE — PROGRESS NOTES
HPI Notes    Name: Coy Moran  : 1965        Chief Complaint:     Chief Complaint   Patient presents with    Leg Pain     Bilateral leg pain with some swelling. Symptoms started after patient started taking Diltiazem that was prescribed by her Cardiologist       History of Present Illness:     Coy Moran is a 61 y.o.  female who presents with Leg Pain (Bilateral leg pain with some swelling. Symptoms started after patient started taking Diltiazem that was prescribed by her Cardiologist)      Leg Pain   There was no injury mechanism (back in August pt was placed on the diltiazem and leg pain started after that. pt stopped taking diltiazem just 3d but still leg pain. Pt has also stopped the crestor months ago as pharmacy thought that was related to the leg pain. ). Pain location: bilatarel leg pain and can't even walk at times. No redness or swelling. The quality of the pain is described as aching (her legs feel like Anca Roger has the flu all the time\" some ankle swelling). The pain is moderate. The pain has been Constant since onset. Pertinent negatives include no inability to bear weight, loss of motion, loss of sensation, muscle weakness, numbness or tingling. Exacerbated by: pt states legs always hurts. She has tried nothing for the symptoms.  -- had negative doppler so no blood clot. Recurrent UTI-  in meantime, in Sept and OCt pt has been treating for recurrent UTIs per her urology and on antibiotics now. No fever or chills.      Past Medical History:     Past Medical History:   Diagnosis Date    Bell's palsy 2013    Carpal tunnel syndrome     Chicken pox     Hydronephrosis     left     Hyperlipidemia 2013    Pneumonia     Renal atrophy     UTI (urinary tract infection)       Reviewed all health maintenance requirements and ordered appropriate tests  Health Maintenance Due   Topic Date Due    HIV screen  Never done    DTaP/Tdap/Td vaccine (1 - Tdap) Never done Shingles vaccine (1 of 2) Never done    COVID-19 Vaccine (3 - Booster for Moderna series) 2021    Flu vaccine (1) 2022       Past Surgical History:     Past Surgical History:   Procedure Laterality Date     SECTION      COLONOSCOPY N/A 2021    COLONOSCOPY performed by Aria Choi MD at [facility] [place]      HYSTERECTOMY, TOTAL ABDOMINAL (CERVIX REMOVED)      ROTATOR CUFF REPAIR Left 2017        Medications:       Prior to Admission medications    Medication Sig Start Date End Date Taking? Authorizing Provider   fosfomycin tromethamine (MONUROL) 3 g PACK Take 1 packet by mouth every 3 days 10/19/22  Yes Robbin Chavez PA-C   fluticasone [facility]) 50 MCG/ACT nasal spray USE 2 SPRAYS IN EACH NOSTRIL EVERYDAY 10/10/22  Yes Giovanny Boland MD   estradiol (ESTRACE VAGINAL) 0.1 MG/GM vaginal cream Place a pea-sized amount vaginally daily x 2 weeks, then 3 times per week thereafter. Use finger to apply, wash hands after application. DO NOT use plastic applicator. Call office if you stop this medication. 22  Yes Pinky Place, APRN - CNP   CRANBERRY CONCENTRATE PO Take by mouth   Yes Historical Provider, MD   spironolactone (ALDACTONE) 25 MG tablet TAKE 1 TABLET BY MOUTH EVERY DAY  Patient not taking: Reported on 10/31/2022 10/6/22   Antonio Scruggs MD   dilTIAZem (CARDIZEM CD) 120 MG extended release capsule Take 1 capsule by mouth in the morning. Patient not taking: Reported on 10/31/2022 8/2/22   Antonio Scruggs MD        Allergies:       Sulfa antibiotics    Social History:     Tobacco:    reports that she has never smoked. She has never used smokeless tobacco.  Alcohol:      reports no history of alcohol use. Drug Use:  reports no history of drug use.     Family History:     Family History   Problem Relation Age of Onset    Cancer Mother         breast    Heart Disease Mother     Stroke Mother     Cancer Father        Review of Systems:       Review of Systems   Constitutional:  Negative for chills and fever. Eyes:  Negative for discharge and redness. Respiratory:  Negative for cough and shortness of breath. Gastrointestinal:  Negative for diarrhea and vomiting. Musculoskeletal:  Positive for myalgias. Myalgias   Skin:  Negative for pallor, rash and wound. Neurological:  Negative for dizziness, tingling, facial asymmetry and numbness. Physical Exam:     Physical Exam  Vitals reviewed. Constitutional:       General: She is not in acute distress. Appearance: Normal appearance. She is not ill-appearing. HENT:      Head: Normocephalic and atraumatic. Eyes:      General:         Right eye: No discharge. Left eye: No discharge. Cardiovascular:      Comments: No calf pain   Pulmonary:      Effort: Pulmonary effort is normal.   Musculoskeletal:         General: No swelling, deformity or signs of injury. Cervical back: Neck supple. Right lower leg: No edema. Left lower leg: No edema. Comments: +5/5 LE strength   Skin:     Findings: No erythema or rash. Neurological:      Mental Status: She is alert.        Vitals:  /82 (Site: Left Upper Arm)   Pulse 82   Wt (!) 310 lb (140.6 kg)   SpO2 97%   BMI 44.48 kg/m²       Data:     Lab Results   Component Value Date/Time     09/26/2022 03:53 PM    K 4.6 09/26/2022 03:53 PM     09/26/2022 03:53 PM    CO2 27 09/26/2022 03:53 PM    BUN 17 09/26/2022 03:53 PM    CREATININE 0.77 09/26/2022 03:53 PM    GLUCOSE 109 09/26/2022 03:53 PM    PROT 7.8 09/26/2022 03:53 PM    LABALBU 4.6 09/26/2022 03:53 PM    BILITOT 0.4 09/26/2022 03:53 PM    ALKPHOS 96 09/26/2022 03:53 PM    AST 20 09/26/2022 03:53 PM    ALT 32 09/26/2022 03:53 PM     Lab Results   Component Value Date/Time    WBC 9.8 09/26/2022 03:53 PM    RBC 4.79 09/26/2022 03:53 PM    HGB 14.7 09/26/2022 03:53 PM    HCT 44.0 09/26/2022 03:53 PM    MCV 91.9 09/26/2022 03:53 PM    MCH 30.7 09/26/2022 03:53 PM    MCHC 33.4 09/26/2022 03:53 PM    RDW 12.6 09/26/2022 03:53 PM     09/26/2022 03:53 PM    MPV 10.5 09/26/2022 03:53 PM     Lab Results   Component Value Date/Time    TSH 2.05 07/29/2022 01:42 PM     Lab Results   Component Value Date/Time    CHOL 240 07/27/2022 11:46 AM    CHOL 169 02/03/2014 12:00 AM    HDL 35 07/27/2022 11:46 AM    LABA1C 5.6 09/26/2022 03:53 PM          Assessment/Plan:        1. Myalgia  Pt will stay off diltiazem and get all labs today. - CBC with Auto Differential; Future  - Sedimentation rate, automated; Future  - Creatine Kinase; Future  - Vitamin B12; Future  - ASUNCION; Future  - TSH; Future  - Basic Metabolic Panel; Future        Return if symptoms worsen or fail to improve.       Electronically signed by Panchito Salazar MD on 10/31/2022 at 8:02 AM

## 2022-11-01 LAB
ANTI DNA DOUBLE STRANDED: 0.6 IU/ML
ANTI-NUCLEAR ANTIBODY (ANA): NEGATIVE
ENA ANTIBODIES SCREEN: 0.2 U/ML

## 2022-11-01 NOTE — PROGRESS NOTES
Patient instructed on the pre-operative, intra-operative, and post-operative process. Patient's surgical procedure and day of surgery confirmed. Patient instructed on NPO status. Medication instructions reviewed with patient, patient states she is not taking any meds at this time. Patient instructed to take shower prior to arrival to facility. Pre operative instruction sheet reviewed with patient per PAT phone interview. Patient voiced understanding and denies any questions at this time.

## 2022-11-07 ENCOUNTER — ANESTHESIA EVENT (OUTPATIENT)
Dept: OPERATING ROOM | Age: 57
DRG: 660 | End: 2022-11-07
Payer: COMMERCIAL

## 2022-11-07 NOTE — H&P
History and Physical    Patient:  Faith Medina  MRN: [id number]    CHIEF COMPLAINT:  left flank pain    HISTORY OF PRESENT ILLNESS:   The patient is a 61 y.o. female who presents with left flank pain. History   Hysterectomy and oophorectomy      Previous patient of Dr. Jim Michael for frequent UTI. On daily antibiotic     2015 referral for frequent UTI  Vaginal estrogen     LOST TO FOLLOW UP     2018 Referral from the ER for frequent UTI. She does complain of dysuria, frequency and abdominal pain when she develops a UTI. 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 There is a questionable ectopic ureterocele. The radiologist does feel that this could represent a large nabothian cyst or cystic/necrotic cervical malignancy. Patient did have a total abdominal hysterectomy in  due to endometriosis with Dr. Jose Pack. It is unclear if this is a radical hysterectomy with complete removal of the cervix. She denies constipation. 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 Cystoscopy showed fullness on the left side of the floor of the bladder,  no distinct cystocele seen.      2018 last seen     LOST TO FOLLOW UP     2021 - recurrent urinary tract infection returned to reestablish care     2021 - cranberry and vaginal estrogen started           Past Medical History:    Past Medical History:   Diagnosis Date    Bell's palsy 2013    Carpal tunnel syndrome     Chicken pox     Hydronephrosis 2009    left     Hyperlipidemia 2013    Pneumonia     Renal atrophy     UTI (urinary tract infection)        Past Surgical History:    Past Surgical History:   Procedure Laterality Date     SECTION      COLONOSCOPY N/A 9/13/2021    [facility]    HYSTERECTOMY, TOTAL ABDOMINAL (CERVIX REMOVED)  2010    ROTATOR CUFF REPAIR Left 11/2017       Medications Prior to Admission:    Prior to Admission medications    Medication Sig Start Date End Date Taking? Authorizing Provider   fosfomycin tromethamine (MONUROL) 3 g PACK Take 1 packet by mouth every 3 days  Patient not taking: Reported on 11/1/2022 10/19/22   Dany Chavez PA-C   fluticasone (FLONASE) 50 MCG/ACT nasal spray USE 2 SPRAYS IN EACH NOSTRIL EVERYDAY  Patient not taking: Reported on 11/1/2022 10/10/22   Quyen Duran MD   spironolactone (ALDACTONE) 25 MG tablet TAKE 1 TABLET BY MOUTH EVERY DAY  Patient not taking: Reported on 10/31/2022 10/6/22   Kirt Braun MD   estradiol (ESTRACE VAGINAL) 0.1 MG/GM vaginal cream Place a pea-sized amount vaginally daily x 2 weeks, then 3 times per week thereafter. Use finger to apply, wash hands after application. DO NOT use plastic applicator. Call office if you stop this medication. Patient not taking: Reported on 11/1/2022 9/22/22   ERNESTO Morley - CNP   dilTIAZem (CARDIZEM CD) 120 MG extended release capsule Take 1 capsule by mouth in the morning.   Patient not taking: Reported on 10/31/2022 8/2/22   Kirt Braun MD   CRANBERRY CONCENTRATE PO Take by mouth  Patient not taking: Reported on 11/1/2022    Historical Provider, MD       Allergies:  Sulfa antibiotics    Social History:    Social History     Socioeconomic History    Marital status:      Spouse name: Not on file    Number of children: Not on file    Years of education: Not on file    Highest education level: Not on file   Occupational History    Not on file   Tobacco Use    Smoking status: Never    Smokeless tobacco: Never   Vaping Use    Vaping Use: Never used   Substance and Sexual Activity    Alcohol use: No     Alcohol/week: 0.0 standard drinks Drug use: No    Sexual activity: Not on file   Other Topics Concern    Not on file   Social History Narrative    Not on file     Social Determinants of Health     Financial Resource Strain: Low Risk     Difficulty of Paying Living Expenses: Not hard at all   Food Insecurity: No Food Insecurity    Worried About Running Out of Food in the Last Year: Never true    Ran Out of Food in the Last Year: Never true   Transportation Needs: Not on file   Physical Activity: Not on file   Stress: Not on file   Social Connections: Not on file   Intimate Partner Violence: Not on file   Housing Stability: Not on file       Family History:    Family History   Problem Relation Age of Onset    Cancer Mother         breast    Heart Disease Mother     Stroke Mother     Cancer Father        REVIEW OF SYSTEMS:  All systems reviewed and negative except for that already noted in the HPI. Physical Exam:      This a 61 y.o. female   No data found. Constitutional: Patient in no acute distress. Neuro: Alert and oriented to person, place and time. Psych: mood and affect normal  HEENT negative  Lungs: Respiratory effort is normal  Cardiovascular: Normal peripheral pulses  Abdomen: Soft, non-tender, non-distended with no CVA, flank pain or hepatosplenomegaly. No hernias. Kidneys normal.  Lymphatics: No palpable lymphadenopathy. Bladder non-tender and not distended. Pelvic exam:  External genitalia normal  Urethral and urethral meatus normal  Vagina normal with no evidence of pelvic prolapse  Uterus normal  Adnexa normal  Anus and perineum normal  Rectal exam not indicated    LABS:   No results for input(s): WBC, HGB, HCT, MCV, PLT in the last 72 hours. No results for input(s): NA, K, CL, CO2, PHOS, BUN, CREATININE, CA in the last 72 hours.     Additional Lab/culture results:    Urinalysis: No results for input(s): COLORU, PHUR, LABCAST, WBCUA, RBCUA, MUCUS, TRICHOMONAS, YEAST, BACTERIA, CLARITYU, SPECGRAV, LEUKOCYTESUR, UROBILINOGEN, Saray Carballo in the last 72 hours.     Invalid input(s): NITRATE, GLUCOSEUKETONESUAMORPHOUS     -----------------------------------------------------------------  Imaging Results:      Assessment and Plan   Impression:    Patient Active Problem List   Diagnosis    Bell's palsy    Ureterocele    ITZ (obstructive sleep apnea)    Urinary tract infection due to ESBL Klebsiella       Plan: cysto left ureteroscopy/ left stent    Vj Zelaya MD  12:38 PM 11/7/2022

## 2022-11-08 ENCOUNTER — ANESTHESIA (OUTPATIENT)
Dept: OPERATING ROOM | Age: 57
DRG: 660 | End: 2022-11-08
Payer: COMMERCIAL

## 2022-11-08 ENCOUNTER — HOSPITAL ENCOUNTER (OUTPATIENT)
Age: 57
Setting detail: OUTPATIENT SURGERY
Discharge: HOME | DRG: 660 | End: 2022-11-08
Attending: UROLOGY | Admitting: UROLOGY
Payer: COMMERCIAL

## 2022-11-08 ENCOUNTER — APPOINTMENT (OUTPATIENT)
Dept: GENERAL RADIOLOGY | Age: 57
DRG: 660 | End: 2022-11-08
Attending: UROLOGY
Payer: COMMERCIAL

## 2022-11-08 VITALS
WEIGHT: 293 LBS | BODY MASS INDEX: 41.95 KG/M2 | SYSTOLIC BLOOD PRESSURE: 147 MMHG | RESPIRATION RATE: 15 BRPM | DIASTOLIC BLOOD PRESSURE: 71 MMHG | TEMPERATURE: 97.3 F | OXYGEN SATURATION: 98 % | HEART RATE: 68 BPM | HEIGHT: 70 IN

## 2022-11-08 PROCEDURE — 6370000000 HC RX 637 (ALT 250 FOR IP): Performed by: NURSE ANESTHETIST, CERTIFIED REGISTERED

## 2022-11-08 PROCEDURE — 7100000010 HC PHASE II RECOVERY - FIRST 15 MIN: Performed by: UROLOGY

## 2022-11-08 PROCEDURE — 0T778DZ DILATION OF LEFT URETER WITH INTRALUMINAL DEVICE, VIA NATURAL OR ARTIFICIAL OPENING ENDOSCOPIC: ICD-10-PCS | Performed by: UROLOGY

## 2022-11-08 PROCEDURE — 2709999900 HC NON-CHARGEABLE SUPPLY: Performed by: UROLOGY

## 2022-11-08 PROCEDURE — 7100000001 HC PACU RECOVERY - ADDTL 15 MIN: Performed by: UROLOGY

## 2022-11-08 PROCEDURE — 3600000013 HC SURGERY LEVEL 3 ADDTL 15MIN: Performed by: UROLOGY

## 2022-11-08 PROCEDURE — 3700000000 HC ANESTHESIA ATTENDED CARE: Performed by: UROLOGY

## 2022-11-08 PROCEDURE — 2500000003 HC RX 250 WO HCPCS

## 2022-11-08 PROCEDURE — 6360000002 HC RX W HCPCS: Performed by: UROLOGY

## 2022-11-08 PROCEDURE — 6360000002 HC RX W HCPCS

## 2022-11-08 PROCEDURE — 3700000001 HC ADD 15 MINUTES (ANESTHESIA): Performed by: UROLOGY

## 2022-11-08 PROCEDURE — C2617 STENT, NON-COR, TEM W/O DEL: HCPCS | Performed by: UROLOGY

## 2022-11-08 PROCEDURE — 6360000004 HC RX CONTRAST MEDICATION: Performed by: UROLOGY

## 2022-11-08 PROCEDURE — 7100000000 HC PACU RECOVERY - FIRST 15 MIN: Performed by: UROLOGY

## 2022-11-08 PROCEDURE — 2580000003 HC RX 258: Performed by: NURSE ANESTHETIST, CERTIFIED REGISTERED

## 2022-11-08 PROCEDURE — 7100000011 HC PHASE II RECOVERY - ADDTL 15 MIN: Performed by: UROLOGY

## 2022-11-08 PROCEDURE — 3209999900 FLUORO FOR SURGICAL PROCEDURES

## 2022-11-08 PROCEDURE — C1769 GUIDE WIRE: HCPCS | Performed by: UROLOGY

## 2022-11-08 PROCEDURE — 3600000003 HC SURGERY LEVEL 3 BASE: Performed by: UROLOGY

## 2022-11-08 PROCEDURE — 6370000000 HC RX 637 (ALT 250 FOR IP): Performed by: UROLOGY

## 2022-11-08 DEVICE — URETERAL STENT WITH SIDE HOLES 6FX26CM
Type: IMPLANTABLE DEVICE | Site: URETER | Status: FUNCTIONAL
Brand: TRIA™ FIRM

## 2022-11-08 RX ORDER — SODIUM CHLORIDE 9 MG/ML
INJECTION, SOLUTION INTRAVENOUS PRN
Status: DISCONTINUED | OUTPATIENT
Start: 2022-11-08 | End: 2022-11-08 | Stop reason: HOSPADM

## 2022-11-08 RX ORDER — DEXAMETHASONE SODIUM PHOSPHATE 4 MG/ML
INJECTION, SOLUTION INTRA-ARTICULAR; INTRALESIONAL; INTRAMUSCULAR; INTRAVENOUS; SOFT TISSUE PRN
Status: DISCONTINUED | OUTPATIENT
Start: 2022-11-08 | End: 2022-11-08 | Stop reason: SDUPTHER

## 2022-11-08 RX ORDER — LIDOCAINE HYDROCHLORIDE 20 MG/ML
INJECTION, SOLUTION EPIDURAL; INFILTRATION; INTRACAUDAL; PERINEURAL PRN
Status: DISCONTINUED | OUTPATIENT
Start: 2022-11-08 | End: 2022-11-08 | Stop reason: SDUPTHER

## 2022-11-08 RX ORDER — ACETAMINOPHEN 325 MG/1
650 TABLET ORAL ONCE
Status: COMPLETED | OUTPATIENT
Start: 2022-11-08 | End: 2022-11-08

## 2022-11-08 RX ORDER — KETOROLAC TROMETHAMINE 30 MG/ML
INJECTION, SOLUTION INTRAMUSCULAR; INTRAVENOUS PRN
Status: DISCONTINUED | OUTPATIENT
Start: 2022-11-08 | End: 2022-11-08 | Stop reason: SDUPTHER

## 2022-11-08 RX ORDER — SODIUM CHLORIDE 0.9 % (FLUSH) 0.9 %
5-40 SYRINGE (ML) INJECTION EVERY 12 HOURS SCHEDULED
Status: DISCONTINUED | OUTPATIENT
Start: 2022-11-08 | End: 2022-11-08 | Stop reason: HOSPADM

## 2022-11-08 RX ORDER — FENTANYL CITRATE 50 UG/ML
INJECTION, SOLUTION INTRAMUSCULAR; INTRAVENOUS PRN
Status: DISCONTINUED | OUTPATIENT
Start: 2022-11-08 | End: 2022-11-08 | Stop reason: SDUPTHER

## 2022-11-08 RX ORDER — DIMENHYDRINATE 50 MG/1
50 TABLET ORAL ONCE
Status: COMPLETED | OUTPATIENT
Start: 2022-11-08 | End: 2022-11-08

## 2022-11-08 RX ORDER — SODIUM CHLORIDE, SODIUM LACTATE, POTASSIUM CHLORIDE, CALCIUM CHLORIDE 600; 310; 30; 20 MG/100ML; MG/100ML; MG/100ML; MG/100ML
INJECTION, SOLUTION INTRAVENOUS CONTINUOUS
Status: DISCONTINUED | OUTPATIENT
Start: 2022-11-08 | End: 2022-11-08 | Stop reason: HOSPADM

## 2022-11-08 RX ORDER — HYDROCODONE BITARTRATE AND ACETAMINOPHEN 5; 325 MG/1; MG/1
1 TABLET ORAL EVERY 6 HOURS PRN
Status: DISCONTINUED | OUTPATIENT
Start: 2022-11-08 | End: 2022-11-08 | Stop reason: HOSPADM

## 2022-11-08 RX ORDER — SODIUM CHLORIDE 0.9 % (FLUSH) 0.9 %
5-40 SYRINGE (ML) INJECTION PRN
Status: DISCONTINUED | OUTPATIENT
Start: 2022-11-08 | End: 2022-11-08 | Stop reason: HOSPADM

## 2022-11-08 RX ORDER — FENTANYL CITRATE 50 UG/ML
50 INJECTION, SOLUTION INTRAMUSCULAR; INTRAVENOUS EVERY 5 MIN PRN
Status: DISCONTINUED | OUTPATIENT
Start: 2022-11-08 | End: 2022-11-08 | Stop reason: HOSPADM

## 2022-11-08 RX ORDER — ONDANSETRON 2 MG/ML
INJECTION INTRAMUSCULAR; INTRAVENOUS PRN
Status: DISCONTINUED | OUTPATIENT
Start: 2022-11-08 | End: 2022-11-08 | Stop reason: SDUPTHER

## 2022-11-08 RX ORDER — PROPOFOL 10 MG/ML
INJECTION, EMULSION INTRAVENOUS PRN
Status: DISCONTINUED | OUTPATIENT
Start: 2022-11-08 | End: 2022-11-08 | Stop reason: SDUPTHER

## 2022-11-08 RX ORDER — LIDOCAINE HYDROCHLORIDE 20 MG/ML
JELLY TOPICAL PRN
Status: DISCONTINUED | OUTPATIENT
Start: 2022-11-08 | End: 2022-11-08 | Stop reason: ALTCHOICE

## 2022-11-08 RX ADMIN — HYDROCODONE BITARTRATE AND ACETAMINOPHEN 1 TABLET: 5; 325 TABLET ORAL at 11:18

## 2022-11-08 RX ADMIN — ACETAMINOPHEN 650 MG: 325 TABLET ORAL at 07:40

## 2022-11-08 RX ADMIN — FENTANYL CITRATE 50 MCG: 50 INJECTION INTRAMUSCULAR; INTRAVENOUS at 09:27

## 2022-11-08 RX ADMIN — KETOROLAC TROMETHAMINE 15 MG: 30 INJECTION, SOLUTION INTRAMUSCULAR at 10:12

## 2022-11-08 RX ADMIN — LIDOCAINE HYDROCHLORIDE 100 MG: 20 INJECTION, SOLUTION EPIDURAL; INFILTRATION; INTRACAUDAL; PERINEURAL at 09:27

## 2022-11-08 RX ADMIN — PROPOFOL 200 MG: 10 INJECTION, EMULSION INTRAVENOUS at 09:27

## 2022-11-08 RX ADMIN — Medication 3000 MG: at 09:21

## 2022-11-08 RX ADMIN — SODIUM CHLORIDE, POTASSIUM CHLORIDE, SODIUM LACTATE AND CALCIUM CHLORIDE: 600; 310; 30; 20 INJECTION, SOLUTION INTRAVENOUS at 07:54

## 2022-11-08 RX ADMIN — ONDANSETRON 4 MG: 2 INJECTION INTRAMUSCULAR; INTRAVENOUS at 10:12

## 2022-11-08 RX ADMIN — FENTANYL CITRATE 50 MCG: 50 INJECTION INTRAMUSCULAR; INTRAVENOUS at 09:49

## 2022-11-08 RX ADMIN — DIMENHYDRINATE 50 MG: 50 TABLET ORAL at 07:40

## 2022-11-08 RX ADMIN — DEXAMETHASONE SODIUM PHOSPHATE 8 MG: 4 INJECTION, SOLUTION INTRAMUSCULAR; INTRAVENOUS at 09:31

## 2022-11-08 ASSESSMENT — PAIN SCALES - GENERAL
PAINLEVEL_OUTOF10: 4
PAINLEVEL_OUTOF10: 4
PAINLEVEL_OUTOF10: 0
PAINLEVEL_OUTOF10: 2

## 2022-11-08 ASSESSMENT — PAIN DESCRIPTION - DESCRIPTORS
DESCRIPTORS: ACHING

## 2022-11-08 ASSESSMENT — PAIN DESCRIPTION - ORIENTATION
ORIENTATION: LEFT

## 2022-11-08 ASSESSMENT — PAIN DESCRIPTION - LOCATION
LOCATION: FLANK

## 2022-11-08 ASSESSMENT — PAIN - FUNCTIONAL ASSESSMENT: PAIN_FUNCTIONAL_ASSESSMENT: NONE - DENIES PAIN

## 2022-11-08 ASSESSMENT — PAIN DESCRIPTION - PAIN TYPE
TYPE: SURGICAL PAIN

## 2022-11-08 NOTE — ANESTHESIA POSTPROCEDURE EVALUATION
Department of Anesthesiology  Postprocedure Note    Patient: Clemencia Fatima  MRN: [id number]  YOB: 1965  Date of evaluation: 11/8/2022      Procedure Summary     Date: 11/08/22 Room / Location: [facility]    Anesthesia Start: 8572 Anesthesia Stop: 0563    Procedures:       CYSTOSCOPY (Left)      URETEROSCOPY (Left)      CYSTOSCOPY URETERAL STENT INSERTION (Left) Diagnosis:       Urinary tract infection without hematuria, site unspecified      (N39.0, R33.9)    Surgeons: Drea Tran MD Responsible Provider: ERNESTO Lamas CRNA    Anesthesia Type: general ASA Status: 2          Anesthesia Type: No value filed.     Julian Phase I: Julian Score: 10    Julian Phase II: Julian Score: 10      Anesthesia Post Evaluation    Patient location during evaluation: bedside  Patient participation: complete - patient participated  Level of consciousness: awake and alert  Pain score: 2  Airway patency: patent  Nausea & Vomiting: no nausea and no vomiting  Complications: no  Cardiovascular status: hemodynamically stable  Respiratory status: acceptable  Hydration status: stable

## 2022-11-08 NOTE — DISCHARGE INSTRUCTIONS
SAME DAY SURGERY DISCHARGE INSTRUCTIONS    1. Do not drive or operate hazardous machinery for 24 hours. 2.  Do not make important personal or business decisions for 24 hours. 3.  Do not drink alcoholic beverages for 24 hours. 4.  Do not smoke tobacco products for 24 hours. 5.  Eat light foods (Jell-O, soups, etc....) and drink plenty of fluids (water, Sprite, etc...) up to 8 glasses per day, as you can tolerate. 6.  Limit your activities for 24 hours. Do not engage in heavy work until your surgeon gives you permission. 7.  Call your surgeon for any questions regarding your surgery. CYSTOSCOPY DISCHARGE INSTRUCTIONS    Possible burning during urination and/or blood tinged urine. Drink 6-8 glasses of water for the next day or so. (This helps to flush the urinary tract.)    Call Dr. Phoebe Leventhal ([phone]) if you develop:    Fever over 100 degrees  Prolonged soreness/pain  Unusual bleeding/bruising  Unable to urinate or if urine is bloody  You cannot pass urine 8 hours after the test.  You have pain in your belly or your back just below your rib cage. (This is called flank pain.)  You have frequent urge to urinate but can pass only small amounts of urine. Call Dr. Phoebe Leventhal office for follow-up appointment ([phone]).

## 2022-11-08 NOTE — PROGRESS NOTES
Patient verbalizes readiness for discharge. Discharge instructions given to patient and responsible adult, answered all questions, and verbalized understanding of discharge instructions. Discharge Criteria    Inpatients must meet Criteria 1 through 7. All other patients are either YES or N/A. If a NO is chosen then Anesthesia or Surgeon must be notified. 1.  Minimum 30 minutes after last dose of sedative medication, minimum 120 minutes after last dose of reversal agent. Yes      2. Systolic BP stable within 20 mmHg for 30 minutes & systolic BP between 90 & 038 or within 10 mmHg of baseline. Yes      3. Pulse between 60 and 100 or within 10 bpm of baseline. Yes      4. Spontaneous respiratory rate >/= 10 per minute. Yes      5. SaO2 >/= 95 or  >/= baseline. Yes      6. Able to cough and swallow or return to baseline function. Yes      7. Alert and oriented or return to baseline mental status. Yes      8. Demonstrates controlled, coordinated movements, ambulates with steady gait, or return to baseline activity function. Yes      9. Minimal or no pain or nausea, or at a level tolerable and acceptable to patient. Yes      10. Takes and retains oral fluids as allowed. Yes      11. Procedural / perioperative site stable. Minimal or no bleeding. Yes          12. If GI endoscopy procedure, minimal or no abdominal distention or passing flatus. N/A      13. Written discharge instructions and emergency telephone number provided. Yes      14. Accompanied by a responsible adult.     Yes

## 2022-11-08 NOTE — BRIEF OP NOTE
Brief Postoperative Note      Patient: Dina Leggett  YOB: 1965  MRN: [id number]    Date of Procedure: 11/8/2022    Pre-Op Diagnosis: N39.0, R33.9    Post-Op Diagnosis: Same       Procedure(s):  CYSTOSCOPY  URETEROSCOPY  CYSTOSCOPY URETERAL STENT INSERTION    Surgeon(s):  Parker Hammer MD    Assistant:  * No surgical staff found *    Anesthesia: General    Estimated Blood Loss (mL): Minimal    Complications: None    Specimens:   * No specimens in log *    Implants:  Implant Name Type Inv.  Item Serial No.  Lot No. LRB No. 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

## 2022-11-08 NOTE — ANESTHESIA PRE PROCEDURE
Department of Anesthesiology  Preprocedure Note       Name:  Ling Bonner   Age:  61 y.o.  :  1965                                          MRN:  [id number]         Date:  2022      Surgeon: Oma Martin):  Clemente Seth MD    Procedure: Procedure(s):  CYSTOSCOPY  URETEROSCOPY  CYSTOSCOPY URETERAL STENT INSERTION    Medications prior to admission:   Prior to Admission medications    Medication Sig Start Date End Date Taking? Authorizing Provider   fluticasone (FLONASE) 50 MCG/ACT nasal spray USE 2 SPRAYS IN EACH NOSTRIL EVERYDAY  Patient taking differently: daily as needed 10/10/22   Charlie Allen MD   spironolactone (ALDACTONE) 25 MG tablet TAKE 1 TABLET BY MOUTH EVERY DAY 10/6/22   Thierry Zafar MD   estradiol (ESTRACE VAGINAL) 0.1 MG/GM vaginal cream Place a pea-sized amount vaginally daily x 2 weeks, then 3 times per week thereafter. Use finger to apply, wash hands after application. DO NOT use plastic applicator. Call office if you stop this medication. 22   ERNESTO Elliott - CARLOS   dilTIAZem (CARDIZEM CD) 120 MG extended release capsule Take 1 capsule by mouth in the morning. 22   Thierry Zafar MD   CRANBERRY CONCENTRATE PO Take by mouth    Historical Provider, MD       Current medications:    Current Facility-Administered Medications   Medication Dose Route Frequency Provider Last Rate Last Admin    lactated ringers infusion   IntraVENous Continuous ERNESTO Estrada - CRNA 100 mL/hr at 22 0754 New Bag at 22 0754    ceFAZolin (ANCEF) 3000 mg in dextrose 5 % 100 mL IVPB  3,000 mg IntraVENous On Call to Jenny Lebron MD           Allergies:     Allergies   Allergen Reactions    Sulfa Antibiotics Other (See Comments)     Patient says she was told she had a reaction when she was a child but does not know what the reaction was       Problem List:    Patient Active Problem List   Diagnosis Code    Bell's palsy G51.0    Ureterocele N28.89    ITZ (obstructive sleep apnea) G47.33    Urinary tract infection due to ESBL Klebsiella N39.0, B96.89       Past Medical History:        Diagnosis Date    Bell's palsy 2013    Carpal tunnel syndrome     Chicken pox     Hydronephrosis 2009    left     Hyperlipidemia 2013    Pneumonia     Renal atrophy     UTI (urinary tract infection)        Past Surgical History:        Procedure Laterality Date     SECTION      COLONOSCOPY N/A 2021    [facility]      HYSTERECTOMY, TOTAL ABDOMINAL (CERVIX REMOVED)  2010    ROTATOR CUFF REPAIR Left 2017       Social History:    Social History     Tobacco Use    Smoking status: Never    Smokeless tobacco: Never   Substance Use Topics    Alcohol use: No     Alcohol/week: 0.0 standard drinks                                Counseling given: Not Answered      Vital Signs (Current):   Vitals:    22 1226 22 0734   BP:  (!) 149/88   Pulse:  68   Resp:  13   Temp:  36.4 °C (97.5 °F)   TempSrc:  Temporal   SpO2:  97%   Weight: (!) 310 lb (140.6 kg) (!) 311 lb 6.4 oz (141.3 kg)   Height: 5' 10\" (1.778 m) 5' 10\" (1.778 m)                                              BP Readings from Last 3 Encounters:   22 (!) 149/88   10/31/22 136/82   10/17/22 (!) 147/87       NPO Status: Time of last liquid consumption:                         Time of last solid consumption:                         Date of last liquid consumption: 22                        Date of last solid food consumption: 22    BMI:   Wt Readings from Last 3 Encounters:   22 (!) 311 lb 6.4 oz (141.3 kg)   10/31/22 (!) 310 lb (140.6 kg)   10/17/22 (!) 315 lb (142.9 kg)     Body mass index is 44.68 kg/m².     CBC:   Lab Results   Component Value Date/Time    WBC 7.2 10/31/2022 08:10 AM    RBC 4.81 10/31/2022 08:10 AM    HGB 14.0 10/31/2022 08:10 AM    HCT 41.9 10/31/2022 08:10 AM    MCV 87.1 10/31/2022 08:10 AM    RDW 13.7 10/31/2022 08:10 AM     10/31/2022 08:10 AM       CMP:   Lab Results   Component Value Date/Time     10/31/2022 08:10 AM    K 4.2 10/31/2022 08:10 AM     10/31/2022 08:10 AM    CO2 25 10/31/2022 08:10 AM    BUN 12 10/31/2022 08:10 AM    CREATININE 0.57 10/31/2022 08:10 AM    GFRAA >60 09/26/2022 03:53 PM    LABGLOM >60 10/31/2022 08:10 AM    GLUCOSE 108 10/31/2022 08:10 AM    PROT 7.8 09/26/2022 03:53 PM    CALCIUM 9.5 10/31/2022 08:10 AM    BILITOT 0.4 09/26/2022 03:53 PM    ALKPHOS 96 09/26/2022 03:53 PM    AST 20 09/26/2022 03:53 PM    ALT 32 09/26/2022 03:53 PM       POC Tests: No results for input(s): POCGLU, POCNA, POCK, POCCL, POCBUN, POCHEMO, POCHCT in the last 72 hours. Coags: No results found for: PROTIME, INR, APTT    HCG (If Applicable): No results found for: PREGTESTUR, PREGSERUM, HCG, HCGQUANT     ABGs: No results found for: PHART, PO2ART, VDK4GFP, DXQ5NIH, BEART, K6ZHIYEI     Type & Screen (If Applicable):  No results found for: LABABO, LABRH    Drug/Infectious Status (If Applicable):  Lab Results   Component Value Date/Time    HEPCAB NONREACTIVE 07/29/2021 08:37 AM       COVID-19 Screening (If Applicable):   Lab Results   Component Value Date/Time    COVID19 Not Detected 09/13/2021 08:12 AM    COVID19 Not Detected 12/11/2020 03:05 PM           Anesthesia Evaluation  Patient summary reviewed and Nursing notes reviewed no history of anesthetic complications:   Airway: Mallampati: II          Dental:    (+) bridge  Comment: Upper bridge.     Pulmonary: breath sounds clear to auscultation  (+) sleep apnea: on CPAP,      (-) pneumonia                           Cardiovascular:  Exercise tolerance: good (>4 METS),           Rhythm: regular  Rate: normal           Beta Blocker:  Not on Beta Blocker         Neuro/Psych:   (+) neuromuscular disease:,             GI/Hepatic/Renal:   (+) morbid obesity          Endo/Other: Negative Endo/Other ROS Abdominal:   (+) obese,           Vascular: negative vascular ROS. Other Findings:           Anesthesia Plan      general     ASA 2       Induction: intravenous. MIPS: Postoperative opioids intended and Prophylactic antiemetics administered. Anesthetic plan and risks discussed with patient. Plan discussed with CRNA.                     ERNESTO Nice - VALENCIA   11/8/2022

## 2022-11-09 NOTE — OP NOTE
[address], [place] [address]                                OPERATIVE REPORT    PATIENT NAME: Arturo Lopez                     :        1965  MED REC NO:   [id number]                              ROOM:  ACCOUNT NO:   [de-identified]                           ADMIT DATE: 2022  PROVIDER:     Jairo Sharma    DATE OF PROCEDURE:  2022    SURGEON:  Dr. Jairo Sharma. ASSISTANT:  None. PREOPERATIVE DIAGNOSES:  1. Frequent urinary tract infections. 2.  Left-sided hydronephrosis. POSTOPERATIVE DIAGNOSES:  1. Frequent urinary tract infections. 2.  Left-sided hydronephrosis. 3.  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 PROCEDURES PERFORMED:  1. Cystoscopy with left ureteroscopy. 2.  Cystoscopy with left ureteral stent placement. ANESTHESIA:  General.    COMPLICATIONS:  None. ESTIMATED BLOOD LOSS:  Minimal.    SPECIMENS:  None. PROSTHESIS:  None. DISPOSITION:  Stable. FINDINGS:  1. Capacious dilated left upper pole urinary and renal moiety. 2.  Left ureterocele form the upper pole. 3.  Ectopic ureteral orifice. INDICATIONS:  This patient is a 74-year-old female with extensive  history of urinary tract infections, here now for examination of her  left side. DESCRIPTION OF PROCEDURE:  The patient was taken back to the operating  room after informed consent including all risks, benefits, and  alternatives were obtained. The patient was transferred from the [facility]  onto the operating room table, where she was induced under general  anesthesia, and given IV Ancef for preoperative antibiotic prophylaxis. To begin the case, she was prepped and draped in the normal sterile  fashion. She was placed in the dorsal lithotomy. She had a 22-[place]awk  sheath with a 30-degree lens passed through the urethra into the  bladder.   Once in the bladder, we did examine the bladder. There were  duplications from the _____ duplicated ureters. _____ in line in the  interureteric ridge. We then could see a left-sided opening. We then  went into this, cannulated this with a 0.035-inch Glidewire. We were  able to then insert a _____. We then inserted the ureteroscope up into  the kidney and we shoot dye up _____ and it did appear to be missing the  upper pole. We went back in and examined the bladder and once this was  done, we were able to eventually using the ureteroscope, we were able to  find the upper pole of the ureteral orifice. This was at the bladder  neck near the urethra. We were able to drive the ureteroscope up. We  inserted the Glidewire up and did shoot dye up and saw an extremely  dilated system on the right side. Once this was done, we were able to  then remove the scope leaving the Glidewire in place. We placed the  cystoscope over the Glidewire and placed a 6-Macedonian x 26-cm double-J  ureteral stent. We then removed the wire and the proximal curl did come  down into the mid ureter due to the capacious nature of the system and  the distal curl was hanging out of the urethra due to the ectopic  location. At this point in time, it was decided not to leave the stent. We just removed the stent and drained the bladder. We used a 2%  lidocaine Uro-Jet for local anesthetic. She was then awoken from  general anesthesia, transferred to the [facility], and taken to the PACU in  satisfactory condition by Nursing and Anesthesia Teams. PLAN:  The patient will be discharged home per PACU criterion. She will  follow up with us in one to two weeks, where we can make surgical plan. I do think the patient would benefit from left-sided partial nephrectomy  removing the upper pole as well as the upper pole of the ureter _____.         Maria Ines San    D: 11/09/2022 3:10:59       T: 11/09/2022 9:39:11     [email or web address]  Job#: [id number]     Doc#: [id number]    CC:

## 2022-11-10 ENCOUNTER — APPOINTMENT (OUTPATIENT)
Dept: CT IMAGING | Age: 57
DRG: 660 | End: 2022-11-10
Payer: COMMERCIAL

## 2022-11-10 ENCOUNTER — TELEPHONE (OUTPATIENT)
Dept: UROLOGY | Age: 57
End: 2022-11-10

## 2022-11-10 ENCOUNTER — APPOINTMENT (OUTPATIENT)
Dept: GENERAL RADIOLOGY | Age: 57
DRG: 660 | End: 2022-11-10
Payer: COMMERCIAL

## 2022-11-10 ENCOUNTER — HOSPITAL ENCOUNTER (INPATIENT)
Age: 57
LOS: 3 days | Discharge: HOME | DRG: 660 | End: 2022-11-13
Attending: EMERGENCY MEDICINE | Admitting: STUDENT IN AN ORGANIZED HEALTH CARE EDUCATION/TRAINING PROGRAM
Payer: COMMERCIAL

## 2022-11-10 DIAGNOSIS — N39.0 RECURRENT UTI: Primary | ICD-10-CM

## 2022-11-10 LAB
ABSOLUTE EOS #: 0.1 K/UL (ref 0–0.44)
ABSOLUTE IMMATURE GRANULOCYTE: 0.04 K/UL (ref 0–0.3)
ABSOLUTE LYMPH #: 1.92 K/UL (ref 1.1–3.7)
ABSOLUTE MONO #: 1.3 K/UL (ref 0.1–1.2)
ANION GAP SERPL CALCULATED.3IONS-SCNC: 13 MMOL/L (ref 9–17)
BACTERIA: ABNORMAL
BASOPHILS # BLD: 1 % (ref 0–2)
BASOPHILS ABSOLUTE: 0.05 K/UL (ref 0–0.2)
BILIRUBIN URINE: NEGATIVE
BUN BLDV-MCNC: 9 MG/DL (ref 6–20)
BUN/CREAT BLD: 14 (ref 9–20)
CALCIUM SERPL-MCNC: 9.2 MG/DL (ref 8.6–10.4)
CHLORIDE BLD-SCNC: 104 MMOL/L (ref 98–107)
CO2: 24 MMOL/L (ref 20–31)
COLOR: YELLOW
CREAT SERPL-MCNC: 0.63 MG/DL (ref 0.5–0.9)
EOSINOPHILS RELATIVE PERCENT: 1 % (ref 1–4)
EPITHELIAL CELLS UA: ABNORMAL /HPF (ref 0–25)
FLU A ANTIGEN: NEGATIVE
FLU B ANTIGEN: NEGATIVE
GFR SERPL CREATININE-BSD FRML MDRD: >60 ML/MIN/1.73M2
GLUCOSE BLD-MCNC: 100 MG/DL (ref 70–99)
GLUCOSE URINE: NEGATIVE
HCT VFR BLD CALC: 40.7 % (ref 36.3–47.1)
HEMOGLOBIN: 13.4 G/DL (ref 11.9–15.1)
IMMATURE GRANULOCYTES: 0 %
KETONES, URINE: NEGATIVE
LACTIC ACID: 0.9 MMOL/L (ref 0.5–2.2)
LEUKOCYTE ESTERASE, URINE: ABNORMAL
LYMPHOCYTES # BLD: 18 % (ref 24–43)
MCH RBC QN AUTO: 29.8 PG (ref 25.2–33.5)
MCHC RBC AUTO-ENTMCNC: 32.9 G/DL (ref 28.4–34.8)
MCV RBC AUTO: 90.6 FL (ref 82.6–102.9)
MONOCYTES # BLD: 12 % (ref 3–12)
NITRITE, URINE: POSITIVE
NRBC AUTOMATED: 0 PER 100 WBC
PDW BLD-RTO: 12.9 % (ref 11.8–14.4)
PH UA: 6 (ref 5–9)
PLATELET # BLD: 309 K/UL (ref 138–453)
PMV BLD AUTO: 10.5 FL (ref 8.1–13.5)
POTASSIUM SERPL-SCNC: 3.8 MMOL/L (ref 3.7–5.3)
PROTEIN UA: NEGATIVE
RBC # BLD: 4.49 M/UL (ref 3.95–5.11)
RBC UA: ABNORMAL /HPF (ref 0–2)
SARS-COV-2, RAPID: NOT DETECTED
SEG NEUTROPHILS: 68 % (ref 36–65)
SEGMENTED NEUTROPHILS ABSOLUTE COUNT: 7.51 K/UL (ref 1.5–8.1)
SODIUM BLD-SCNC: 141 MMOL/L (ref 135–144)
SPECIFIC GRAVITY UA: 1.02 (ref 1.01–1.02)
SPECIMEN DESCRIPTION: NORMAL
TURBIDITY: CLEAR
URINE HGB: ABNORMAL
UROBILINOGEN, URINE: NORMAL
WBC # BLD: 10.9 K/UL (ref 3.5–11.3)
WBC UA: ABNORMAL /HPF (ref 0–5)

## 2022-11-10 PROCEDURE — 1200000000 HC SEMI PRIVATE

## 2022-11-10 PROCEDURE — 36415 COLL VENOUS BLD VENIPUNCTURE: CPT

## 2022-11-10 PROCEDURE — 6360000002 HC RX W HCPCS: Performed by: EMERGENCY MEDICINE

## 2022-11-10 PROCEDURE — 71045 X-RAY EXAM CHEST 1 VIEW: CPT

## 2022-11-10 PROCEDURE — 87804 INFLUENZA ASSAY W/OPTIC: CPT

## 2022-11-10 PROCEDURE — 81001 URINALYSIS AUTO W/SCOPE: CPT

## 2022-11-10 PROCEDURE — 96365 THER/PROPH/DIAG IV INF INIT: CPT

## 2022-11-10 PROCEDURE — 87635 SARS-COV-2 COVID-19 AMP PRB: CPT

## 2022-11-10 PROCEDURE — 87077 CULTURE AEROBIC IDENTIFY: CPT

## 2022-11-10 PROCEDURE — 94761 N-INVAS EAR/PLS OXIMETRY MLT: CPT

## 2022-11-10 PROCEDURE — 80048 BASIC METABOLIC PNL TOTAL CA: CPT

## 2022-11-10 PROCEDURE — 87040 BLOOD CULTURE FOR BACTERIA: CPT

## 2022-11-10 PROCEDURE — 74177 CT ABD & PELVIS W/CONTRAST: CPT

## 2022-11-10 PROCEDURE — 87086 URINE CULTURE/COLONY COUNT: CPT

## 2022-11-10 PROCEDURE — 99285 EMERGENCY DEPT VISIT HI MDM: CPT

## 2022-11-10 PROCEDURE — 85025 COMPLETE CBC W/AUTO DIFF WBC: CPT

## 2022-11-10 PROCEDURE — 6360000004 HC RX CONTRAST MEDICATION: Performed by: EMERGENCY MEDICINE

## 2022-11-10 PROCEDURE — 2580000003 HC RX 258: Performed by: EMERGENCY MEDICINE

## 2022-11-10 PROCEDURE — 87186 SC STD MICRODIL/AGAR DIL: CPT

## 2022-11-10 PROCEDURE — 2580000003 HC RX 258: Performed by: STUDENT IN AN ORGANIZED HEALTH CARE EDUCATION/TRAINING PROGRAM

## 2022-11-10 PROCEDURE — 83605 ASSAY OF LACTIC ACID: CPT

## 2022-11-10 PROCEDURE — 6370000000 HC RX 637 (ALT 250 FOR IP): Performed by: EMERGENCY MEDICINE

## 2022-11-10 RX ORDER — SODIUM CHLORIDE 9 MG/ML
INJECTION, SOLUTION INTRAVENOUS CONTINUOUS
Status: DISCONTINUED | OUTPATIENT
Start: 2022-11-10 | End: 2022-11-12

## 2022-11-10 RX ORDER — ACETAMINOPHEN 500 MG
1000 TABLET ORAL ONCE
Status: COMPLETED | OUTPATIENT
Start: 2022-11-10 | End: 2022-11-10

## 2022-11-10 RX ORDER — 0.9 % SODIUM CHLORIDE 0.9 %
1000 INTRAVENOUS SOLUTION INTRAVENOUS ONCE
Status: COMPLETED | OUTPATIENT
Start: 2022-11-10 | End: 2022-11-10

## 2022-11-10 RX ORDER — ACETAMINOPHEN 650 MG/1
650 SUPPOSITORY RECTAL EVERY 6 HOURS PRN
Status: DISCONTINUED | OUTPATIENT
Start: 2022-11-10 | End: 2022-11-13 | Stop reason: HOSPADM

## 2022-11-10 RX ORDER — ONDANSETRON 2 MG/ML
4 INJECTION INTRAMUSCULAR; INTRAVENOUS EVERY 6 HOURS PRN
Status: DISCONTINUED | OUTPATIENT
Start: 2022-11-10 | End: 2022-11-13 | Stop reason: HOSPADM

## 2022-11-10 RX ORDER — ACETAMINOPHEN 325 MG/1
650 TABLET ORAL EVERY 6 HOURS PRN
Status: DISCONTINUED | OUTPATIENT
Start: 2022-11-10 | End: 2022-11-13 | Stop reason: HOSPADM

## 2022-11-10 RX ORDER — SODIUM CHLORIDE 0.9 % (FLUSH) 0.9 %
5-40 SYRINGE (ML) INJECTION PRN
Status: DISCONTINUED | OUTPATIENT
Start: 2022-11-10 | End: 2022-11-13 | Stop reason: HOSPADM

## 2022-11-10 RX ORDER — DILTIAZEM HYDROCHLORIDE 120 MG/1
120 CAPSULE, COATED, EXTENDED RELEASE ORAL DAILY
Status: DISCONTINUED | OUTPATIENT
Start: 2022-11-11 | End: 2022-11-12

## 2022-11-10 RX ORDER — SODIUM CHLORIDE 9 MG/ML
25 INJECTION, SOLUTION INTRAVENOUS PRN
Status: DISCONTINUED | OUTPATIENT
Start: 2022-11-10 | End: 2022-11-13 | Stop reason: HOSPADM

## 2022-11-10 RX ORDER — SODIUM CHLORIDE 0.9 % (FLUSH) 0.9 %
5-40 SYRINGE (ML) INJECTION EVERY 12 HOURS SCHEDULED
Status: DISCONTINUED | OUTPATIENT
Start: 2022-11-10 | End: 2022-11-13 | Stop reason: HOSPADM

## 2022-11-10 RX ORDER — ONDANSETRON 4 MG/1
4 TABLET, ORALLY DISINTEGRATING ORAL EVERY 8 HOURS PRN
Status: DISCONTINUED | OUTPATIENT
Start: 2022-11-10 | End: 2022-11-13 | Stop reason: HOSPADM

## 2022-11-10 RX ORDER — POLYETHYLENE GLYCOL 3350 17 G/17G
17 POWDER, FOR SOLUTION ORAL DAILY PRN
Status: DISCONTINUED | OUTPATIENT
Start: 2022-11-10 | End: 2022-11-13 | Stop reason: HOSPADM

## 2022-11-10 RX ORDER — SPIRONOLACTONE 25 MG/1
25 TABLET ORAL DAILY
Status: DISCONTINUED | OUTPATIENT
Start: 2022-11-11 | End: 2022-11-13 | Stop reason: HOSPADM

## 2022-11-10 RX ADMIN — MEROPENEM 1000 MG: 1 INJECTION, POWDER, FOR SOLUTION INTRAVENOUS at 19:18

## 2022-11-10 RX ADMIN — SODIUM CHLORIDE: 9 INJECTION, SOLUTION INTRAVENOUS at 21:36

## 2022-11-10 RX ADMIN — ACETAMINOPHEN 1000 MG: 500 TABLET ORAL at 18:58

## 2022-11-10 RX ADMIN — SODIUM CHLORIDE, PRESERVATIVE FREE 10 ML: 5 INJECTION INTRAVENOUS at 21:34

## 2022-11-10 RX ADMIN — SODIUM CHLORIDE 1000 ML: 9 INJECTION, SOLUTION INTRAVENOUS at 18:41

## 2022-11-10 RX ADMIN — IOPAMIDOL 75 ML: 755 INJECTION, SOLUTION INTRAVENOUS at 19:06

## 2022-11-10 ASSESSMENT — ENCOUNTER SYMPTOMS
NAUSEA: 1
ABDOMINAL DISTENTION: 0
VOMITING: 0
SORE THROAT: 0
BACK PAIN: 0
SHORTNESS OF BREATH: 0
DIARRHEA: 0

## 2022-11-10 ASSESSMENT — PAIN SCALES - GENERAL
PAINLEVEL_OUTOF10: 6
PAINLEVEL_OUTOF10: 0

## 2022-11-10 ASSESSMENT — PAIN DESCRIPTION - PAIN TYPE: TYPE: ACUTE PAIN

## 2022-11-10 ASSESSMENT — PAIN DESCRIPTION - LOCATION: LOCATION: BACK

## 2022-11-10 ASSESSMENT — PAIN - FUNCTIONAL ASSESSMENT: PAIN_FUNCTIONAL_ASSESSMENT: 0-10

## 2022-11-10 ASSESSMENT — PAIN DESCRIPTION - DESCRIPTORS: DESCRIPTORS: DISCOMFORT

## 2022-11-10 NOTE — ED PROVIDER NOTES
[facility] ED  EMERGENCY DEPARTMENT ENCOUNTER      Pt Name: Dina Leggett  MRN: [id number]  [place] 1965  Date of evaluation: 11/10/2022  Provider: New Olsen DO    CHIEF COMPLAINT       Chief Complaint   Patient presents with    Fever     Had outpatient procedure done Tuesday with Dr Real Backers Danae Schwab   (Location/Symptom, Timing/Onset, Context/Setting, Quality, Duration, Modifying Factors, Severity)  Note limiting factors. Dina Leggett is a 61 y.o. female who presents to the emergency department complains of fever and fatigue that started earlier today. Patient states that she just had a procedure done by Dr. Arnoldo Nguyen 2 days ago due to having recurrent urinary tract infections and hydronephrosis. She states that they were planning to put a stent in but they were unable to so the artery disease plan was to schedule her for future partial nephrectomy. 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 She states that the few weeks ago she was on IV antibiotics for 2 weeks and had a PICC line in her. Today she had a fever of 100.8 and has been having some generalized body aches. She states that she has been nauseous but denies any vomiting. She has some urinary frequency but denies any burning. She did see some blood post the procedure but that has cleared. Today her urine was clear according to the patient. She denies any flank pain or abdominal pain. Patient also complains of some shortness of breath sometimes with a little bit of cough. She denies any other concerns. Patient has been vaccinated against COVID-19. Based on Dr. 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 REVIEW OF SYSTEMS    (2-9 systems for level 4, 10 or more for level 5)     Review of Systems   Constitutional:  Positive for fatigue and fever. HENT:  Negative for congestion and sore throat.     Eyes:  Negative for visual disturbance. Respiratory:  Negative for shortness of breath. Cardiovascular:  Negative for chest pain and palpitations. Gastrointestinal:  Positive for nausea. Negative for abdominal distention, diarrhea and vomiting. Genitourinary:  Negative for dysuria. Musculoskeletal:  Positive for myalgias. Negative for back pain. Skin:  Negative for rash. Psychiatric/Behavioral:  Negative for suicidal ideas. Except as noted above the remainder of the review of systems was reviewed and negative. PAST MEDICAL HISTORY     Past Medical History:   Diagnosis Date    Bell's palsy 2013    Carpal tunnel syndrome     Chicken pox     Hydronephrosis 2009    left     Hyperlipidemia 2013    Pneumonia     Renal atrophy     UTI (urinary tract infection)          SURGICAL HISTORY       Past Surgical History:   Procedure Laterality Date     SECTION      COLONOSCOPY N/A 2021    COLONOSCOPY performed by Merlyn Staley MD at [address] [facility]      CYSTOSCOPY Left 2022    CYSTOSCOPY performed by Vj Zelaya MD at [address] Left 2022    CYSTOSCOPY URETERAL STENT INSERTION performed by Vj Zelaya MD at [facility], [address] (CERVIX REMOVED)      ROTATOR CUFF REPAIR Left 2017    URETER SURGERY Left 2022    URETEROSCOPY performed by Vj Zelaya MD at [address] [place] [address]       Previous Medications    CRANBERRY CONCENTRATE PO    Take by mouth    DILTIAZEM (CARDIZEM CD) 120 MG EXTENDED RELEASE CAPSULE    Take 1 capsule by mouth in the morning. ESTRADIOL (ESTRACE VAGINAL) 0.1 MG/GM VAGINAL CREAM    Place a pea-sized amount vaginally daily x 2 weeks, then 3 times per week thereafter. Use finger to apply, wash hands after application. DO NOT use plastic applicator. Call office if you stop this medication.     FLUTICASONE (FLONASE) 50 MCG/ACT NASAL SPRAY    USE 2 SPRAYS IN EACH NOSTRIL EVERYDAY SPIRONOLACTONE (ALDACTONE) 25 MG TABLET    TAKE 1 TABLET BY MOUTH EVERY DAY       ALLERGIES     Sulfa antibiotics    FAMILY HISTORY       Family History   Problem Relation Age of Onset    Cancer Mother         breast    Heart Disease Mother     Stroke Mother     Cancer Father           SOCIAL HISTORY       Social History     Socioeconomic History    Marital status:      Spouse name: None    Number of children: None    Years of education: None    Highest education level: None   Tobacco Use    Smoking status: Never    Smokeless tobacco: Never   Vaping Use    Vaping Use: Never used   Substance and Sexual Activity    Alcohol use: No     Alcohol/week: 0.0 standard drinks    Drug use: No    Sexual activity: Yes     Partners: Male     Social Determinants of Health     Financial Resource Strain: Low Risk     Difficulty of Paying Living Expenses: Not hard at all   Food Insecurity: No Food Insecurity    Worried About [employer] in the Last Year: Never true    Ran Out of Food in the Last Year: Never true       SCREENINGS                        PHYSICAL EXAM    (up to 7 for level 4, 8 or more for level 5)     ED Triage Vitals [11/10/22 1746]   BP Temp Temp Source Heart Rate Resp SpO2 Height Weight   (!) 145/75 98.4 °F (36.9 °C) Oral 89 18 98 % -- --       Physical Exam  Constitutional:       General: She is not in acute distress. Appearance: Normal appearance. She is not toxic-appearing. HENT:      Head: Normocephalic and atraumatic. Mouth/Throat:      Mouth: Mucous membranes are moist.   Eyes:      Extraocular Movements: Extraocular movements intact. Pupils: Pupils are equal, round, and reactive to light. Cardiovascular:      Rate and Rhythm: Normal rate and regular rhythm. Pulses: Normal pulses. Heart sounds: Normal heart sounds. Pulmonary:      Effort: Pulmonary effort is normal.      Breath sounds: Normal breath sounds. Abdominal:      General: Abdomen is flat.  Bowel sounds are normal.      Palpations: Abdomen is soft. Musculoskeletal:         General: Normal range of motion. Skin:     General: Skin is warm and dry. Capillary Refill: Capillary refill takes less than 2 seconds. Neurological:      General: No focal deficit present. Mental Status: She is alert and oriented to person, place, and time.    Psychiatric:         Mood and Affect: Mood normal.       DIAGNOSTIC RESULTS     EKG: All EKG's are interpreted by the Emergency Department Physician who either signs or Co-signs this chart in the absence of a cardiologist.        RADIOLOGY:   Non-plain film images such as CT, Ultrasound and MRI are read by the radiologist. Plain radiographic images are visualized and preliminarily interpreted by the emergency physician with the below findings:      Interpretation per the Radiologist below, if available at the time of this note:    XR CHEST PORTABLE    (Results Pending)   CT ABDOMEN PELVIS W IV CONTRAST Additional Contrast? None    (Results Pending)       LABS:  Labs Reviewed   CBC WITH AUTO DIFFERENTIAL - Abnormal; Notable for the following components:       Result Value    Seg Neutrophils 68 (*)     Lymphocytes 18 (*)     Absolute Mono # 1.30 (*)     All other components within normal limits   BASIC METABOLIC PANEL - Abnormal; Notable for the following components:    Glucose 100 (*)     All other components within normal limits   URINALYSIS WITH REFLEX TO CULTURE - Abnormal; Notable for the following components:    Urine Hgb 1+ (*)     Nitrite, Urine POSITIVE (*)     Leukocyte Esterase, Urine LARGE (*)     All other components within normal limits   MICROSCOPIC URINALYSIS - Abnormal; Notable for the following components:    Bacteria, UA 3+ (*)     All other components within normal limits   COVID-19, RAPID   RAPID INFLUENZA A/B ANTIGENS   CULTURE, BLOOD 1   CULTURE, BLOOD 2   CULTURE, URINE   LACTIC ACID       All other labs were within normal range or not returned as of this dictation. EMERGENCY DEPARTMENT COURSE and DIFFERENTIAL DIAGNOSIS/MDM:   Vitals:    Vitals:    11/10/22 1746   BP: (!) 145/75   Pulse: 89   Resp: 18   Temp: 98.4 °F (36.9 °C)   TempSrc: Oral   SpO2: 98%       REASSESSMENT        Patient started on meropenem given her previous urine cultures show sensitivity to beta-lactamase. Given recent instrumentation I also ordered a CT scan of her abdomen to rule out any intra-abdominal abscess or infection. At this time the care will be transitioned to the night doctor. FINAL IMPRESSION      1.  Recurrent UTI          DISPOSITION/PLAN   DISPOSITION          (Please note that portions of this note were completed with a voice recognition program.  Efforts were made to edit the dictations but occasionally words are mis-transcribed.)    Patricia Burnett DO (electronically signed)  Attending Emergency Physician           Patricia Burnett DO  11/10/22 [address] [place] [address]  11/10/22 [facility]

## 2022-11-10 NOTE — TELEPHONE ENCOUNTER
Patient said Dr Erendira Mcguire couldn't get her stent in Tuesday. Now she has fever 100.8 and her whole body feels like she is dying. She sees [facility] on Monday but she will have a long weekend.

## 2022-11-11 PROBLEM — I47.10 SVT (SUPRAVENTRICULAR TACHYCARDIA): Status: ACTIVE | Noted: 2022-11-11

## 2022-11-11 PROBLEM — I47.1 SVT (SUPRAVENTRICULAR TACHYCARDIA) (HCC): Status: ACTIVE | Noted: 2022-11-11

## 2022-11-11 PROBLEM — M79.10 MYALGIA: Status: ACTIVE | Noted: 2022-11-11

## 2022-11-11 LAB
ABSOLUTE EOS #: 0.16 K/UL (ref 0–0.44)
ABSOLUTE IMMATURE GRANULOCYTE: 0.04 K/UL (ref 0–0.3)
ABSOLUTE LYMPH #: 1.64 K/UL (ref 1.1–3.7)
ABSOLUTE MONO #: 1.02 K/UL (ref 0.1–1.2)
ANION GAP SERPL CALCULATED.3IONS-SCNC: 11 MMOL/L (ref 9–17)
BASOPHILS # BLD: 1 % (ref 0–2)
BASOPHILS ABSOLUTE: 0.05 K/UL (ref 0–0.2)
BUN BLDV-MCNC: 8 MG/DL (ref 6–20)
BUN/CREAT BLD: 17 (ref 9–20)
C-REACTIVE PROTEIN: 70.6 MG/L (ref 0–5)
CALCIUM SERPL-MCNC: 9 MG/DL (ref 8.6–10.4)
CHLORIDE BLD-SCNC: 108 MMOL/L (ref 98–107)
CO2: 22 MMOL/L (ref 20–31)
CREAT SERPL-MCNC: 0.47 MG/DL (ref 0.5–0.9)
EOSINOPHILS RELATIVE PERCENT: 2 % (ref 1–4)
GFR SERPL CREATININE-BSD FRML MDRD: >60 ML/MIN/1.73M2
GLUCOSE BLD-MCNC: 111 MG/DL (ref 70–99)
HCT VFR BLD CALC: 38.5 % (ref 36.3–47.1)
HEMOGLOBIN: 12.7 G/DL (ref 11.9–15.1)
IGG: 738 MG/DL (ref 700–1600)
IGM: <25 MG/DL (ref 40–230)
IMMATURE GRANULOCYTES: 1 %
LYMPHOCYTES # BLD: 19 % (ref 24–43)
MCH RBC QN AUTO: 30.1 PG (ref 25.2–33.5)
MCHC RBC AUTO-ENTMCNC: 33 G/DL (ref 28.4–34.8)
MCV RBC AUTO: 91.2 FL (ref 82.6–102.9)
MONOCYTES # BLD: 12 % (ref 3–12)
NRBC AUTOMATED: 0 PER 100 WBC
PDW BLD-RTO: 13 % (ref 11.8–14.4)
PLATELET # BLD: 275 K/UL (ref 138–453)
PMV BLD AUTO: 10.7 FL (ref 8.1–13.5)
POTASSIUM SERPL-SCNC: 3.9 MMOL/L (ref 3.7–5.3)
RBC # BLD: 4.22 M/UL (ref 3.95–5.11)
SEDIMENTATION RATE, ERYTHROCYTE: 23 MM/HR (ref 0–30)
SEG NEUTROPHILS: 65 % (ref 36–65)
SEGMENTED NEUTROPHILS ABSOLUTE COUNT: 5.76 K/UL (ref 1.5–8.1)
SODIUM BLD-SCNC: 141 MMOL/L (ref 135–144)
TSH SERPL DL<=0.05 MIU/L-ACNC: 1.16 UIU/ML (ref 0.3–5)
WBC # BLD: 8.7 K/UL (ref 3.5–11.3)

## 2022-11-11 PROCEDURE — 86147 CARDIOLIPIN ANTIBODY EA IG: CPT

## 2022-11-11 PROCEDURE — 86038 ANTINUCLEAR ANTIBODIES: CPT

## 2022-11-11 PROCEDURE — 82784 ASSAY IGA/IGD/IGG/IGM EACH: CPT

## 2022-11-11 PROCEDURE — 86140 C-REACTIVE PROTEIN: CPT

## 2022-11-11 PROCEDURE — 85610 PROTHROMBIN TIME: CPT

## 2022-11-11 PROCEDURE — 84443 ASSAY THYROID STIM HORMONE: CPT

## 2022-11-11 PROCEDURE — 80048 BASIC METABOLIC PNL TOTAL CA: CPT

## 2022-11-11 PROCEDURE — 86225 DNA ANTIBODY NATIVE: CPT

## 2022-11-11 PROCEDURE — 1200000000 HC SEMI PRIVATE

## 2022-11-11 PROCEDURE — 85025 COMPLETE CBC W/AUTO DIFF WBC: CPT

## 2022-11-11 PROCEDURE — 36415 COLL VENOUS BLD VENIPUNCTURE: CPT

## 2022-11-11 PROCEDURE — 85613 RUSSELL VIPER VENOM DILUTED: CPT

## 2022-11-11 PROCEDURE — 85730 THROMBOPLASTIN TIME PARTIAL: CPT

## 2022-11-11 PROCEDURE — 94761 N-INVAS EAR/PLS OXIMETRY MLT: CPT

## 2022-11-11 PROCEDURE — 6360000002 HC RX W HCPCS: Performed by: EMERGENCY MEDICINE

## 2022-11-11 PROCEDURE — 84165 PROTEIN E-PHORESIS SERUM: CPT

## 2022-11-11 PROCEDURE — 6370000000 HC RX 637 (ALT 250 FOR IP): Performed by: STUDENT IN AN ORGANIZED HEALTH CARE EDUCATION/TRAINING PROGRAM

## 2022-11-11 PROCEDURE — 2580000003 HC RX 258: Performed by: EMERGENCY MEDICINE

## 2022-11-11 PROCEDURE — 85652 RBC SED RATE AUTOMATED: CPT

## 2022-11-11 PROCEDURE — 84155 ASSAY OF PROTEIN SERUM: CPT

## 2022-11-11 RX ADMIN — MEROPENEM 1000 MG: 1 INJECTION, POWDER, FOR SOLUTION INTRAVENOUS at 10:55

## 2022-11-11 RX ADMIN — SPIRONOLACTONE 25 MG: 25 TABLET, FILM COATED ORAL at 08:24

## 2022-11-11 RX ADMIN — MEROPENEM 1000 MG: 1 INJECTION, POWDER, FOR SOLUTION INTRAVENOUS at 02:28

## 2022-11-11 RX ADMIN — DILTIAZEM HYDROCHLORIDE 120 MG: 120 CAPSULE, COATED, EXTENDED RELEASE ORAL at 08:24

## 2022-11-11 RX ADMIN — MEROPENEM 1000 MG: 1 INJECTION, POWDER, FOR SOLUTION INTRAVENOUS at 19:34

## 2022-11-11 ASSESSMENT — PAIN SCALES - GENERAL: PAINLEVEL_OUTOF10: 0

## 2022-11-11 NOTE — ED PROVIDER NOTES
Emergency Department Encounter  Location: [address] [place] [address] ED    Patient: Clydie Mohs  MRN: [id number]  : 1965  Date of evaluation: 11/10/2022  ED Provider: Aroldo Hyman MD    1800  Clydie Mohs was checked out to me by Dr Marquise Zaragoza. Please see his/her initial documentation for details of the patient's initial ED presentation, physical exam and completed studies. In brief, Clydie Mohs is a 61 y.o. female with medical history significant for hyperlipidemia, hydronephrosis, frequent UTIs, with s/p cystoscopy this week that presented to the emergency department for evaluation for urinary symptoms with fevers and chills. Final ED Course and MDM:    In the emergency department was concerning for UTI with urine with nitrates which patient was started on meropenem given previous culture results. CT of the abdomen pelvis with postsurgical changes with a new area of fat stranding in the left ureteric area. Discussed lab and imaging results with the patient. Discussed with patient plan for admission for IV antibiotics while we wait for culture results. Patient verbalized understanding of information given and agreed with plan. Was admitted in stable condition. I have reviewed and interpreted all of the currently available lab results and diagnostics from this visit:  Results for orders placed or performed during the hospital encounter of 11/10/22   COVID-19, Rapid    Specimen: Nasopharyngeal Swab   Result Value Ref Range    Specimen Description . NASOPHARYNGEAL SWAB     SARS-CoV-2, Rapid Not Detected Not Detected   Rapid influenza A/B antigens    Specimen: Nasopharyngeal   Result Value Ref Range    Flu A Antigen NEGATIVE NEGATIVE    Flu B Antigen NEGATIVE NEGATIVE   CBC with Auto Differential   Result Value Ref Range    WBC 10.9 3.5 - 11.3 k/uL    RBC 4.49 3.95 - 5.11 m/uL    Hemoglobin 13.4 11.9 - 15.1 g/dL    Hematocrit 40.7 36.3 - 47.1 %    MCV 90.6 82.6 - 102.9 fL    MCH 29.8 25.2 - 33.5 pg MCHC 32.9 28.4 - 34.8 g/dL    RDW 12.9 11.8 - 14.4 %    Platelets 476 787 - 508 k/uL    MPV 10.5 8.1 - 13.5 fL    NRBC Automated 0.0 0.0 per 100 WBC    Seg Neutrophils 68 (H) 36 - 65 %    Lymphocytes 18 (L) 24 - 43 %    Monocytes 12 3 - 12 %    Eosinophils % 1 1 - 4 %    Basophils 1 0 - 2 %    Immature Granulocytes 0 0 %    Segs Absolute 7.51 1.50 - 8.10 k/uL    Absolute Lymph # 1.92 1.10 - 3.70 k/uL    Absolute Mono # 1.30 (H) 0.10 - 1.20 k/uL    Absolute Eos # 0.10 0.00 - 0.44 k/uL    Basophils Absolute 0.05 0.00 - 0.20 k/uL    Absolute Immature Granulocyte 0.04 0.00 - 0.30 k/uL   BMP   Result Value Ref Range    Glucose 100 (H) 70 - 99 mg/dL    BUN 9 6 - 20 mg/dL    Creatinine 0.63 0.50 - 0.90 mg/dL    Est, Glom Filt Rate >60 >60 mL/min/1.73m2    Bun/Cre Ratio 14 9 - 20    Calcium 9.2 8.6 - 10.4 mg/dL    Sodium 141 135 - 144 mmol/L    Potassium 3.8 3.7 - 5.3 mmol/L    Chloride 104 98 - 107 mmol/L    CO2 24 20 - 31 mmol/L    Anion Gap 13 9 - 17 mmol/L   Urinalysis with Reflex to Culture    Specimen: Urine   Result Value Ref Range    Color, UA Yellow Yellow    Turbidity UA Clear Clear    Glucose, Ur NEGATIVE NEGATIVE    Bilirubin Urine NEGATIVE NEGATIVE    Ketones, Urine NEGATIVE NEGATIVE    Specific Gravity, UA 1.020 1.010 - 1.020    Urine Hgb 1+ (A) NEGATIVE    pH, UA 6.0 5.0 - 9.0    Protein, UA NEGATIVE NEGATIVE    Urobilinogen, Urine Normal Normal    Nitrite, Urine POSITIVE (A) NEGATIVE    Leukocyte Esterase, Urine LARGE (A) NEGATIVE   Lactic Acid   Result Value Ref Range    Lactic Acid 0.9 0.5 - 2.2 mmol/L   Microscopic Urinalysis   Result Value Ref Range    WBC, UA 50  0 - 5 /HPF    RBC, UA 2 TO 5 0 - 2 /HPF    Epithelial Cells UA 2 TO 5 0 - 25 /HPF    Bacteria, UA 3+ (A) None     CT ABDOMEN PELVIS W IV CONTRAST Additional Contrast? None    Result Date: 11/10/2022  EXAMINATION: CT OF THE ABDOMEN AND PELVIS WITH CONTRAST 11/10/2022 4:10 pm TECHNIQUE: CT of the abdomen and pelvis was performed with the administration of intravenous contrast. Multiplanar reformatted images are provided for review. Automated exposure control, iterative reconstruction, and/or weight based adjustment of the mA/kV was utilized to reduce the radiation dose to as low as reasonably achievable. COMPARISON: 10/11/2022 HISTORY: ORDERING SYSTEM PROVIDED HISTORY: Abdominal pain; recent instrumentation of the left ureter TECHNOLOGIST PROVIDED HISTORY: Abdominal pain; recent instrumentation of the left ureter Decision Support Exception - unselect if not a suspected or confirmed emergency medical condition->Emergency Medical Condition (MA) FINDINGS: Lower Chest: The visualized lungs are clear. Base of the heart is unremarkable. Visualized extra thoracic soft tissues are unremarkable. Organs: Diffuse hepatic steatosis is suggested. No focal or acute hepatic abnormality identified. Spleen enhances normally. Gallbladder unremarkable. Adrenals unremarkable. Pancreas unremarkable. 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 There is chronic hydronephrosis of the upper pole moiety, as well as hydroureter of the distal upper pole moiety. Chronic renal cortical scarring of the upper moiety again noted. There is new fat stranding seen adjacent to the left kidney is well as adjacent to the lower pole moiety. There appears to be at least partial duplication of the right renal collecting system. No hydronephrosis on the right is found. GI/Bowel: Large bowel is unremarkable appearance. Appendix is normal. Small hiatal hernia. Distal esophagus and stomach otherwise unremarkable. Small bowel unremarkable. Pelvis: Cystic structure just to the left of the distal upper pole ureter again noted, unchanged. Peritoneum/Retroperitoneum: Abdominal aorta normal in caliber. No lymphadenopathy. Bones/Soft Tissues: No acute or suspicious bony abnormalities are identified.      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 There is new fat stranding seen adjacent to the left as well as the ureter of the upper moiety, suggesting urinary tract infection/inflammation. There is redemonstration of cystic structure in the lower pelvis, possibly a ureterocele versus nabothian cyst.     XR CHEST PORTABLE    Result Date: 11/10/2022  EXAMINATION: ONE XRAY VIEW OF THE CHEST 11/10/2022 3:28 pm COMPARISON: 09/05/2020 HISTORY: ORDERING SYSTEM PROVIDED HISTORY: sob TECHNOLOGIST PROVIDED HISTORY: sob FINDINGS: The cardial-pericardial silhouette is unremarkable in appearance. The lungs are clear. No pneumothorax is found. No free air is seen. No acute bony abnormality. Unremarkable portable chest radiograph. I    ED Medication Orders (From admission, onward)      Start Ordered     Status Ordering Provider    11/10/22 1906 11/10/22 1906  iopamidol (ISOVUE-370) 76 % injection 75 mL  IMG ONCE PRN         Last MAR action: Given - by IMELDA DUDLEY on 11/10/22 at 1906 Aman Filler    11/10/22 1900 11/10/22 1853  meropenem (MERREM) 1,000 mg in sodium chloride 0.9 % 100 mL IVPB (mini-bag)  EVERY 8 HOURS        Question:  Antimicrobial Indications  Answer:  Urinary Tract Infection    Last MAR action: Stopped - by Adam Galeas on 11/10/22 at 1947 Vickie BURGER    11/10/22 1830 11/10/22 1819  0.9 % sodium chloride bolus  ONCE         Last MAR action: Stopped - by Adam Pearcee on 11/10/22 at 2001 Vickie BURGER    11/10/22 1830 11/10/22 1819  acetaminophen (TYLENOL) tablet 1,000 mg  ONCE         Last MAR action: Given - by Adam Pearcee on 11/10/22 at 1858 Vickie BURGER            Final Impression      1.  Recurrent UTI        DISPOSITION Admitted 11/10/2022 08:52:52 PM     (Please note that portions of this note may have been completed with a voice recognition program. Efforts were made to edit the dictations but occasionally words are mis-transcribed.)    Usman Hutchison MD  [address] MD  11/10/22 0995

## 2022-11-11 NOTE — CARE COORDINATION
Discussed discharge plans with the patient. Patient is a 61year old female here with Recurrent UTI. She is alert , oriented , and pleasant during our conversation. Patient is  and lives at home with her . She uses a C-pap machine. Patient does the cooking and the house keeping. She is independent with her ADL's. Patient manages her own medications and drives. She has no outside services in the home. Her PCP is Dr. Kiki Beckham MD. She has medical insurance that helps with medications costs. Patient works a full time job at [employer]. The discharge plan is home with no services at this time. She does not have advance directives. LSW to monitor and assist with any needs or concerns as they arise. [facility]         11/11/22 1122   Service Assessment   Patient Orientation Alert and Oriented;Person;Place;Situation;Self   Cognition Alert   History Provided By Patient   Primary Caregiver Self   Support Systems Spouse/Significant Other;Family Members   Patient's Healthcare Decision Maker is: Legal Next of Kin   PCP Verified by CM Yes   Last Visit to PCP Within last 3 months   Prior Functional Level Independent in ADLs/IADLs   Current Functional Level Independent in ADLs/IADLs   Can patient return to prior living arrangement Yes   Ability to make needs known: Good   Would you like for me to discuss the discharge plan with any other family members/significant others, and if so, who? No   Financial Resources Other (Comment)  (commercial insurance)   Community Resources   (na)   Discharge Planning   Type of Residence [employer]   Living Arrangements Spouse/Significant Other   Current Services Prior To Admission C-pap   Potential Assistance Needed Durable Medical Equipment   Potential DME Needed Cpap   DME Ordered? No   Potential Assistance Purchasing Medications No   Type of Home Care Services None   Patient expects to be discharged to: House   History of falls?  0   Services At/After Discharge   Transition of Care Consult (CM Consult) Discharge Isabel 1690 Discharge None    Resource Information Provided?  No   Mode of Transport at Discharge [address] [place] [address]'

## 2022-11-11 NOTE — PROGRESS NOTES
Pt vitals and assessment completed at this time, see flowsheet. Pt A&O x4, breathing normal. Pt denies pain or any further needs at this time, pt resting comfortably in bed eating breakfast, call light within reach, will continue to monitor.

## 2022-11-11 NOTE — PLAN OF CARE
Problem: Discharge Planning  Goal: Discharge to home or other facility with appropriate resources  Outcome: Progressing  Flowsheets (Taken 11/11/2022 0100)  Discharge to home or other facility with appropriate resources: Identify barriers to discharge with patient and caregiver     Problem: Pain  Goal: Verbalizes/displays adequate comfort level or baseline comfort level  Outcome: Progressing  Flowsheets (Taken 11/11/2022 0100)  Verbalizes/displays adequate comfort level or baseline comfort level:   Encourage patient to monitor pain and request assistance   Assess pain using appropriate pain scale   Administer analgesics based on type and severity of pain and evaluate response   Implement non-pharmacological measures as appropriate and evaluate response   Consider cultural and social influences on pain and pain management   Notify Licensed Independent Practitioner if interventions unsuccessful or patient reports new pain

## 2022-11-11 NOTE — PROGRESS NOTES
Patient admitted to floor. Vitals taken and documented. Assessment completed and documented. Navigator completed. Patient oriented to room and call light system. Patient educated on physician's orders and medication to be given tonight. Patient encouraged to ask questions. Denies pain, needs or concerns at this time. Call light and over bed table within reach. Side rails up times two.

## 2022-11-11 NOTE — CONSULTS
Patient:  Phoenix Cueto  MRN: [id number]    Chief Complaint: fever    HISTORY OF PRESENT ILLNESS:   The patient is a 61 y.o. female who called our office yesterday with reports of a fever of 100.8. I did send her to the emergency department for further evaluation. On 2022 Dr. Vaibhav Isbell did attempt to place a left ureteral stent. Due to her duplicated system when the stent was placed to the distal curl was in the urethra. He did not leave a stent due to this. He did recommend referral to a tertiary facility for a partial nephrectomy. On admission there was no leukocytosis and renal function was normal.  She did have a CT scan completed. This film was independently reviewed and does show left hydroureteronephrosis. There is fat stranding around the left kidney and ureter. This can be indicative of infection or recent instrumentation. UA is positive for nitrites and leukocyte esterase. She is currently afebrile. Urine culture and blood cultures are pending. She states that she does feel better than she did yesterday. She denies nausea, vomiting.     Past Medical History:    Past Medical History:   Diagnosis Date    Bell's palsy 2013    Carpal tunnel syndrome     Chicken pox     Hydronephrosis 2009    left     Hyperlipidemia 2013    Pneumonia     Renal atrophy     UTI (urinary tract infection)        Past Surgical History:    Past Surgical History:   Procedure Laterality Date     SECTION      COLONOSCOPY N/A 2021    COLONOSCOPY performed by Lysle Meigs, MD at [address] [facility]      CYSTOSCOPY Left 2022    CYSTOSCOPY performed by Brain Infante MD at [address] Left 2022    CYSTOSCOPY URETERAL STENT INSERTION performed by Brain Infante MD at [address] [facility], [address] (CERVIX REMOVED)      ROTATOR CUFF REPAIR Left 2017    URETER SURGERY Left 2022    URETEROSCOPY performed by Brain Infante MD at [address] Medications:    Scheduled Meds:   meropenem  1,000 mg IntraVENous Q8H    dilTIAZem  120 mg Oral Daily    spironolactone  25 mg Oral Daily    sodium chloride flush  5-40 mL IntraVENous 2 times per day     Continuous Infusions:   sodium chloride 75 mL/hr at 11/10/22 2136    sodium chloride       PRN Meds:.sodium chloride flush, sodium chloride, ondansetron **OR** ondansetron, acetaminophen **OR** acetaminophen, polyethylene glycol    Allergies:    Sulfa antibiotics    Social History:    Social History     Socioeconomic History    Marital status:      Spouse name: Not on file    Number of children: Not on file    Years of education: Not on file    Highest education level: Not on file   Occupational History    Not on file   Tobacco Use    Smoking status: Never    Smokeless tobacco: Never   Vaping Use    Vaping Use: Never used   Substance and Sexual Activity    Alcohol use: No     Alcohol/week: 0.0 standard drinks    Drug use: No    Sexual activity: Yes     Partners: Male   Other Topics Concern    Not on file   Social History Narrative    Not on file     Social Determinants of Health     Financial Resource Strain: Low Risk     Difficulty of Paying Living Expenses: Not hard at all   Food Insecurity: No Food Insecurity    Worried About Running Out of Food in the Last Year: Never true    Ran Out of Food in the Last Year: Never true   Transportation Needs: Not on file   Physical Activity: Not on file   Stress: Not on file   Social Connections: Not on file   Intimate Partner Violence: Not on file   Housing Stability: Not on file       Family History:    Family History   Problem Relation Age of Onset    Cancer Mother         breast    Heart Disease Mother     Stroke Mother     Cancer Father        REVIEW OF SYSTEMS:  Constitutional: Negative for fever, chills and unexpected weight change. Respiratory: Negative for shortness of breath and wheezing. Cardiovascular: Negative for chest pain and palpitations. Gastrointestinal: Negative for nausea, vomiting, abdominal pain, diarrhea, constipation and abdominal distention. Endocrine: Negative for polydipsia and polyuria. Genitourinary: Negative for dysuria, urgency, frequency, hematuria, flank pain, enuresis, decreased urine volume, vaginal discharge, genital sores. Musculoskeletal: Negative for myalgias and joint swelling. Skin: Negative for rash and wound. Neurological: Negative for dizziness and headaches. Hematological: Negative for adenopathy. Does not bruise/bleed easily. PHYSICAL EXAM:  Patient Vitals for the past 24 hrs:   BP Temp Temp src Pulse Resp SpO2 Height Weight   11/11/22 0815 (!) 142/89 97.1 °F (36.2 °C) Temporal 69 18 97 % -- --   11/11/22 0400 -- -- -- 72 -- -- -- --   11/11/22 0300 -- -- -- 75 -- -- -- --   11/11/22 0215 (!) 147/78 97 °F (36.1 °C) Temporal 73 18 96 % -- --   11/11/22 0200 -- -- -- 73 -- -- -- --   11/11/22 0100 -- -- -- 78 -- -- -- --   11/11/22 0000 -- -- -- 92 -- -- -- --   11/10/22 2300 -- -- -- 87 -- -- -- --   11/10/22 2230 -- -- -- -- -- -- 5' 10\" (1.778 m) (!) 322 lb (146.1 kg)   11/10/22 2200 -- -- -- 83 -- -- -- --   11/10/22 2115 (!) 144/69 97.8 °F (36.6 °C) Temporal 79 16 97 % -- --   11/10/22 2100 123/70 -- -- -- -- -- -- --   11/10/22 2032 123/71 -- -- -- -- -- -- --   11/10/22 2001 127/60 -- -- -- -- -- -- --   11/10/22 1939 (!) 151/56 -- -- -- -- -- -- --   11/10/22 1746 (!) 145/75 98.4 °F (36.9 °C) Oral 89 18 98 % -- --     Constitutional: Patient in no acute distress. Neuro: Alert and oriented to person place and time. Psych: Mood and affect normal.  Skin: Warm, dry  Lungs: Respiratory effort normal  Cardiovascular:  Normal peripheral pulses  Abdomen: Soft, non-tender, non-distended with no organomegaly or palpable masses. No CVA tenderness. Bladder non-tender and not distended.   Lymphatics: No palpable lymphadenopathy      LABS:  Recent Labs     11/10/22  1839   WBC 10.9   HGB 13.4   HCT 40.7 MCV 90.6        Recent Labs     11/10/22  1839      K 3.8      CO2 24   BUN 9   CREATININE 0.63     No results found for: PSA    Urinalysis:   Recent Labs     11/10/22  1824   COLORU Yellow   PHUR 6.0   WBCUA 50    RBCUA 2 TO 5   BACTERIA 3+*   SPECGRAV 1.020   LEUKOCYTESUR LARGE*   UROBILINOGEN Normal   BILIRUBINUR NEGATIVE        Additional Lab/culture results:  none    Imaging Results:  none    ASSESSMENT AND PLAN:  Impression:    Patient Active Problem List   Diagnosis    Bell's palsy    Ureterocele    ITZ (obstructive sleep apnea)    Urinary tract infection due to ESBL Klebsiella    Urinary tract infection       Plan: Continue empiric antibiotics. Plan to discharge on oral antibiotics. She does have an appointment on Monday with [facility] urology for consultation for partial nephrectomy. We do feel that this will be most beneficial for her and prevention of her recurrent urinary tract infections.       Clemente Seth MD

## 2022-11-11 NOTE — PROGRESS NOTES
Comprehensive Nutrition Assessment    Type and Reason for Visit:  Initial    Nutrition Recommendations/Plan:   1,800 kcal, 4 CHO/meal diet, well balanced meals at home  Protein at each meal  Adequate fluids  Review of good dietary fats for joint mobility  Diet education on Omega 3 provided for pt      Malnutrition Assessment:  Malnutrition Status:  No malnutrition (11/11/22 1047)    Context:  Acute Illness     Findings of the 6 clinical characteristics of malnutrition:  Energy Intake:  Mild decrease in energy intake (Comment)  Weight Loss:  No significant weight loss     Body Fat Loss:  No significant body fat loss     Muscle Mass Loss:  No significant muscle mass loss    Fluid Accumulation:  No significant fluid accumulation     Strength:  Not Performed    Nutrition Assessment:    Altered nutrition-related lab values related to endocrine dysfuntion as evidenced by lab values. Pt reports not having a good appetite for 2 days, was in pain and did not feel like eating. Pt reports being nauseous and fatigued. She reports having a recurrent UTI and decreased eating because nothing sounded good. Pt states she feels early satiety and feels full. Note a weight gain of 11#. Pt educated on benefits of monounsaturated fats and joint mobility, noting history of elevated triglycerides and low HDL cholesterol. Mild elevation to blood glucose. Intermediate nutrition risk. No malnutrition. Nutrition Related Findings:    Obese, no edema Wound Type: None       Current Nutrition Intake & Therapies:    Average Meal Intake: NPO  Average Supplements Intake: NPO  ADULT DIET; Regular    Anthropometric Measures:  Height: 5' 10\" (177.8 cm)  Ideal Body Weight (IBW): 150 lbs (68 kg)    Admission Body Weight: 322 lb (146.1 kg)  Current Body Weight: 322 lb (146.1 kg), 214.7 % IBW.  Weight Source: Bed Scale  Current BMI (kg/m2): 46.2  Usual Body Weight: 311 lb (141.1 kg) (11/8/2022)  % Weight Change (Calculated): 3.5  Weight Adjustment For: No Adjustment                 BMI Categories: Obese Class 3 (BMI 40.0 or greater)    Estimated Daily Nutrient Needs:  Energy Requirements Based On: Kcal/kg  Weight Used for Energy Requirements: Admission  Energy (kcal/day): 5460-5926 (10-13)  Weight Used for Protein Requirements: Ideal  Protein (g/day): 81-95  Method Used for Fluid Requirements: 1 ml/kcal  Fluid (ml/day): 1800+    Nutrition Diagnosis:   Altered nutrition-related lab values related to endocrine dysfuntion as evidenced by lab values  Predicted inadequate energy intake related to early satiety, pain as evidenced by lab values    Recent Labs     11/10/22  1839 11/11/22  0840    141   K 3.8 3.9    108*   CO2 24 22   BUN 9 8   CREATININE 0.63 0.47*   GLUCOSE 100* 111*      Lab Results   Component Value Date/Time    LABALBU 4.6 09/26/2022 03:53 PM      Lab Results   Component Value Date/Time    TRIG 256 07/27/2022 11:46 AM    HDL 35 07/27/2022 11:46 AM    LDLCALC 92 02/03/2014 12:00 AM     Nutrition Interventions:   Food and/or Nutrient Delivery: Continue Current Diet  Nutrition Education/Counseling: Education completed  Coordination of Nutrition Care: Continue to monitor while inpatient  Plan of Care discussed with: Patient    Nutrition Education    Educated on lipids,improving cholesterol, and blood sugar  Learners: Patient  Readiness: Acceptance  Method: Explanation and Handout  Response: Verbalizes Understanding  Contact name and number provided. Goals:  Previous Goal Met: Progressing toward Goal(s)  Goals: Meet at least 75% of estimated needs       Nutrition Monitoring and Evaluation:   Behavioral-Environmental Outcomes: None Identified  Food/Nutrient Intake Outcomes: Food and Nutrient Intake, Supplement Intake  Physical Signs/Symptoms Outcomes: Biochemical Data, Nausea or Vomiting, Fluid Status or Edema, Weight    Discharge Planning:     Too soon to determine     Volodymyr Talbert RD, LD  Contact: [phone]

## 2022-11-11 NOTE — PROGRESS NOTES
[facility]  Inpatient/Observation/Outpatient Rehabilitation    Date: 2022  Patient Name: Viral Winston       [] Inpatient Acute/Observation       []  Outpatient  : 1965       [] Pt no showed for scheduled appointment    [] Pt refused/declined therapy at this time due to:           [x] Pt cancelled due to:  [] No Reason Given   [] Sick/ill   [x] Other: Patient completing ADL's with [facility], reports no increased difficulty, no skilled OT followup at this time.         Carlos Joy OT Date: 2022

## 2022-11-11 NOTE — PROGRESS NOTES
[facility]  Inpatient/Observation/Outpatient Rehabilitation    Date: 2022  Patient Name: Faith Medina       [x] Inpatient Acute/Observation       []  Outpatient  : 1965       [] Pt no showed for scheduled appointment    [] Pt refused/declined therapy at this time due to:           [x] Pt cancelled due to:  [] No Reason Given   [] Sick/ill   [] Other: per patient she is completely independent with all ambulation and has been getting up in her room, she does not need any further Pt evaluation at this time    Therapist/Assistant will attempt to see this patient, at our earliest opportunity.        Vitaliy Pringle, PT Date: 2022

## 2022-11-11 NOTE — H&P
History and Physical    Patient:  Phoenix Cueto  MRN: [id number]    Chief Complaint: Cindy Nieto for months, recurrent UTI\"    History Obtained From:  patient, electronic medical record    PCP: Radha Lucas MD    History of Present Illness: The patient is a 61 y.o. female who presented with complaints with myalgias, fever and recurrent UTI. 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 She stated that her body aches have been going on for months. She describes her myalgias as generalized and is associated with weakness and fatigue. She denied any known history of thyroid issues are autoimmune disorders such as Lupus. She underwent cystoscopy with attempted stent placement on 2022 without success due to duplicated system. Per urology the distal curl of the stent was in the urethra due to her anatomy. They stated that the patient needed a partial nephrectomy and currently has an appointment on Monday in [place] for urology. Patient reported that she had been on IV antibiotics for 2 weeks with a PICC line. T-max fever of 100.8. Patient was started back on meropenem in the ER due to previous urine cultures that showed sensitivity to beta-lactamase.     Past Medical History:        Diagnosis Date    Bell's palsy 2013    Carpal tunnel syndrome     Chicken pox     Hydronephrosis 2009    left     Hyperlipidemia 2013    Myalgia 2022    Pneumonia     Renal atrophy     SVT (supraventricular tachycardia) ([address] 2022    UTI (urinary tract infection)        Past Surgical History:        Procedure Laterality Date     SECTION      COLONOSCOPY N/A 2021    COLONOSCOPY performed by Lysle Meigs, MD at [address] [facility]      CYSTOSCOPY Left 2022    CYSTOSCOPY performed by Brain Infante MD at [address] [facility] Left 2022    CYSTOSCOPY URETERAL STENT INSERTION performed by Brain Infante MD at [address] [facility], [facility] [address] [facility] ([address] [facility])   ROTATOR CUFF REPAIR Left 11/2017    URETER SURGERY Left 11/8/2022    URETEROSCOPY performed by Rosy Medina MD at [address]       Medications Prior to Admission:    Prior to Admission medications    Medication Sig Start Date End Date Taking? Authorizing Provider   fluticasone (FLONASE) 50 MCG/ACT nasal spray USE 2 SPRAYS IN EACH NOSTRIL EVERYDAY  Patient not taking: Reported on 11/10/2022 10/10/22   Davis Chicas MD   spironolactone (ALDACTONE) 25 MG tablet TAKE 1 TABLET BY MOUTH EVERY DAY 10/6/22   Joshua Fisher MD   estradiol (ESTRACE VAGINAL) 0.1 MG/GM vaginal cream Place a pea-sized amount vaginally daily x 2 weeks, then 3 times per week thereafter. Use finger to apply, wash hands after application. DO NOT use plastic applicator. Call office if you stop this medication. 9/22/22   ERNESTO Curry - CARLOS   dilTIAZem (CARDIZEM CD) 120 MG extended release capsule Take 1 capsule by mouth in the morning. Patient not taking: Reported on 11/10/2022 8/2/22   Joshua Fisher MD   CRANBERRY CONCENTRATE PO Take by mouth    Historical Provider, MD       Allergies:  Sulfa antibiotics    Social History:   TOBACCO:   reports that she has never smoked. She has never used smokeless tobacco.  ETOH:   reports no history of alcohol use. Family History:       Problem Relation Age of Onset    Cancer Mother         breast    Heart Disease Mother     Stroke Mother     Cancer Father        Allergies:  Sulfa antibiotics    Medications Prior to Admission:    Prior to Admission medications    Medication Sig Start Date End Date Taking?  Authorizing Provider   fluticasone (FLONASE) 50 MCG/ACT nasal spray USE 2 SPRAYS IN EACH NOSTRIL EVERYDAY  Patient not taking: Reported on 11/10/2022 10/10/22   Davis Chicas MD   spironolactone (ALDACTONE) 25 MG tablet TAKE 1 TABLET BY MOUTH EVERY DAY 10/6/22   Joshua Fisher MD   estradiol (ESTRACE VAGINAL) 0.1 MG/GM vaginal cream Place a pea-sized amount vaginally daily x 2 weeks, then 3 times per week thereafter. Use finger to apply, wash hands after application. DO NOT use plastic applicator. Call office if you stop this medication. 9/22/22   ERNESTO Rodriguez CNP   dilTIAZem (CARDIZEM CD) 120 MG extended release capsule Take 1 capsule by mouth in the morning. Patient not taking: Reported on 11/10/2022 8/2/22   Viral Hanna MD   CRANBERRY CONCENTRATE PO Take by mouth    Historical Provider, MD       Review of Systems:  Constitutional:positive  for fevers, and positive for chills. Eyes: negative for visual disturbance   ENT: negative for sore throat, negative nasal congestion, and negative for earache  Respiratory: negative for shortness of breath, negative for cough, and negative for wheezing  Cardiovascular: negative for chest pain, negative for palpitations, and negative for syncope  Gastrointestinal: negative for abdominal pain, negative for nausea,negative for vomiting, negative for diarrhea, negative for constipation, and negative for hematochezia or melena  Genitourinary: positive for dysuria, negative for urinary urgency, negative for urinary frequency, and negative for hematuria  Skin: negative for skin rash, and negative for skin lesions  Neurological: negative for unilateral weakness, numbness or tingling. Physical Exam:    Vitals:   Temp: 97.1 °F (36.2 °C)  BP: (!) 142/89  Resp: 18  Heart Rate: 74  SpO2: 97 %  24HR INTAKE/OUTPUT:    Intake/Output Summary (Last 24 hours) at 11/11/2022 1036  Last data filed at 11/11/2022 0537  Gross per 24 hour   Intake 1202.75 ml   Output --   Net 1202.75 ml       Weight    Body mass index is 46.2 kg/m². Exam:  GEN:    Awake, alert and oriented x3. EYES:  EOMI, pupils equal   NECK: Supple. No lymphadenopathy. No carotid bruit  CVS:    regular rate and rhythm, no audible murmur  PULM:  CTA, no wheezes, rales or rhonchi, no acute respiratory distress  ABD:    Bowels sounds normal.  Abdomen is soft. No distention.   no tenderness to palpation. EXT:   no edema bilaterally . No calf tenderness. NEURO: Moves all extremities. Motor and sensory are grossly intact  SKIN:  No rashes. No skin lesions.    -----------------------------------------------------------------  Diagnostic Data:     DATA:    CBC:   Lab Results   Component Value Date    WBC 8.7 11/11/2022    RBC 4.22 11/11/2022    HGB 12.7 11/11/2022    HCT 38.5 11/11/2022    MCV 91.2 11/11/2022     11/11/2022        CMP:   Lab Results   Component Value Date    GLUCOSE 111 (H) 11/11/2022    BUN 8 11/11/2022    CREATININE 0.47 (L) 11/11/2022     11/11/2022    K 3.9 11/11/2022    CALCIUM 9.0 11/11/2022     (H) 11/11/2022    CO2 22 11/11/2022    PROT 7.8 09/26/2022    LABALBU 4.6 09/26/2022    BILITOT 0.4 09/26/2022    ALKPHOS 96 09/26/2022    ALT 32 09/26/2022    AST 20 09/26/2022       UA:   Lab Results   Component Value Date    COLORU Yellow 11/10/2022    CLARITYU hazy 10/29/2021    SPECGRAV 1.020 11/10/2022    WBCUA 50  11/10/2022    RBCUA 2 TO 5 11/10/2022    EPITHUA 2 TO 5 11/10/2022    LEUKOCYTESUR LARGE (A) 11/10/2022    GLUCOSEU NEGATIVE 11/10/2022    BLOODU moderate 10/29/2021    KETUA NEGATIVE 11/10/2022    PROTEINU NEGATIVE 11/10/2022    HGBUR 1+ (A) 11/10/2022    CASTUA NOT REPORTED 11/16/2021    CRYSTUA NOT REPORTED 11/16/2021    BACTERIA 3+ (A) 11/10/2022    YEAST NOT REPORTED 11/16/2021       Lactic Acid:   Lab Results   Component Value Date    LACTA 0.9 11/10/2022       D-Dimer:  No results found for: DDIMER    PT/INR:  Lab Results   Component Value Date/Time    PROTIME 13.7 11/11/2022 08:40 AM    INR 1.1 11/11/2022 08:40 AM       High Sensitivity Troponin:  No results for input(s): TROPHS in the last 72 hours.     ABGs:   No results found for: PHART, PH, KNU0LYS, PCO2, PO2ART, PO2, NJY8HWQ, HCO3, BEART, BE, THGBART, THB, SIG8FSW, U9KHIJTZ, O2SAT, FIO2        CT ABDOMEN PELVIS W IV CONTRAST Additional Contrast? 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 There is new fat stranding seen adjacent to the left as well as the ureter of   the upper moiety, suggesting urinary tract infection/inflammation. There is redemonstration of cystic structure in the lower pelvis, possibly a   ureterocele versus nabothian cyst.         XR CHEST PORTABLE   Final Result   Unremarkable portable chest radiograph. EKG not ordered    Assessment:    Principal Problem:    Urinary tract infection  Active Problems:    Myalgia    SVT (supraventricular tachycardia) ([facility])  Resolved Problems:    * No resolved hospital problems. *      Patient Active Problem List    Diagnosis Date Noted    Myalgia 11/11/2022    SVT ([facility]raventricular tachycardia) ([facility] 11/11/2022    Urinary tract infection 11/10/2022    Urinary tract infection due to ESBL Klebsiella 09/26/2022    ITZ (obstructive sleep apnea) 07/29/2021    Ureterocele 08/06/2018    Bell's palsy 06/07/2013       Plan: This patient requires inpatient admission because of Recurrent UTI  Factors affecting the medical complexity of this patient include Myalgias  Estimated length of stay is 3 days  Recurrent UTI  Appreciate Urology  IVF  [employer]  Has Appointment at [address] [place] [address] with Urology on Monday due to anatomy and recurrent UTI. Ureterocele.   Cysto with stent by Dr Sofía Pat unsuccessful  Myalgias  Chronic and is associate with weakness, fatigue, activity intolerance  TSH with Reflux  ASUNCION, ESR, CRP, IgG, IgM, Lupus Anticoagulant, Serum Electrophoresis  SVT  Continue Cardizem CD  DVT prophylaxis: SCD  Peptic ulcer prophylaxis: Pepcid  High risk medications: none  Social Service and Case Management consults for DC planning  Dietician consult initiated    CORE MEASURES  DVT prophylaxis: SCD  Decubitus ulcer present on admission: No  CODE STATUS: FULL CODE  Nutrition Status: good Physical therapy: No   Old Charts reviewed: Yes  EKG Reviewed:  Yes  Advance Directive Addressed: Yes    ERNESTO Rain - CNP, ERNESTO, NP-C  11/11/2022, 10:36 AM

## 2022-11-12 LAB
ABSOLUTE EOS #: 0.25 K/UL (ref 0–0.44)
ABSOLUTE IMMATURE GRANULOCYTE: 0.04 K/UL (ref 0–0.3)
ABSOLUTE LYMPH #: 1.94 K/UL (ref 1.1–3.7)
ABSOLUTE MONO #: 0.83 K/UL (ref 0.1–1.2)
ANION GAP SERPL CALCULATED.3IONS-SCNC: 9 MMOL/L (ref 9–17)
BASOPHILS # BLD: 1 % (ref 0–2)
BASOPHILS ABSOLUTE: 0.05 K/UL (ref 0–0.2)
BUN BLDV-MCNC: 9 MG/DL (ref 6–20)
BUN/CREAT BLD: 18 (ref 9–20)
CALCIUM SERPL-MCNC: 9.7 MG/DL (ref 8.6–10.4)
CHLORIDE BLD-SCNC: 104 MMOL/L (ref 98–107)
CO2: 27 MMOL/L (ref 20–31)
CREAT SERPL-MCNC: 0.49 MG/DL (ref 0.5–0.9)
CULTURE: ABNORMAL
EOSINOPHILS RELATIVE PERCENT: 3 % (ref 1–4)
GFR SERPL CREATININE-BSD FRML MDRD: >60 ML/MIN/1.73M2
GLUCOSE BLD-MCNC: 106 MG/DL (ref 70–99)
HCT VFR BLD CALC: 41.5 % (ref 36.3–47.1)
HEMOGLOBIN: 13.5 G/DL (ref 11.9–15.1)
IMMATURE GRANULOCYTES: 1 %
LYMPHOCYTES # BLD: 24 % (ref 24–43)
MCH RBC QN AUTO: 29.5 PG (ref 25.2–33.5)
MCHC RBC AUTO-ENTMCNC: 32.5 G/DL (ref 28.4–34.8)
MCV RBC AUTO: 90.6 FL (ref 82.6–102.9)
MONOCYTES # BLD: 10 % (ref 3–12)
NRBC AUTOMATED: 0 PER 100 WBC
PDW BLD-RTO: 12.7 % (ref 11.8–14.4)
PLATELET # BLD: 323 K/UL (ref 138–453)
PMV BLD AUTO: 10.6 FL (ref 8.1–13.5)
POTASSIUM SERPL-SCNC: 4.1 MMOL/L (ref 3.7–5.3)
RBC # BLD: 4.58 M/UL (ref 3.95–5.11)
SEG NEUTROPHILS: 61 % (ref 36–65)
SEGMENTED NEUTROPHILS ABSOLUTE COUNT: 5.11 K/UL (ref 1.5–8.1)
SODIUM BLD-SCNC: 140 MMOL/L (ref 135–144)
SPECIMEN DESCRIPTION: ABNORMAL
WBC # BLD: 8.2 K/UL (ref 3.5–11.3)

## 2022-11-12 PROCEDURE — 2580000003 HC RX 258: Performed by: STUDENT IN AN ORGANIZED HEALTH CARE EDUCATION/TRAINING PROGRAM

## 2022-11-12 PROCEDURE — 6360000002 HC RX W HCPCS: Performed by: EMERGENCY MEDICINE

## 2022-11-12 PROCEDURE — 85025 COMPLETE CBC W/AUTO DIFF WBC: CPT

## 2022-11-12 PROCEDURE — 2580000003 HC RX 258: Performed by: EMERGENCY MEDICINE

## 2022-11-12 PROCEDURE — 6370000000 HC RX 637 (ALT 250 FOR IP): Performed by: STUDENT IN AN ORGANIZED HEALTH CARE EDUCATION/TRAINING PROGRAM

## 2022-11-12 PROCEDURE — 36415 COLL VENOUS BLD VENIPUNCTURE: CPT

## 2022-11-12 PROCEDURE — 1200000000 HC SEMI PRIVATE

## 2022-11-12 PROCEDURE — 80048 BASIC METABOLIC PNL TOTAL CA: CPT

## 2022-11-12 PROCEDURE — 84145 PROCALCITONIN (PCT): CPT

## 2022-11-12 PROCEDURE — 94761 N-INVAS EAR/PLS OXIMETRY MLT: CPT

## 2022-11-12 RX ADMIN — DILTIAZEM HYDROCHLORIDE 120 MG: 120 CAPSULE, COATED, EXTENDED RELEASE ORAL at 08:52

## 2022-11-12 RX ADMIN — MEROPENEM 1000 MG: 1 INJECTION, POWDER, FOR SOLUTION INTRAVENOUS at 03:18

## 2022-11-12 RX ADMIN — ACETAMINOPHEN 650 MG: 325 TABLET ORAL at 00:19

## 2022-11-12 RX ADMIN — SODIUM CHLORIDE 1000 ML: 9 INJECTION, SOLUTION INTRAVENOUS at 11:47

## 2022-11-12 RX ADMIN — SPIRONOLACTONE 25 MG: 25 TABLET, FILM COATED ORAL at 08:52

## 2022-11-12 RX ADMIN — MEROPENEM 1000 MG: 1 INJECTION, POWDER, FOR SOLUTION INTRAVENOUS at 19:25

## 2022-11-12 RX ADMIN — MEROPENEM 1000 MG: 1 INJECTION, POWDER, FOR SOLUTION INTRAVENOUS at 11:48

## 2022-11-12 ASSESSMENT — PAIN - FUNCTIONAL ASSESSMENT
PAIN_FUNCTIONAL_ASSESSMENT: ACTIVITIES ARE NOT PREVENTED
PAIN_FUNCTIONAL_ASSESSMENT: ACTIVITIES ARE NOT PREVENTED

## 2022-11-12 ASSESSMENT — PAIN SCALES - GENERAL
PAINLEVEL_OUTOF10: 2
PAINLEVEL_OUTOF10: 3

## 2022-11-12 ASSESSMENT — PAIN DESCRIPTION - DESCRIPTORS
DESCRIPTORS: DISCOMFORT
DESCRIPTORS: DISCOMFORT

## 2022-11-12 ASSESSMENT — PAIN DESCRIPTION - PAIN TYPE
TYPE: ACUTE PAIN
TYPE: ACUTE PAIN

## 2022-11-12 ASSESSMENT — PAIN DESCRIPTION - ONSET
ONSET: ON-GOING
ONSET: ON-GOING

## 2022-11-12 ASSESSMENT — PAIN DESCRIPTION - ORIENTATION
ORIENTATION: ANTERIOR
ORIENTATION: ANTERIOR

## 2022-11-12 ASSESSMENT — PAIN DESCRIPTION - LOCATION
LOCATION: HEAD
LOCATION: HEAD

## 2022-11-12 ASSESSMENT — PAIN DESCRIPTION - FREQUENCY
FREQUENCY: CONTINUOUS
FREQUENCY: CONTINUOUS

## 2022-11-12 NOTE — PROGRESS NOTES
Pt just returned to room from ambulating in hallway. Heriberto Li started. VS obtained and assessment done as charted in flow sheet. Pt is A&Ox4. Pt denies any pain. Writer noted no hat in bathroom and placed back in and patient aware that I&O would be recorded. Pt has trace nonpitting edema to lower legs, pillow placed under at this time. Pt denies any other needs at this time. Call light and bedside table are within reach, will monitor patient.

## 2022-11-12 NOTE — PROGRESS NOTES
Pedro Benítez M.D. Internal Medicine Progress Note    Patient: Danny Dale  Date of Admission: 11/10/2022  5:50 PM  Hospital Day # 2  Date of Evaluation: 11/12/2022      SUBJECTIVE:    Patient seen for f/u of Urinary tract infection. She still has some myalgias in her legs, especially her right leg. She states she did not improve after Statin was DC'd but did improve somewhat a week ago when diltiazem was DC'd. She has been back on Diltiazem here and her right leg especially is starting to hurt again. She is tolerating diet without n/v/d. She denies any fever or chills. Dysuria has improved. ROS:   Constitutional: negative  for fevers, and negative for chills. Respiratory: negative for shortness of breath, negative for cough, and negative for wheezing  Cardiovascular: negative for chest pain, and negative for palpitations  Gastrointestinal: negative for abdominal pain, negative for nausea,negative for vomiting, negative for diarrhea, and negative for constipation     All other systems were reviewed with the patient and are negative unless otherwise stated in HPI    -----------------------------------------------------------------  OBJECTIVE:  Vitals:   Temp: 97.1 °F (36.2 °C)  BP: (!) 142/76  Resp: 16  Heart Rate: 65  SpO2: 98 % on room air    Weight  Wt Readings from Last 3 Encounters:   11/12/22 (!) 320 lb 9.6 oz (145.4 kg)   11/08/22 (!) 311 lb 6.4 oz (141.3 kg)   10/31/22 (!) 310 lb (140.6 kg)     Body mass index is 46 kg/m². 24HR INTAKE/OUTPUT:      Intake/Output Summary (Last 24 hours) at 11/12/2022 1352  Last data filed at 11/12/2022 1321  Gross per 24 hour   Intake 2215.84 ml   Output 1300 ml   Net 915.84 ml       Exam:  GEN:    Awake, alert and oriented x3. EYES:  EOMI, pupils equal   NECK: Supple. No lymphadenopathy.   No carotid bruit  CVS:    regular rate and rhythm, no audible murmur  PULM:  CTA, no wheezes, rales or rhonchi, no acute respiratory distress  ABD:    Bowels sounds normal.  Abdomen is soft. No distention. no tenderness to palpation. EXT:   Trace edema bilaterally . No calf tenderness. NEURO: Moves all extremities. Motor and sensory are grossly intact  SKIN:  No rashes.   No skin lesions.    -----------------------------------------------------------------  DATA:  Complete Blood Count:   Recent Labs     11/10/22  1839 11/11/22  0840 11/12/22  0620   WBC 10.9 8.7 8.2   RBC 4.49 4.22 4.58   HGB 13.4 12.7 13.5   HCT 40.7 38.5 41.5   MCV 90.6 91.2 90.6   MCH 29.8 30.1 29.5   MCHC 32.9 33.0 32.5   RDW 12.9 13.0 12.7    275 323   MPV 10.5 10.7 10.6        Last 3 Blood Glucose:   Recent Labs     11/10/22  1839 11/11/22  0840 11/12/22  0620   GLUCOSE 100* 111* 106*        Comprehensive Metabolic Profile:   Recent Labs     11/10/22  1839 11/11/22  0840 11/12/22  0620    141 140   K 3.8 3.9 4.1    108* 104   CO2 24 22 27   BUN 9 8 9   CREATININE 0.63 0.47* 0.49*   GLUCOSE 100* 111* 106*   CALCIUM 9.2 9.0 9.7   PROT  --  5.7*  --         Urinalysis:   Lab Results   Component Value Date/Time    NITRU POSITIVE 11/10/2022 06:24 PM    COLORU Yellow 11/10/2022 06:24 PM    PHUR 6.0 11/10/2022 06:24 PM    WBCUA 50  11/10/2022 06:24 PM    RBCUA 2 TO 5 11/10/2022 06:24 PM    BACTERIA 3+ 11/10/2022 06:24 PM    SPECGRAV 1.020 11/10/2022 06:24 PM    LEUKOCYTESUR LARGE 11/10/2022 06:24 PM    UROBILINOGEN Normal 11/10/2022 06:24 PM    BILIRUBINUR NEGATIVE 11/10/2022 06:24 PM    GLUCOSEU NEGATIVE 11/10/2022 06:24 PM    KETUA NEGATIVE 11/10/2022 06:24 PM       Lactic Acid:   Lab Results   Component Value Date/Time    LACTA 0.9 11/10/2022 06:39 PM         Latest Reference Range & Units 11/11/22 08:40   CRP 0.0 - 5.0 mg/L 70.6 (H)        Latest Reference Range & Units 11/11/22 08:40   Sed Rate 0 - 30 mm/Hr 23         Radiology/Imaging:  CT ABDOMEN PELVIS W IV CONTRAST Additional Contrast? None   Final Result   Redemonstration of bilateral left renal collecting system duplication, with   redemonstration of hydronephrosis and hydroureter of the upper pole moiety. There is new fat stranding seen adjacent to the left as well as the ureter of   the upper moiety, suggesting urinary tract infection/inflammation. There is redemonstration of cystic structure in the lower pelvis, possibly a   ureterocele versus nabothian cyst.         XR CHEST PORTABLE   Final Result   Unremarkable portable chest radiograph.                  ASSESSMENT / PLAN:  Urinary tract infection  Continue  Meropenem    Urine culture: GNRs  SVT  Continue Diltiazem  Myalgias  ASUNCION, rheumatoid factor negative  ESR 23 but CRP elevated at 70.6  Possible med side effect as she states she felt better after Diltiazem was DC'd and now has pain again after it was restarted in the hospital  Will Hold Diltiazem for now  Nutrition status:   morbid obesity  Dietician consult initiated  Hospital Prophylaxis:   DVT: Lovenox   Stress Ulcer: H2 Blocker   High risk medications: none   Disposition:    Discharge plan is home    Unknown MD Marvin , M.D.  11/12/2022  1:52 PM

## 2022-11-12 NOTE — PROGRESS NOTES
VS rechecked and pt reassessed at this time. Pt remains A&Ox4. Pt complains of headache, prn Tylenol administered. Pt up to bathroom and back to bed while writer was in room, output recorded in flow sheet. Pt denies any other needs at this time and has call light within reach.

## 2022-11-13 VITALS
WEIGHT: 293 LBS | BODY MASS INDEX: 41.95 KG/M2 | HEIGHT: 70 IN | OXYGEN SATURATION: 99 % | RESPIRATION RATE: 18 BRPM | TEMPERATURE: 98.1 F | HEART RATE: 72 BPM | DIASTOLIC BLOOD PRESSURE: 85 MMHG | SYSTOLIC BLOOD PRESSURE: 144 MMHG

## 2022-11-13 LAB
ABSOLUTE EOS #: 0.19 K/UL (ref 0–0.44)
ABSOLUTE IMMATURE GRANULOCYTE: 0.04 K/UL (ref 0–0.3)
ABSOLUTE LYMPH #: 1.81 K/UL (ref 1.1–3.7)
ABSOLUTE MONO #: 0.54 K/UL (ref 0.1–1.2)
ANION GAP SERPL CALCULATED.3IONS-SCNC: 10 MMOL/L (ref 9–17)
BASOPHILS # BLD: 1 % (ref 0–2)
BASOPHILS ABSOLUTE: 0.05 K/UL (ref 0–0.2)
BUN BLDV-MCNC: 8 MG/DL (ref 6–20)
BUN/CREAT BLD: 18 (ref 9–20)
CALCIUM SERPL-MCNC: 9.3 MG/DL (ref 8.6–10.4)
CHLORIDE BLD-SCNC: 104 MMOL/L (ref 98–107)
CO2: 25 MMOL/L (ref 20–31)
CREAT SERPL-MCNC: 0.45 MG/DL (ref 0.5–0.9)
EOSINOPHILS RELATIVE PERCENT: 3 % (ref 1–4)
GFR SERPL CREATININE-BSD FRML MDRD: >60 ML/MIN/1.73M2
GLUCOSE BLD-MCNC: 108 MG/DL (ref 70–99)
HCT VFR BLD CALC: 40.1 % (ref 36.3–47.1)
HEMOGLOBIN: 13 G/DL (ref 11.9–15.1)
IMMATURE GRANULOCYTES: 1 %
LYMPHOCYTES # BLD: 24 % (ref 24–43)
MCH RBC QN AUTO: 29.3 PG (ref 25.2–33.5)
MCHC RBC AUTO-ENTMCNC: 32.4 G/DL (ref 28.4–34.8)
MCV RBC AUTO: 90.5 FL (ref 82.6–102.9)
MONOCYTES # BLD: 7 % (ref 3–12)
NRBC AUTOMATED: 0 PER 100 WBC
PDW BLD-RTO: 12.8 % (ref 11.8–14.4)
PLATELET # BLD: 317 K/UL (ref 138–453)
PMV BLD AUTO: 10.5 FL (ref 8.1–13.5)
POTASSIUM SERPL-SCNC: 4.2 MMOL/L (ref 3.7–5.3)
PROCALCITONIN: 0.18 NG/ML
RBC # BLD: 4.43 M/UL (ref 3.95–5.11)
SEG NEUTROPHILS: 64 % (ref 36–65)
SEGMENTED NEUTROPHILS ABSOLUTE COUNT: 4.82 K/UL (ref 1.5–8.1)
SODIUM BLD-SCNC: 139 MMOL/L (ref 135–144)
WBC # BLD: 7.5 K/UL (ref 3.5–11.3)

## 2022-11-13 PROCEDURE — 80048 BASIC METABOLIC PNL TOTAL CA: CPT

## 2022-11-13 PROCEDURE — 2580000003 HC RX 258: Performed by: EMERGENCY MEDICINE

## 2022-11-13 PROCEDURE — 2580000003 HC RX 258: Performed by: STUDENT IN AN ORGANIZED HEALTH CARE EDUCATION/TRAINING PROGRAM

## 2022-11-13 PROCEDURE — 6370000000 HC RX 637 (ALT 250 FOR IP): Performed by: INTERNAL MEDICINE

## 2022-11-13 PROCEDURE — 36415 COLL VENOUS BLD VENIPUNCTURE: CPT

## 2022-11-13 PROCEDURE — 6360000002 HC RX W HCPCS: Performed by: EMERGENCY MEDICINE

## 2022-11-13 PROCEDURE — 94761 N-INVAS EAR/PLS OXIMETRY MLT: CPT

## 2022-11-13 PROCEDURE — 6370000000 HC RX 637 (ALT 250 FOR IP): Performed by: STUDENT IN AN ORGANIZED HEALTH CARE EDUCATION/TRAINING PROGRAM

## 2022-11-13 PROCEDURE — 85025 COMPLETE CBC W/AUTO DIFF WBC: CPT

## 2022-11-13 RX ORDER — DIPHENHYDRAMINE HCL 25 MG
25 CAPSULE ORAL EVERY 6 HOURS PRN
COMMUNITY
Start: 2022-11-13 | End: 2022-11-21

## 2022-11-13 RX ORDER — DIPHENHYDRAMINE HCL 25 MG
25 CAPSULE ORAL EVERY 6 HOURS PRN
Status: DISCONTINUED | OUTPATIENT
Start: 2022-11-13 | End: 2022-11-13 | Stop reason: HOSPADM

## 2022-11-13 RX ORDER — SULFAMETHOXAZOLE AND TRIMETHOPRIM 800; 160 MG/1; MG/1
1 TABLET ORAL 2 TIMES DAILY
Qty: 8 TABLET | Refills: 0 | Status: SHIPPED | OUTPATIENT
Start: 2022-11-13 | End: 2022-11-17

## 2022-11-13 RX ORDER — SULFAMETHOXAZOLE AND TRIMETHOPRIM 800; 160 MG/1; MG/1
1 TABLET ORAL ONCE
Status: COMPLETED | OUTPATIENT
Start: 2022-11-13 | End: 2022-11-13

## 2022-11-13 RX ADMIN — SPIRONOLACTONE 25 MG: 25 TABLET, FILM COATED ORAL at 08:42

## 2022-11-13 RX ADMIN — SULFAMETHOXAZOLE AND TRIMETHOPRIM 1 TABLET: 800; 160 TABLET ORAL at 13:31

## 2022-11-13 RX ADMIN — MEROPENEM 1000 MG: 1 INJECTION, POWDER, FOR SOLUTION INTRAVENOUS at 12:03

## 2022-11-13 RX ADMIN — MEROPENEM 1000 MG: 1 INJECTION, POWDER, FOR SOLUTION INTRAVENOUS at 03:02

## 2022-11-13 RX ADMIN — SODIUM CHLORIDE, PRESERVATIVE FREE 10 ML: 5 INJECTION INTRAVENOUS at 08:42

## 2022-11-13 NOTE — PROGRESS NOTES
Telemetry patches changed. Pt is in bed watching television and  is getting ready to leave. Pt denies any needs or complaints. Call light remains within reach.

## 2022-11-13 NOTE — PROGRESS NOTES
Pt vitals and assessment completed at this time, see flowsheet. Pt A&O x4, breathing normal. Pt denies any chest pain or sob. Pt denies any pain or any other needs at this time, call light within reach, will continue to monitor.

## 2022-11-13 NOTE — PROGRESS NOTES
ADDENDUM    Pt had Bactrim DS about an hour ago. She denies any ill effects whatsoever. She denies nausea, itching or rash.       Will DC home with Bactrim DS po BID x 4 more days    Esther Rivas MD

## 2022-11-13 NOTE — DISCHARGE SUMMARY
Tennille Paredes M.D. Internal Medicine Discharge Summary    Patient ID:  Shiela Perez  [id number]  1965    Admission date: 11/10/2022    Discharge date: 11/13/2022     Admitting Physician: Marquis Weaver MD     Primary Care Physician: Josie Chadwick MD     Primary Discharge Diagnoses:   Patient Active Problem List    Diagnosis Date Noted    Myalgia 11/11/2022    SVT (supraventricular tachycardia) ([address] 11/11/2022    Urinary tract infection 11/10/2022    Urinary tract infection due to ESBL Klebsiella 09/26/2022    ITZ (obstructive sleep apnea) 07/29/2021    Ureterocele 08/06/2018    Bell's palsy 06/07/2013       Additional Diagnoses:       Diagnosis Date    Bell's palsy 6/6/2013    Carpal tunnel syndrome     Chicken pox     Hydronephrosis 2009    left     Hyperlipidemia 12/5/2013    Myalgia 11/11/2022    Pneumonia     Renal atrophy     SVT (supraventricular tachycardia) ([address] 11/11/2022    UTI (urinary tract infection)         Hospital Course: The patient was admitted for complicated UTI. She has h/o recurrent UTI's and follows with Urology. She had a scheduled f/u with Urology at [facility] 11/14/22. She was started on Meropenem. Urine culture grew ESBL+ E coli which was sensitive to Bactrim. She has a history of allergy to sulfa as a child, but she states she doesn't remember having any reaction but was told as a child not to take it. Her sister who is a retired nurse and is 8years older, also cannot remember pt having had any reactions. We trialed her on PO bactrim during her admission under close monitoring, and she tolerated it well without any side effects or allergic reactions. Interestingly, she was complaining of severe myalgias as well. She was recently started on Atorvastatin and Diltiazem. She developed myalgias prior to admission and her PCP DC'd the Atorvastatin, without any improvement in her symptoms. Later she DC'd her Diltiazem and her myalgias improved.   Diltiazem was restarted during her admission and she developed recurrent myalgias. Autoimmune workup was negative with negative ASUNCION, ds-DNA, RF and ESR. CRP was elevated at 70, however. I DC'd her diltiazem yesterday and today her myalgias have resolved once more. Diltiazem will be DC'd due to side effects. Discharge Exam:  GEN:    Awake, alert and oriented x3. EYES:  EOMI, pupils equal   NECK: Supple. No lymphadenopathy. No carotid bruit  CVS:    regular rate and rhythm, no audible murmur  PULM:  CTA, no wheezes, rales or rhonchi, no acute respiratory distress  ABD:    Bowels sounds normal.  Abdomen is soft. No distention. no tenderness to palpation. EXT:   no edema bilaterally . No calf tenderness. NEURO: Moves all extremities. Motor and sensory are grossly intact  SKIN:  No rashes. No skin lesions.       Consultants:    none    Procedures:    none    Complications:   none    Significant Diagnostic Studies:   Discharge Labs:  Complete Blood Count:     11/11/22  0840 11/12/22  0620 11/13/22  0615   WBC 8.7 8.2 7.5   RBC 4.22 4.58 4.43   HGB 12.7 13.5 13.0   HCT 38.5 41.5 40.1   MCV 91.2 90.6 90.5   MCH 30.1 29.5 29.3   MCHC 33.0 32.5 32.4   RDW 13.0 12.7 12.8    323 317   MPV 10.7 10.6 10.5         Blood Glucose:     11/10/22  1839 11/11/22  0840 11/12/22  0620 11/13/22  0615   GLUCOSE 100* 111* 106* 108*         Comprehensive Metabolic Profile:     26/63/62  0840 11/12/22  0620 11/13/22  0615    140 139   K 3.9 4.1 4.2   * 104 104   CO2 22 27 25   BUN 8 9 8   CREATININE 0.47* 0.49* 0.45*   GLUCOSE 111* 106* 108*   CALCIUM 9.0 9.7 9.3   PROT 5.7*  --   --          Urinalysis:         Lab Results   Component Value Date/Time     NITRU POSITIVE 11/10/2022 06:24 PM     COLORU Yellow 11/10/2022 06:24 PM     PHUR 6.0 11/10/2022 06:24 PM     WBCUA 50  11/10/2022 06:24 PM     RBCUA 2 TO 5 11/10/2022 06:24 PM     BACTERIA 3+ 11/10/2022 06:24 PM     SPECGRAV 1.020 11/10/2022 06:24 PM Positive POSITIVE     gentamicin Sensitive <=1     meropenem Sensitive <=0.25     nitrofurantoin Resistant 256     tobramycin Sensitive <=1     trimethoprim-sulfamethoxazole Sensitive <=20     piperacillin-tazobactam Resistant <=4                        Discharge Condition:   stable    Disposition:   Discharge to Home    Discharge Medications:     Medication List        START taking these medications      diphenhydrAMINE 25 MG capsule  Commonly known as: BENADRYL  Take 1 capsule by mouth every 6 hours as needed for Itching or Allergies     sulfamethoxazole-trimethoprim 800-160 MG per tablet  Commonly known as: Bactrim DS  Take 1 tablet by mouth 2 times daily for 4 days            CONTINUE taking these medications      CRANBERRY CONCENTRATE PO     estradiol 0.1 MG/GM vaginal cream  Commonly known as: ESTRACE VAGINAL  Place a pea-sized amount vaginally daily x 2 weeks, then 3 times per week thereafter. Use finger to apply, wash hands after application. DO NOT use plastic applicator. Call office if you stop this medication. spironolactone 25 MG tablet  Commonly known as: ALDACTONE  TAKE 1 TABLET BY MOUTH EVERY DAY            STOP taking these medications      dilTIAZem 120 MG extended release capsule  Commonly known as: CARDIZEM CD                  Where to Get Your Medications        These medications were sent to [address], [address] [place] [address]      Phone: [phone]   sulfamethoxazole-trimethoprim 800-160 MG per tablet       You can get these medications from any pharmacy    You don't need a prescription for these medications  diphenhydrAMINE 25 MG capsule         Patient Instructions:    Activity: activity as tolerated  Diet: encourage fluids  Wound Care: none needed  Follow up with Majo Kaur MD in 1-2 weeks     CORE MEASURES on Discharge (if applicable)  ACE/ARB in CHF: N/A  ASA in MI: N/A  Statin in MI: N/A  Statin in CVA: N/A  Antiplatelet in CVA: N/A    Total time spent on discharge services: 35 minutes  Including the following activities:  Evaluation and Management of patient  Discussion with patient and/or surrogate about current care plan  Coordination with Case Management and/or   Coordination of care with Consultants (if applicable)   Coordination of care with Receiving Facility Physician (if applicable)  Completion of DME forms (if applicable)  Preparation of Discharge Summary  Preparation of Medication Reconciliation  Preparation of Discharge Prescriptions      Signed:  Lonnie Mack MD, M.D.  11/13/2022  3:07 PM

## 2022-11-13 NOTE — PROGRESS NOTES
VS rechecked and pt reassessed at this time. Pt remains A&Ox4. Pt denies any pain. Hat emptied, output recorded. [address] started. Pt denies any other needs at this time. Call light remains within reach.

## 2022-11-13 NOTE — PROGRESS NOTES
Stan Mendiola M.D. Internal Medicine Progress Note    Patient: Avel Johnson  Date of Admission: 11/10/2022  5:50 PM  Hospital Day # 3  Date of Evaluation: 11/13/2022      SUBJECTIVE:    Patient seen for f/u of Urinary tract infection. She states her myalgias have resolved today since stopping her diltiazem yesterday. She denies fever or chills. She denies dysuria or hematuria. She states she was told she was allergic to sulfa as a child but doesn't remember having any reaction. Her older sister who is a nurse and is 8years older also does not remember pt having any particular reaction. No one else in the family has ever had a reaction to sulfa. I reviewed her urine culture results with her and the only PO med that the E coli is sensitive to is Bactrim. She is willing to try Bactrim here and we will watch her for an hour or two prior to DC to ensure she has no reaction. I also recommended she take Benadryl if she does subsequently develop any itching or rash. ROS:   Constitutional: negative  for fevers, and negative for chills. Respiratory: negative for shortness of breath, negative for cough, and negative for wheezing  Cardiovascular: negative for chest pain, and negative for palpitations  Gastrointestinal: negative for abdominal pain, negative for nausea,negative for vomiting, negative for diarrhea, and negative for constipation     All other systems were reviewed with the patient and are negative unless otherwise stated in HPI    -----------------------------------------------------------------  OBJECTIVE:  Vitals:   Temp: 98.1 °F (36.7 °C)  BP: (!) 144/85  Resp: 18  Heart Rate: 78  SpO2: 99 % on room air    Weight  Wt Readings from Last 3 Encounters:   11/13/22 (!) 319 lb 14.4 oz (145.1 kg)   11/08/22 (!) 311 lb 6.4 oz (141.3 kg)   10/31/22 (!) 310 lb (140.6 kg)     Body mass index is 45.9 kg/m².     24HR INTAKE/OUTPUT:      Intake/Output Summary (Last 24 hours) at 11/13/2022 1237  Last data filed at 11/13/2022 0845  Gross per 24 hour   Intake 1417.61 ml   Output 1200 ml   Net 217.61 ml       Exam:  GEN:    Awake, alert and oriented x3. EYES:  EOMI, pupils equal   NECK: Supple. No lymphadenopathy. No carotid bruit  CVS:    regular rate and rhythm, no audible murmur  PULM:  CTA, no wheezes, rales or rhonchi, no acute respiratory distress  ABD:    Bowels sounds normal.  Abdomen is soft. No distention. no tenderness to palpation. EXT:   Trace edema bilaterally . No calf tenderness. NEURO: Moves all extremities. Motor and sensory are grossly intact  SKIN:  No rashes.   No skin lesions.    -----------------------------------------------------------------  DATA:  Complete Blood Count:   Recent Labs     11/11/22  0840 11/12/22  0620 11/13/22  0615   WBC 8.7 8.2 7.5   RBC 4.22 4.58 4.43   HGB 12.7 13.5 13.0   HCT 38.5 41.5 40.1   MCV 91.2 90.6 90.5   MCH 30.1 29.5 29.3   MCHC 33.0 32.5 32.4   RDW 13.0 12.7 12.8    323 317   MPV 10.7 10.6 10.5        Last 3 Blood Glucose:   Recent Labs     11/10/22  1839 11/11/22  0840 11/12/22  0620 11/13/22  0615   GLUCOSE 100* 111* 106* 108*        Comprehensive Metabolic Profile:   Recent Labs     11/11/22  0840 11/12/22  0620 11/13/22  0615    140 139   K 3.9 4.1 4.2   * 104 104   CO2 22 27 25   BUN 8 9 8   CREATININE 0.47* 0.49* 0.45*   GLUCOSE 111* 106* 108*   CALCIUM 9.0 9.7 9.3   PROT 5.7*  --   --         Urinalysis:   Lab Results   Component Value Date/Time    NITRU POSITIVE 11/10/2022 06:24 PM    COLORU Yellow 11/10/2022 06:24 PM    PHUR 6.0 11/10/2022 06:24 PM    WBCUA 50  11/10/2022 06:24 PM    RBCUA 2 TO 5 11/10/2022 06:24 PM    BACTERIA 3+ 11/10/2022 06:24 PM    SPECGRAV 1.020 11/10/2022 06:24 PM    LEUKOCYTESUR LARGE 11/10/2022 06:24 PM    UROBILINOGEN Normal 11/10/2022 06:24 PM    BILIRUBINUR NEGATIVE 11/10/2022 06:24 PM    GLUCOSEU NEGATIVE 11/10/2022 06:24 PM    KETUA NEGATIVE 11/10/2022 06:24 PM       Lactic Acid: Lab Results   Component Value Date/Time    LACTA 0.9 11/10/2022 06:39 PM         Latest Reference Range & Units 10/31/22 08:10   ASUNCION NEGATIVE  NEGATIVE   Anti ds DNA <10.0 IU/mL 0.6      Latest Reference Range & Units 11/11/22 08:40   CRP 0.0 - 5.0 mg/L 70.6 (H)      Latest Reference Range & Units 11/11/22 08:40   Sed Rate 0 - 30 mm/Hr 23         Radiology/Imaging:  CT ABDOMEN PELVIS W IV CONTRAST Additional Contrast? None   Final Result   Redemonstration of bilateral left renal collecting system duplication, with   redemonstration of hydronephrosis and hydroureter of the upper pole moiety. There is new fat stranding seen adjacent to the left as well as the ureter of   the upper moiety, suggesting urinary tract infection/inflammation. There is redemonstration of cystic structure in the lower pelvis, possibly a   ureterocele versus nabothian cyst.         XR CHEST PORTABLE   Final Result   Unremarkable portable chest radiograph. Component 11/10/22 5620    Specimen Description . VOIDED URINE    Culture  Abnormal   ESCHERICHIA COLI >[id number] CFU/ML This organism is an Extended Spectrum Beta Lactamase (ESBL)  and resistance to therapy with Penicillins, Cephalosporins and Aztreonam is expected. These organisms generally remain susceptible to Carbapenems. Consider ID Consultation. CONTACT PRECAUTIONS INDICATED.      Adrien [address]        Susceptibility    Escherichia coli (1)    Antibiotic Interpretation Microscan    ampicillin Resistant >=32    aztreonam Resistant 4    ceFAZolin Resistant >=64    cefTRIAXone Resistant >=64    ciprofloxacin Resistant >=4    Confirmatory Extended Spectrum Beta-Lactamase Positive POSITIVE    gentamicin Sensitive <=1    meropenem Sensitive <=0.25    nitrofurantoin Resistant 256    tobramycin Sensitive <=1    trimethoprim-sulfamethoxazole Sensitive <=20    piperacillin-tazobactam Resistant <=4                   ASSESSMENT / PLAN:  Urinary tract infection  Will administer Bactrim DS x 1 here and monitor for adverse reaction for an hour or two  Urine culture: E coli (ESBL+)  SVT  Continue Diltiazem  Myalgias  ASUNCION, rheumatoid factor negative  ESR 23 but CRP elevated at 70.6  probable med side effect as she states she felt better after Diltiazem was DC'd   Will Hold Diltiazem  Nutrition status:   morbid obesity  Dietician consult initiated  Hospital Prophylaxis:   DVT: Lovenox   Stress Ulcer: H2 Blocker   High risk medications: none   Disposition:    Discharge plan is home today - she has an appt at [facility] tomorrow morning which she \"cannot miss\"    Dena Parkinson MD , M.D.  11/13/2022  12:37 PM

## 2022-11-13 NOTE — PROGRESS NOTES
Pt sitting up in bed visiting with  in room at this time. VS obtained and assessment done as charted in flow sheet. Pt remains A&Ox4. Pt denies any pain at this time. [facility] Vora started. Pt denies any needs at this time and has call light within reach, will continue to monitor.

## 2022-11-14 LAB
ALBUMIN (CALCULATED): 3.6 G/DL (ref 3.2–5.2)
ALBUMIN PERCENT: 63 % (ref 45–65)
ALPHA 1 PERCENT: 3 % (ref 3–6)
ALPHA 2 PERCENT: 13 % (ref 6–13)
ALPHA-1-GLOBULIN: 0.2 G/DL (ref 0.1–0.4)
ALPHA-2-GLOBULIN: 0.7 G/DL (ref 0.5–0.9)
ANTI DNA DOUBLE STRANDED: 0.7 IU/ML
ANTI-NUCLEAR ANTIBODY (ANA): NEGATIVE
BETA GLOBULIN: 0.7 G/DL (ref 0.5–1.1)
BETA PERCENT: 12 % (ref 11–19)
ENA ANTIBODIES SCREEN: 0.3 U/ML
GAMMA GLOBULIN %: 10 % (ref 9–20)
GAMMA GLOBULIN: 0.6 G/DL (ref 0.5–1.5)
PATHOLOGIST: ABNORMAL
PROTEIN ELECTROPHORESIS, SERUM: ABNORMAL
TOTAL PROT. SUM,%: 101 % (ref 98–102)
TOTAL PROT. SUM: 5.8 G/DL (ref 6.3–8.2)
TOTAL PROTEIN: 5.7 G/DL (ref 6.4–8.3)

## 2022-11-14 NOTE — PROGRESS NOTES
Physician Progress Note      Juan Alberto Hayes  [facility] #:                  [id number]  :                       1965  ADMIT DATE:       11/10/2022 5:50 PM  DISCH DATE:        2022 3:24 PM  RESPONDING  PROVIDER #:        Pool Lee MD          QUERY TEXT:    Pt admitted with UTI. Per H&P:  cystoscopy with? attempted?stent placement on 2022? without   success due to duplicated system    If possible, please document in progress notes and discharge summary the   relationship if any between the UTI  and the surgery: The medical record reflects the following:  Risk Factors: recurrent UTI, left renal collecting system duplication,   ureterocele versus Nabothian cyst  Clinical Indicators: UA 3+ bacteria, positive nitrite, large leukocyte   esterase, culture in process;    CT abdomen and pelvis:  Redemonstration of   bilateral left renal collecting system duplication, with redemonstration of   hydronephrosis and hydroureter of the upper pole moiety. ? There is new fat   stranding seen adjacent to the left as well as the ureter of the upper moiety,   suggesting urinary tract  infection/inflammation. ? There is redemonstration   of cystic structure in the lower pelvis, possibly a ureterocele versus   nabothian cyst.  Treatment:  IVF,  IV meropenem,  upcoming [facility] urology   consultation for partial nephrectomy    Patrizia Whitt MSN, RN, CCDS, [facility]  Clinical   [phone]  Options provided:  -- UTI is due to cystoscopy with? attempted?stent placement  -- UTI  is not due to the procedure, but is related to duplicated renal   collecting system and/or ureterocele  -- UTI  is not due to the procedure, but is due to other incidental risk   factor, Please specify other incidental risk factor.   -- Other - I will add my own diagnosis  -- Disagree - Not applicable / Not valid  -- Disagree - Clinically unable to determine / Unknown  -- Refer to Clinical Documentation Reviewer    PROVIDER RESPONSE TEXT:    UTI is not due to the procedure but due to Renal anatomy/hx of recurrent UTIs    Query created by: Candie Tay on 11/11/2022 3:07 PM      Electronically signed by:  Jose A Mccloud MD 11/14/2022 8:58 AM

## 2022-11-15 LAB
CULTURE: NORMAL
CULTURE: NORMAL
Lab: NORMAL
Lab: NORMAL
SPECIMEN DESCRIPTION: NORMAL
SPECIMEN DESCRIPTION: NORMAL

## 2022-11-17 LAB
ANTICARDIOLIPIN IGA ANTIBODY: 1.1 APL (ref 0–14)
ANTICARDIOLIPIN IGG ANTIBODY: 1.8 GPL (ref 0–10)
CARDIOLIPIN AB IGM: 1 MPL (ref 0–10)
DILUTE RUSSELL VIPER VENOM TIME: NORMAL
INR BLD: 1.1
PARTIAL THROMBOPLASTIN TIME: 27.2 SEC (ref 26.8–34.8)
PROTHROMBIN TIME: 13.7 SEC (ref 11.5–14.2)

## 2022-11-21 ENCOUNTER — OFFICE VISIT (OUTPATIENT)
Dept: CARDIOLOGY CLINIC | Age: 57
End: 2022-11-21
Payer: COMMERCIAL

## 2022-11-21 ENCOUNTER — OFFICE VISIT (OUTPATIENT)
Dept: UROLOGY | Age: 57
End: 2022-11-21
Payer: COMMERCIAL

## 2022-11-21 ENCOUNTER — HOSPITAL ENCOUNTER (OUTPATIENT)
Age: 57
Discharge: HOME | End: 2022-11-21
Payer: COMMERCIAL

## 2022-11-21 VITALS
BODY MASS INDEX: 41.95 KG/M2 | HEART RATE: 83 BPM | WEIGHT: 293 LBS | DIASTOLIC BLOOD PRESSURE: 18 MMHG | SYSTOLIC BLOOD PRESSURE: 154 MMHG | HEIGHT: 70 IN

## 2022-11-21 VITALS
BODY MASS INDEX: 44.77 KG/M2 | WEIGHT: 293 LBS | SYSTOLIC BLOOD PRESSURE: 160 MMHG | DIASTOLIC BLOOD PRESSURE: 80 MMHG | OXYGEN SATURATION: 98 % | HEART RATE: 102 BPM

## 2022-11-21 DIAGNOSIS — E78.5 HYPERLIPIDEMIA, UNSPECIFIED HYPERLIPIDEMIA TYPE: ICD-10-CM

## 2022-11-21 DIAGNOSIS — R60.0 LEG EDEMA, RIGHT: ICD-10-CM

## 2022-11-21 DIAGNOSIS — I47.1 SVT (SUPRAVENTRICULAR TACHYCARDIA) (HCC): Primary | ICD-10-CM

## 2022-11-21 DIAGNOSIS — N39.0 FREQUENT UTI: Primary | ICD-10-CM

## 2022-11-21 DIAGNOSIS — Q62.63 ECTOPIC URETER: ICD-10-CM

## 2022-11-21 LAB
CHOLESTEROL/HDL RATIO: 6.7
CHOLESTEROL: 220 MG/DL
HDLC SERPL-MCNC: 33 MG/DL
LDL CHOLESTEROL: 143 MG/DL (ref 0–130)
PATIENT FASTING?: YES
TRIGL SERPL-MCNC: 220 MG/DL

## 2022-11-21 PROCEDURE — 99214 OFFICE O/P EST MOD 30 MIN: CPT | Performed by: UROLOGY

## 2022-11-21 PROCEDURE — 36415 COLL VENOUS BLD VENIPUNCTURE: CPT

## 2022-11-21 PROCEDURE — 99214 OFFICE O/P EST MOD 30 MIN: CPT | Performed by: INTERNAL MEDICINE

## 2022-11-21 PROCEDURE — 80061 LIPID PANEL: CPT

## 2022-11-21 RX ORDER — EPLERENONE 25 MG/1
25 TABLET, FILM COATED ORAL DAILY
Qty: 30 TABLET | Refills: 11 | Status: SHIPPED | OUTPATIENT
Start: 2022-11-21 | End: 2022-11-22 | Stop reason: SDUPTHER

## 2022-11-21 ASSESSMENT — ENCOUNTER SYMPTOMS
NAUSEA: 0
APNEA: 0
COLOR CHANGE: 0
BACK PAIN: 0
CONSTIPATION: 0
VOMITING: 0
SHORTNESS OF BREATH: 0
WHEEZING: 0
ABDOMINAL PAIN: 0
COUGH: 0
EYE REDNESS: 0

## 2022-11-21 NOTE — LETTER
Sandra Recio M.D. [address] [place] [address]  [phone]          2022          Jerome Ramos MD  [address] [address]      RE:   Jaswinder Marvin  :  1965      Dear Dr. Farias Single:    CHIEF COMPLAINT:  1. SVT associated with dizziness and lightheadedness with rates up to 190, controlled with Cardizem  mg daily, however, did not tolerate Cardizem. 2.  Familial hyperlipidemia with an LDL in the 150 range. 3.  Continued discomfort in the right lower extremity. HISTORY OF PRESENT ILLNESS:  I had the pleasure of seeing Mrs. Jaswinder Marvin in the office on 2022. She has a long history of intermittent \"flutters. \"  She came to the emergency room on 2022 with SVT where she had a heart rate of 174. She had been on metoprolol appropriately for tachycardia. Because of her documented SVT on metoprolol, I stopped it and placed her on Cardizem  mg daily. We did an echocardiogram and stress test, which were normal.    She does have a history of sleep apnea and has been on a CPAP machine for the last year. She also has a history of severe hyperlipidemia. She was placed on Lipitor 20 mg daily and developed pain in her legs, this was stopped and she was placed on Crestor 5 mg daily, although when I saw her on , she had not started that. She also complained of pain in her right calf, which began in the first part of August.  We did an ultrasound, which was negative for DVT. I did an ultrasound of her right lower extremity, which was negative for DVT, and I placed her on Crestor 5 mg daily, Aldactone 25 mg daily and kept her on Cardizem  mg daily. Unfortunately, she has not done well. She had recurrent urinary tract infections due to Klebsiella and was on IV infusions. She was started on meropenem IV infusions daily. She began developing severe myalgias. Initially, Lipitor was stopped and I placed her on Crestor 5 mg daily along with diltiazem; however, her myalgias continued. She stopped her diltiazem and her Crestor and was off all medications and her myalgias improved. Diltiazem was restarted on 11/10, and myalgias worsened. This was stopped again on 2022. 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 She does have a chronic obstruction in her upper pole moiety and there is plan for a possible left upper nephrectomy if her UTIs continue. She has had no chest pain or chest discomfort. She has had no palpitations as of yet. She is planning to go to [facility] on , to see a urologist for possible left upper nephrectomy. When I see her today, she still is not feeling well. She still has pain in both legs, although they are better. Her right leg is markedly swollen as compared to her left leg. There is also tenderness in her calf as it was when I saw her in September. She is currently off all medications. She has never had a myocardial infarction or cardiac catheterization. CARDIAC RISK FACTORS:  Hypertension:  Negative. Peripheral Vascular Disease:  Negative. Hyperlipidemia:  Positive. Other Family Members:  Negative. Smoking:  Negative. Diabetes:  Negative. MEDICATIONS AT THIS TIME:  She is off all medications. PAST MEDICAL AND SURGICAL HISTORY:  1. Sleep apnea, on a CPAP mask. 2.  Bell's palsy on 2013. 3.  Carpal tunnel syndrome. 4.  Hydronephrosis in her left kidney in .  5.  Hyperlipidemia since . 6.  . 7.  Hysterectomy in . 8.  Rotator cuff repair in the left in 2017.  9. SVT at 200 beats per minute documented in the emergency room. FAMILY HISTORY:  Mother had valve replacement. Sister had atrial fibrillation.     SOCIAL HISTORY:  She is 61years old,  to [employer] [place]. 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 Stays active on the farm. REVIEW OF SYSTEMS:  Cardiac as above. Other systems reviewed including constitutional, eyes, ears, nose and throat, cardiovascular, respiratory, GI, , musculoskeletal, integumentary, neurologic, endocrine, hematologic and allergic/immunologic, are negative except for what is described above. No weight loss or weight gain. No change in bowels. No blood in stool. No fevers, sweats or chills. PHYSICAL EXAMINATION:  VITAL SIGNS:  Her blood pressure was 160/80 with a heart rate of 102 and regular. Respiratory rate 18. O2 sat 98%. Weight 312 pounds. GENERAL:  She is a very pleasant 71-year-old female. Denied pain. She was oriented to person, place and time. Answered questions appropriately. SKIN:  No unusual skin changes. HEENT:  The pupils are equally round and intact. Mucous membranes were dry. NECK:  No JVD. Good carotid pulses. No carotid bruits. No lymphadenopathy or thyromegaly. CARDIOVASCULAR EXAM:  S1 and S2 were normal.  No S3 or S4. Soft systolic blowing type murmur. No diastolic murmur. PMI was normal.  No lift, thrust, or pericardial friction rub. LUNGS:  Quite clear to auscultation and percussion. ABDOMEN:  Soft and nontender. Good bowel sounds. EXTREMITIES:  Good femoral pulses. Good pedal pulses. She had 3+ edema in the right lower extremity with tenderness in her right calf. She had 2+ in the left lower extremity with slight tenderness in the calf. NEUROLOGIC EXAM:  Unremarkable. PSYCHIATRIC EXAM:  Unremarkable. LABORATORY DATA:  On 11/13, sodium 139, potassium 4.2, BUN 8, creatinine 0.45. Her calcium was 9.3. Cholesterol 220 with an HDL of 33, , triglycerides 220. Her glucose was 108. White count 7.5, hemoglobin 13.0 with a platelet count of 986,601.     EKG has been normal.    Cardiolite stress test on 08/11/2022, was normal.    Echocardiogram on 08/08/2022, showed normal LV function, EF of 60% with normal right-sided chambers. CT scan of abdomen and pelvis showed bilateral left renal collecting system duplication with hydronephrosis and hydroureter of the upper pole of the left kidney with a cystic structure in the left pelvis. IMPRESSION:  1.  SVT, which was controlled with Cardizem  mg daily, however, stopped because of myalgias, which appeared to be secondary to Cardizem. 2.  Familial hyperlipidemia with an LDL to 150 range, with myalgias with Lipitor. 3.  Marked edema in both lower extremities, with the right greater than left, with a negative lower extremity ultrasound for DVT on 78/28/5007.  4.  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 5.  Normal Lexiscan stress test.  6.  Normal echocardiogram.  7.  Sleep apnea, on a CPAP mask. 8.  Recurrent UTIs, treated with meropenem infusions daily for two weeks. PLAN:  1. We will place her on Inspra 25 mg b.i.d. for her lower extremity edema, which I think may be secondary to her sleep apnea. 2.  We will follow up in one week to evaluate her edema while doing a BMP, magnesium and CK. 3.  If there is no change in her edema and pain in her right lower extremity, we will repeat her ultrasound to make sure there is no DVT. 4.  Expect reoccurrence of her SVT, which historically had heart rates in the 90s and therefore plan on treating with flecainide 50 mg b.i.d.  5.  We will place a 30-day event recorder when we do place her on flecainide to make sure she has no proarrhythmias and to monitor for SVT. DISCUSSION:  Kerry James is fairly complex. From a cardiac standpoint, she has recurrent SVTs up to 200 beats per minute, which is quite symptomatic.   These were controlled with Cardizem, but she developed what appears to be very rare side effect with marked myalgias, which responded to stopping Cardizem and reappeared when Cardizem was restarted. She had been on Lipitor and had some lower extremity discomfort, but it worsened with Cardizem. I had tried her on Crestor, but again she was taking Cardizem at that time. Her myalgias are improving although not completely gone off from Cardizem and Crestor. At this time, we will not complicate her issue by attempting to treat her hyperlipidemia. I will do a CK in one week when I repeat her BMP and magnesium to see if there is any evidence of myositis. Almost certainly her SVT will reoccur. Again, it is at 200 beats per minute and symptomatic and therefore, I would treat preemptively. We will let her recover from her myalgias and when they have subsided, which would be hopefully soon, we will place her on flecainide 50 mg b.i.d. We will have a 30-day event recorder to monitor for any arrhythmias. The possibility of proarrhythmias would be very unusual in view of normal stress test and normal echocardiogram.    Again, she does have much more edema in her right lower extremity, although she has moderate edema in the left lower extremity. I will treat with Inspra 25 mg b.i.d., which would be her only drug at this time. We will see if she responds to Inspra in one week. If there is no change, then we would do again an ultrasound of her right and left lower extremity. I will not complicate the picture by placing her on more than one medication at a time. Therefore, she is on Inspra and we will watch for worsening of her muscle pain. Later, if we have demonstrated that there are no side effects from Inspra, then I would add flecainide. I would like to have her treated for SVT before she has her left upper pole nephrectomy if possible, although I would not delay surgery based on the non-addition of flecainide. I will meet with her in one week to re-look at her edema.   We will also at that time do a basic metabolic profile along with magnesium and CK. Thank you very much for allowing me the privilege of seeing Mrs. Elisa Ruiz. If you have any questions on my thoughts, please do not hesitate to contact me.      Sincerely,        Maricruz Ballesteros    D: 11/23/2022 9:48:36     T: 11/23/2022 9:56:18     [id number]  Job#: [id number]   Doc#: [id number]

## 2022-11-21 NOTE — PATIENT INSTRUCTIONS
Will ADD Inspra 25 mg one tablet twice a day     Will follow up in one week with BMP/MG/CK in one week

## 2022-11-21 NOTE — PROGRESS NOTES
HPI:        Patient is a 61 y.o. female in no acute distress. She is alert and oriented to person, place, and time. History  2010 Hysterectomy and oophorectomy      Previous patient of Dr. Randy Ortiz for frequent UTI. On daily antibiotic     4/2015 referral for frequent UTI  Vaginal estrogen     LOST TO FOLLOW UP     7/2018 Referral from the ER for frequent UTI. She does complain of dysuria, frequency and abdominal pain when she develops a UTI. 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 There is a questionable ectopic ureterocele. The radiologist does feel that this could represent a large nabothian cyst or cystic/necrotic cervical malignancy. Patient did have a total abdominal hysterectomy in 2010 due to endometriosis with Dr. Dennis Marinelli. It is unclear if this is a radical hysterectomy with complete removal of the cervix. She denies constipation. 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 Cystoscopy showed fullness on the left side of the floor of the bladder,  no distinct cystocele seen. 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 Patient did have a UTI after surgery. We did treat this appropriately with culture specific antibiotics. She did subsequently go to the outside facility. It does sound the outside facility wanted to send her somewhere.   She does have another appointment with outside urology in approximately 3 weeks. This appointment is for a left heminephrectomy. Patient has no current gross hematuria dysuria. She has no flank pain nausea vomiting. No pain today. Past Medical History:   Diagnosis Date    Bell's palsy 2013    Carpal tunnel syndrome     Chicken pox     Hydronephrosis     left     Hyperlipidemia 2013    Myalgia 2022    Pneumonia     Renal atrophy     SVT (supraventricular tachycardia) ([address] 2022    UTI (urinary tract infection)      Past Surgical History:   Procedure Laterality Date     SECTION      COLONOSCOPY N/A 2021    COLONOSCOPY performed by Andrea Jernigan MD at [facility]  2009    CYSTOSCOPY Left 2022    CYSTOSCOPY performed by Yvette Medina MD at [address] Left 2022    CYSTOSCOPY URETERAL STENT INSERTION performed by Yvette Medina MD at [address], [address] (CERVIX REMOVED)      ROTATOR CUFF REPAIR Left 2017    URETER SURGERY Left 2022    URETEROSCOPY performed by Yvette Medina MD at [address] [facility] Encounter Medications as of 2022   Medication Sig Dispense Refill    diphenhydrAMINE (BENADRYL) 25 MG capsule Take 1 capsule by mouth every 6 hours as needed for Itching or Allergies (Patient not taking: Reported on 2022)      spironolactone (ALDACTONE) 25 MG tablet TAKE 1 TABLET BY MOUTH EVERY DAY (Patient not taking: Reported on 2022) 90 tablet 3    estradiol (ESTRACE VAGINAL) 0.1 MG/GM vaginal cream Place a pea-sized amount vaginally daily x 2 weeks, then 3 times per week thereafter. Use finger to apply, wash hands after application. DO NOT use plastic applicator. Call office if you stop this medication. (Patient not taking: Reported on 2022) 42.5 g 3    CRANBERRY CONCENTRATE PO Take by mouth (Patient not taking: Reported on 2022)       No facility-administered encounter medications on file as of 2022.       Current Outpatient Medications on File Prior to Visit   Medication Sig Dispense Refill    diphenhydrAMINE (BENADRYL) 25 MG capsule Take 1 capsule by mouth every 6 hours as needed for Itching or Allergies (Patient not taking: Reported on 11/21/2022)      spironolactone (ALDACTONE) 25 MG tablet TAKE 1 TABLET BY MOUTH EVERY DAY (Patient not taking: Reported on 11/21/2022) 90 tablet 3    estradiol (ESTRACE VAGINAL) 0.1 MG/GM vaginal cream Place a pea-sized amount vaginally daily x 2 weeks, then 3 times per week thereafter. Use finger to apply, wash hands after application. DO NOT use plastic applicator. Call office if you stop this medication. (Patient not taking: Reported on 11/21/2022) 42.5 g 3    CRANBERRY CONCENTRATE PO Take by mouth (Patient not taking: Reported on 11/21/2022)       No current facility-administered medications on file prior to visit. Sulfa antibiotics  Family History   Problem Relation Age of Onset    Cancer Mother         breast    Heart Disease Mother     Stroke Mother     Cancer Father      Social History     Tobacco Use   Smoking Status Never   Smokeless Tobacco Never       Social History     Substance and Sexual Activity   Alcohol Use No    Alcohol/week: 0.0 standard drinks       Review of Systems   Constitutional:  Negative for appetite change, chills and fever. Eyes:  Negative for redness and visual disturbance. Respiratory:  Negative for apnea, cough, shortness of breath and wheezing. Cardiovascular:  Negative for chest pain and leg swelling. Gastrointestinal:  Negative for abdominal pain, constipation, nausea and vomiting. Genitourinary:  Negative for difficulty urinating, dyspareunia, dysuria, enuresis, flank pain, frequency, hematuria, pelvic pain, urgency, vaginal bleeding and vaginal discharge. Musculoskeletal:  Negative for back pain, joint swelling and myalgias. Skin:  Negative for color change, rash and wound. Neurological:  Negative for dizziness, tremors and numbness. Hematological:  Negative for adenopathy. Does not bruise/bleed easily. Psychiatric/Behavioral:  Negative for sleep disturbance. BP (!) 154/18   Pulse 83   Ht 5' 10\" (1.778 m)   Wt (!) 318 lb (144.2 kg)   BMI 45.63 kg/m²       PHYSICAL EXAM:  Constitutional: Patient resting comfortably, in no acute distress. Neuro: Alert and oriented to person place and time. Cranial nerves grossly intact. Psych: Mood and affect normal.  Skin: Warm, dry  HEENT: normocephalic, atraumatic  Lymphatics: No palpable lymphadenopathy  Lungs: Respiratory effort normal, unlabored  Cardiovascular:  Normal peripheral pulses  Abdomen: Soft, non-tender, non-distended with no organomegaly or palpable masses. : No CVA tenderness. Bladder non-tender and not distended. Pelvic: deferred    Lab Results   Component Value Date    BUN 8 11/13/2022     Lab Results   Component Value Date    CREATININE 0.45 (L) 11/13/2022       ASSESSMENT:  This is a 61 y.o. female with the following diagnoses:   Diagnosis Orders   1. Frequent UTI        2. Ectopic ureter               PLAN:  Will assist any way we can. Patient should be referred for limited left heminephrectomy.

## 2022-11-22 ENCOUNTER — TELEPHONE (OUTPATIENT)
Dept: CARDIOLOGY CLINIC | Age: 57
End: 2022-11-22

## 2022-11-22 RX ORDER — EPLERENONE 25 MG/1
25 TABLET, FILM COATED ORAL 2 TIMES DAILY
Qty: 60 TABLET | Refills: 11 | Status: SHIPPED | OUTPATIENT
Start: 2022-11-22

## 2022-11-22 NOTE — TELEPHONE ENCOUNTER
Pt was questioning if she needed to take her inspra twice daily. Confirmed yes and called pt back and notified her.

## 2022-11-23 NOTE — PROGRESS NOTES
Maria L Ludnberg M.D. [address] [place] [address]  [phone]          2022          Tima Thomas MD  [address] [address]      RE:   Antonio Reyes  :  1965      Dear Dr. Kash Bal:    CHIEF COMPLAINT:  1. SVT associated with dizziness and lightheadedness with rates up to 190, controlled with Cardizem  mg daily, however, did not tolerate Cardizem. 2.  Familial hyperlipidemia with an LDL in the 150 range. 3.  Continued discomfort in the right lower extremity. HISTORY OF PRESENT ILLNESS:  I had the pleasure of seeing Mrs. Antonio Reyes in the office on 2022. She has a long history of intermittent \"flutters. \"  She came to the emergency room on 2022 with SVT where she had a heart rate of 174. She had been on metoprolol appropriately for tachycardia. Because of her documented SVT on metoprolol, I stopped it and placed her on Cardizem  mg daily. We did an echocardiogram and stress test, which were normal.    She does have a history of sleep apnea and has been on a CPAP machine for the last year. She also has a history of severe hyperlipidemia. She was placed on Lipitor 20 mg daily and developed pain in her legs, this was stopped and she was placed on Crestor 5 mg daily, although when I saw her on , she had not started that. She also complained of pain in her right calf, which began in the first part of August.  We did an ultrasound, which was negative for DVT. I did an ultrasound of her right lower extremity, which was negative for DVT, and I placed her on Crestor 5 mg daily, Aldactone 25 mg daily and kept her on Cardizem  mg daily. Unfortunately, she has not done well. She had recurrent urinary tract infections due to Klebsiella and was on IV infusions. She was started on meropenem IV infusions daily. She began developing severe myalgias. Initially, Lipitor was stopped and I placed her on Crestor 5 mg daily along with diltiazem; however, her myalgias continued. She stopped her diltiazem and her Crestor and was off all medications and her myalgias improved. Diltiazem was restarted on 11/10, and myalgias worsened. This was stopped again on 2022. 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 She does have a chronic obstruction in her upper pole moiety and there is plan for a possible left upper nephrectomy if her UTIs continue. She has had no chest pain or chest discomfort. She has had no palpitations as of yet. She is planning to go to [facility] on , to see a urologist for possible left upper nephrectomy. When I see her today, she still is not feeling well. She still has pain in both legs, although they are better. Her right leg is markedly swollen as compared to her left leg. There is also tenderness in her calf as it was when I saw her in September. She is currently off all medications. She has never had a myocardial infarction or cardiac catheterization. CARDIAC RISK FACTORS:  Hypertension:  Negative. Peripheral Vascular Disease:  Negative. Hyperlipidemia:  Positive. Other Family Members:  Negative. Smoking:  Negative. Diabetes:  Negative. MEDICATIONS AT THIS TIME:  She is off all medications. PAST MEDICAL AND SURGICAL HISTORY:  1. Sleep apnea, on a CPAP mask. 2.  Bell's palsy on 2013. 3.  Carpal tunnel syndrome. 4.  Hydronephrosis in her left kidney in .  5.  Hyperlipidemia since . 6.  . 7.  Hysterectomy in . 8.  Rotator cuff repair in the left in 2017.  9. SVT at 200 beats per minute documented in the emergency room. FAMILY HISTORY:  Mother had valve replacement. Sister had atrial fibrillation.     SOCIAL HISTORY:  She is 61years old,  to Willma Pack Giuliana Burciaga. 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 Stays active on the farm. REVIEW OF SYSTEMS:  Cardiac as above. Other systems reviewed including constitutional, eyes, ears, nose and throat, cardiovascular, respiratory, GI, , musculoskeletal, integumentary, neurologic, endocrine, hematologic and allergic/immunologic, are negative except for what is described above. No weight loss or weight gain. No change in bowels. No blood in stool. No fevers, sweats or chills. PHYSICAL EXAMINATION:  VITAL SIGNS:  Her blood pressure was 160/80 with a heart rate of 102 and regular. Respiratory rate 18. O2 sat 98%. Weight 312 pounds. GENERAL:  She is a very pleasant 54-year-old female. Denied pain. She was oriented to person, place and time. Answered questions appropriately. SKIN:  No unusual skin changes. HEENT:  The pupils are equally round and intact. Mucous membranes were dry. NECK:  No JVD. Good carotid pulses. No carotid bruits. No lymphadenopathy or thyromegaly. CARDIOVASCULAR EXAM:  S1 and S2 were normal.  No S3 or S4. Soft systolic blowing type murmur. No diastolic murmur. PMI was normal.  No lift, thrust, or pericardial friction rub. LUNGS:  Quite clear to auscultation and percussion. ABDOMEN:  Soft and nontender. Good bowel sounds. EXTREMITIES:  Good femoral pulses. Good pedal pulses. She had 3+ edema in the right lower extremity with tenderness in her right calf. She had 2+ in the left lower extremity with slight tenderness in the calf. NEUROLOGIC EXAM:  Unremarkable. PSYCHIATRIC EXAM:  Unremarkable. LABORATORY DATA:  On 11/13, sodium 139, potassium 4.2, BUN 8, creatinine 0.45. Her calcium was 9.3. Cholesterol 220 with an HDL of 33, , triglycerides 220. Her glucose was 108. White count 7.5, hemoglobin 13.0 with a platelet count of 257,081.     EKG has been normal.    Cardiolite stress test on 08/11/2022, was normal.    Echocardiogram on 08/08/2022, showed normal LV function, EF of 60% with normal right-sided chambers. CT scan of abdomen and pelvis showed bilateral left renal collecting system duplication with hydronephrosis and hydroureter of the upper pole of the left kidney with a cystic structure in the left pelvis. IMPRESSION:  1.  SVT, which was controlled with Cardizem  mg daily, however, stopped because of myalgias, which appeared to be secondary to Cardizem. 2.  Familial hyperlipidemia with an LDL to 150 range, with myalgias with Lipitor. 3.  Marked edema in both lower extremities, with the right greater than left, with a negative lower extremity ultrasound for DVT on 79/65/2963.  4.  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 5.  Normal Lexiscan stress test.  6.  Normal echocardiogram.  7.  Sleep apnea, on a CPAP mask. 8.  Recurrent UTIs, treated with meropenem infusions daily for two weeks. PLAN:  1. We will place her on Inspra 25 mg b.i.d. for her lower extremity edema, which I think may be secondary to her sleep apnea. 2.  We will follow up in one week to evaluate her edema while doing a BMP, magnesium and CK. 3.  If there is no change in her edema and pain in her right lower extremity, we will repeat her ultrasound to make sure there is no DVT. 4.  Expect reoccurrence of her SVT, which historically had heart rates in the 90s and therefore plan on treating with flecainide 50 mg b.i.d.  5.  We will place a 30-day event recorder when we do place her on flecainide to make sure she has no proarrhythmias and to monitor for SVT. DISCUSSION:  Antonio Reyes is fairly complex. From a cardiac standpoint, she has recurrent SVTs up to 200 beats per minute, which is quite symptomatic.   These were controlled with Cardizem, but she developed what appears to be very rare side effect with marked myalgias, which responded to stopping Cardizem and reappeared when Cardizem was restarted. She had been on Lipitor and had some lower extremity discomfort, but it worsened with Cardizem. I had tried her on Crestor, but again she was taking Cardizem at that time. Her myalgias are improving although not completely gone off from Cardizem and Crestor. At this time, we will not complicate her issue by attempting to treat her hyperlipidemia. I will do a CK in one week when I repeat her BMP and magnesium to see if there is any evidence of myositis. Almost certainly her SVT will reoccur. Again, it is at 200 beats per minute and symptomatic and therefore, I would treat preemptively. We will let her recover from her myalgias and when they have subsided, which would be hopefully soon, we will place her on flecainide 50 mg b.i.d. We will have a 30-day event recorder to monitor for any arrhythmias. The possibility of proarrhythmias would be very unusual in view of normal stress test and normal echocardiogram.    Again, she does have much more edema in her right lower extremity, although she has moderate edema in the left lower extremity. I will treat with Inspra 25 mg b.i.d., which would be her only drug at this time. We will see if she responds to Inspra in one week. If there is no change, then we would do again an ultrasound of her right and left lower extremity. I will not complicate the picture by placing her on more than one medication at a time. Therefore, she is on Inspra and we will watch for worsening of her muscle pain. Later, if we have demonstrated that there are no side effects from Inspra, then I would add flecainide. I would like to have her treated for SVT before she has her left upper pole nephrectomy if possible, although I would not delay surgery based on the non-addition of flecainide. I will meet with her in one week to re-look at her edema.   We will also at that time do a basic metabolic profile along with magnesium and CK. Thank you very much for allowing me the privilege of seeing Mrs. Fany Michaels. If you have any questions on my thoughts, please do not hesitate to contact me.      Sincerely,        Annette Alberto    D: 11/23/2022 9:48:36     T: 11/23/2022 9:56:18     [email or web address]  Job#: [id number]   Doc#: [id number]

## 2022-11-29 ENCOUNTER — HOSPITAL ENCOUNTER (OUTPATIENT)
Age: 57
Discharge: HOME | End: 2022-11-29
Payer: COMMERCIAL

## 2022-11-29 ENCOUNTER — OFFICE VISIT (OUTPATIENT)
Dept: CARDIOLOGY CLINIC | Age: 57
End: 2022-11-29
Payer: COMMERCIAL

## 2022-11-29 VITALS
OXYGEN SATURATION: 96 % | WEIGHT: 293 LBS | BODY MASS INDEX: 44.62 KG/M2 | SYSTOLIC BLOOD PRESSURE: 155 MMHG | DIASTOLIC BLOOD PRESSURE: 84 MMHG | HEART RATE: 90 BPM

## 2022-11-29 DIAGNOSIS — R60.0 LEG EDEMA, RIGHT: ICD-10-CM

## 2022-11-29 DIAGNOSIS — I47.1 SVT (SUPRAVENTRICULAR TACHYCARDIA) (HCC): ICD-10-CM

## 2022-11-29 DIAGNOSIS — I47.1 SVT (SUPRAVENTRICULAR TACHYCARDIA) (HCC): Primary | ICD-10-CM

## 2022-11-29 LAB
ANION GAP SERPL CALCULATED.3IONS-SCNC: 12 MMOL/L (ref 9–17)
BUN BLDV-MCNC: 12 MG/DL (ref 6–20)
BUN/CREAT BLD: 23 (ref 9–20)
CALCIUM SERPL-MCNC: 9.3 MG/DL (ref 8.6–10.4)
CHLORIDE BLD-SCNC: 106 MMOL/L (ref 98–107)
CO2: 22 MMOL/L (ref 20–31)
CREAT SERPL-MCNC: 0.53 MG/DL (ref 0.5–0.9)
GFR SERPL CREATININE-BSD FRML MDRD: >60 ML/MIN/1.73M2
GLUCOSE BLD-MCNC: 112 MG/DL (ref 70–99)
MAGNESIUM: 2 MG/DL (ref 1.6–2.6)
POTASSIUM SERPL-SCNC: 4.5 MMOL/L (ref 3.7–5.3)
SODIUM BLD-SCNC: 140 MMOL/L (ref 135–144)
TOTAL CK: 64 U/L (ref 26–192)

## 2022-11-29 PROCEDURE — 80048 BASIC METABOLIC PNL TOTAL CA: CPT

## 2022-11-29 PROCEDURE — 36415 COLL VENOUS BLD VENIPUNCTURE: CPT

## 2022-11-29 PROCEDURE — 83735 ASSAY OF MAGNESIUM: CPT

## 2022-11-29 PROCEDURE — 82550 ASSAY OF CK (CPK): CPT

## 2022-11-29 PROCEDURE — 99212 OFFICE O/P EST SF 10 MIN: CPT | Performed by: INTERNAL MEDICINE

## 2022-11-29 RX ORDER — PREDNISONE 20 MG/1
20 TABLET ORAL DAILY
Qty: 7 TABLET | Refills: 0 | Status: SHIPPED | OUTPATIENT
Start: 2022-11-29 | End: 2022-12-06

## 2022-11-29 NOTE — PROGRESS NOTES
Ov Dr. Mary Andres for 1 week f/u   Edema some better last few days       Will ADD Prednisone 20 mg one daily x 7 days     Follow up in one week no testing

## 2022-11-29 NOTE — LETTER
Warden Jackson MD  [facility]  [address], [facility]  [phone]      2022      Helena Cbob MD  [address] [facility], [facility]      RE:   Loco Speak  :  1965      Dear Dr. Tamiko Connell:    CHIEF COMPLAINT:  1.  Edema, both lower extremities, which is improved. 2.  Myalgias. HISTORY OF PRESENT ILLNESS:  I met with Marcio Ramires in our office on 2022. I trust you received my full H and P from 2022. As you know, she developed significant myalgias with Cardizem and with Crestor. Both of them were stopped. She developed a fair amount of edema in both lower extremities, and I placed her on Inspra 25 mg daily and brought her back for reevaluation. Her edema has improved, especially in the last 3 days, although it is still present. Myalgias are still present with perhaps a slight improvement. Her blood work looked good with sodium 140, potassium was 4.5, BUN was 12, and creatinine was 0.53. Her CK was normal at 64. She seems like she is tolerating her Inspra without issues. It also shows that her myalgias are not muscle destruction. You did an autoimmune workup, which was negative. I am going to give her a short course of prednisone at 20 mg daily for 1 week, and I will bring her back in 1 week for reevaluation. If her edema is not changed in a week, I will increase Inspra to 50 mg b.i.d. Again, my plan is to control her edema. When her myalgias are better, then I will place her on flecainide 50 mg b.i.d. to control her SVT. Thank you very much for allowing me the privilege of seeing Mrs. Philippe Hays. If you have any questions on my thoughts, please do not hesitate to contact me.      Sincerely,        Janae Stone    D: 2022 7:46:32     T: 2022 7:48:59     [id number]  Job#: [id number]   Doc#: [id number]

## 2022-11-30 NOTE — PROGRESS NOTES
Odette Roland MD  [facility]  [address], [place] [address]  [phone]      2022      Isabella Saxena MD  [address], [facility]      RE:   Viral Winston  :  1965      Dear Dr. Raphael Maciel:    CHIEF COMPLAINT:  1.  Edema, both lower extremities, which is improved. 2.  Myalgias. HISTORY OF PRESENT ILLNESS:  I met with Kerry James in our office on 2022. I trust you received my full H and P from 2022. As you know, she developed significant myalgias with Cardizem and with Crestor. Both of them were stopped. She developed a fair amount of edema in both lower extremities, and I placed her on Inspra 25 mg daily and brought her back for reevaluation. Her edema has improved, especially in the last 3 days, although it is still present. Myalgias are still present with perhaps a slight improvement. Her blood work looked good with sodium 140, potassium was 4.5, BUN was 12, and creatinine was 0.53. Her CK was normal at 64. She seems like she is tolerating her Inspra without issues. It also shows that her myalgias are not muscle destruction. You did an autoimmune workup, which was negative. I am going to give her a short course of prednisone at 20 mg daily for 1 week, and I will bring her back in 1 week for reevaluation. If her edema is not changed in a week, I will increase Inspra to 50 mg b.i.d. Again, my plan is to control her edema. When her myalgias are better, then I will place her on flecainide 50 mg b.i.d. to control her SVT. Thank you very much for allowing me the privilege of seeing Mrs. Rancho Cramer. If you have any questions on my thoughts, please do not hesitate to contact me.      Sincerely,        Meghan Angulo    D: 2022 7:46:32     T: 2022 7:48:59     [email or web address]  Job#: [id number]   Doc#: [id number]

## 2022-12-06 ENCOUNTER — OFFICE VISIT (OUTPATIENT)
Dept: CARDIOLOGY CLINIC | Age: 57
End: 2022-12-06
Payer: COMMERCIAL

## 2022-12-06 ENCOUNTER — OFFICE VISIT (OUTPATIENT)
Dept: FAMILY MEDICINE CLINIC | Age: 57
End: 2022-12-06
Payer: COMMERCIAL

## 2022-12-06 VITALS
HEART RATE: 84 BPM | TEMPERATURE: 98.4 F | OXYGEN SATURATION: 97 % | SYSTOLIC BLOOD PRESSURE: 154 MMHG | DIASTOLIC BLOOD PRESSURE: 84 MMHG

## 2022-12-06 DIAGNOSIS — R06.02 SOB (SHORTNESS OF BREATH): ICD-10-CM

## 2022-12-06 DIAGNOSIS — J06.9 VIRAL URI: Primary | ICD-10-CM

## 2022-12-06 DIAGNOSIS — R09.82 PND (POST-NASAL DRIP): ICD-10-CM

## 2022-12-06 DIAGNOSIS — E78.5 HYPERLIPIDEMIA, UNSPECIFIED HYPERLIPIDEMIA TYPE: Primary | ICD-10-CM

## 2022-12-06 DIAGNOSIS — R60.0 LEG EDEMA, RIGHT: ICD-10-CM

## 2022-12-06 PROCEDURE — 99212 OFFICE O/P EST SF 10 MIN: CPT | Performed by: INTERNAL MEDICINE

## 2022-12-06 PROCEDURE — 99213 OFFICE O/P EST LOW 20 MIN: CPT | Performed by: NURSE PRACTITIONER

## 2022-12-06 RX ORDER — EPLERENONE 25 MG/1
TABLET, FILM COATED ORAL
Qty: 90 TABLET | Refills: 6 | Status: SHIPPED | OUTPATIENT
Start: 2022-12-06

## 2022-12-06 RX ORDER — BENZONATATE 200 MG/1
200 CAPSULE ORAL 3 TIMES DAILY PRN
Qty: 21 CAPSULE | Refills: 0 | Status: SHIPPED | OUTPATIENT
Start: 2022-12-06 | End: 2022-12-13

## 2022-12-06 RX ORDER — EPLERENONE 25 MG/1
TABLET, FILM COATED ORAL
COMMUNITY
End: 2022-12-06 | Stop reason: SDUPTHER

## 2022-12-06 ASSESSMENT — ENCOUNTER SYMPTOMS
RHINORRHEA: 0
DIARRHEA: 0
COUGH: 1
SHORTNESS OF BREATH: 0
NAUSEA: 0
VOMITING: 0
SORE THROAT: 1

## 2022-12-06 NOTE — PROGRESS NOTES
Ov Dr. Lolis Price for one week follow up    Will Increase Inspra 25 mg to 2 tablets (50 mg) in am and   1 tablet (25 mg ) in pm     Will follow up in 1 week with BMP prior

## 2022-12-06 NOTE — LETTER
[address]  [address] [facility] [address]  Phone: [phone]  Fax: Forest Salmon 5938, APRN - CNP        December 6, 2022    [address] [place] [address] [facility]      Dear Matthew Carter:    Coricidin HBP Cold and Cough 1 tab every 6 hours as needed (nasal decongestant and antihistamine)  Flonase 1 spray each nostril twice daily (nasal steroid)  Ibuprofen 3 times a day as needed (antiinflammatory)  Zinc Sulfate 50mg Daily  Vitamin C 1000mg Daily  Vitamin D3 2000IU Daily   Warm tea with 1tbsp honey (soothes the throat)  Increase water intake  Rest      If you have any questions or concerns, please don't hesitate to call. Sincerely,          Dr Ryan Webster.  Stone APRN-CNP

## 2022-12-06 NOTE — PROGRESS NOTES
HPI Notes    Name: Faith Medina  : 1965         Chief Complaint:     Chief Complaint   Patient presents with    Cough     Patient here today with a cough that started yesterday. No fever. Cough really bad last night. History of Present Illness:        URI   This is a new problem. The current episode started in the past 7 days. The problem has been gradually worsening. There has been no fever. Associated symptoms include congestion, coughing, headaches, a plugged ear sensation and a sore throat. Pertinent negatives include no chest pain, diarrhea, nausea, rhinorrhea or vomiting.      Past Medical History:     Past Medical History:   Diagnosis Date    Bell's palsy 2013    Carpal tunnel syndrome     Chicken pox     Hydronephrosis 2009    left     Hyperlipidemia 2013    Myalgia 2022    Pneumonia     Renal atrophy     SVT (supraventricular tachycardia) ([facility] [address] 2022    UTI (urinary tract infection)       Reviewed all health maintenance requirements and ordered appropriate tests  Health Maintenance Due   Topic Date Due    HIV screen  Never done    DTaP/Tdap/Td vaccine (1 - Tdap) Never done    Shingles vaccine (1 of 2) Never done    COVID-19 Vaccine (3 - Booster for Camron Roers series) 2021    Flu vaccine (1) 2022       Past Surgical History:     Past Surgical History:   Procedure Laterality Date     SECTION      COLONOSCOPY N/A 2021    COLONOSCOPY performed by Elias Rich MD at [facility]      CYSTOSCOPY Left 2022    CYSTOSCOPY performed by Marla House MD at [address] Left 2022    CYSTOSCOPY URETERAL STENT INSERTION performed by Marla House MD at [address], [address] (CERVIX REMOVED)      ROTATOR CUFF REPAIR Left 2017    URETER SURGERY Left 2022    [facility]        Medications:       Prior to Admission medications    Medication Sig Start Date End Date Taking? Authorizing Provider   eplerenone (INSPRA) 25 MG tablet 2 tablets in am (50 mg) and 1 tablet in pm (25 mg)   Yes Historical Provider, MD        Allergies:       Sulfa antibiotics    Social History:     Tobacco:    reports that she has never smoked. She has never used smokeless tobacco.  Alcohol:      reports no history of alcohol use. Drug Use:  reports no history of drug use. Family History:     Family History   Problem Relation Age of Onset    Cancer Mother         breast    Heart Disease Mother     Stroke Mother     Cancer Father        Review of Systems:         Review of Systems   Constitutional:  Negative for chills and fever. HENT:  Positive for congestion and sore throat. Negative for rhinorrhea. Respiratory:  Positive for cough. Negative for shortness of breath. Cardiovascular:  Negative for chest pain and palpitations. Gastrointestinal:  Negative for diarrhea, nausea and vomiting. Neurological:  Positive for headaches. Negative for dizziness and seizures. Physical Exam:     Vitals:  BP (!) 154/84 (Site: Right Upper Arm)   Pulse 84   Temp 98.4 °F (36.9 °C) (Oral)   SpO2 97%       Physical Exam  Vitals and nursing note reviewed. Constitutional:       Appearance: She is well-developed. HENT:      Nose: Rhinorrhea present. Mouth/Throat:      Pharynx: Posterior oropharyngeal erythema present. No oropharyngeal exudate. Cardiovascular:      Rate and Rhythm: Normal rate and regular rhythm. Pulmonary:      Effort: Pulmonary effort is normal. No respiratory distress. Breath sounds: Normal breath sounds. Skin:     General: Skin is warm and dry. Neurological:      Mental Status: She is alert and oriented to person, place, and time.              Data:     Lab Results   Component Value Date/Time     11/29/2022 06:53 AM    K 4.5 11/29/2022 06:53 AM     11/29/2022 06:53 AM    CO2 22 11/29/2022 06:53 AM    BUN 12 11/29/2022 06:53 AM CREATININE 0.53 11/29/2022 06:53 AM    GLUCOSE 112 11/29/2022 06:53 AM    PROT 5.7 11/11/2022 08:40 AM    LABALBU 4.6 09/26/2022 03:53 PM    BILITOT 0.4 09/26/2022 03:53 PM    ALKPHOS 96 09/26/2022 03:53 PM    AST 20 09/26/2022 03:53 PM    ALT 32 09/26/2022 03:53 PM     Lab Results   Component Value Date/Time    WBC 7.5 11/13/2022 06:15 AM    RBC 4.43 11/13/2022 06:15 AM    HGB 13.0 11/13/2022 06:15 AM    HCT 40.1 11/13/2022 06:15 AM    MCV 90.5 11/13/2022 06:15 AM    MCH 29.3 11/13/2022 06:15 AM    MCHC 32.4 11/13/2022 06:15 AM    RDW 12.8 11/13/2022 06:15 AM     11/13/2022 06:15 AM    MPV 10.5 11/13/2022 06:15 AM     Lab Results   Component Value Date/Time    TSH 1.16 11/11/2022 08:40 AM     Lab Results   Component Value Date/Time    CHOL 220 11/21/2022 08:35 AM    CHOL 169 02/03/2014 12:00 AM    HDL 33 11/21/2022 08:35 AM    LABA1C 5.6 09/26/2022 03:53 PM          Assessment & Plan        Diagnosis Orders   1. Viral URI        2. PND (post-nasal drip)          Coricidin HBP Cold and Cough 1 tab every 6 hours as needed (nasal decongestant and antihistamine)  Flonase 1 spray each nostril twice daily (nasal steroid)  Ibuprofen 3 times a day as needed (antiinflammatory)  Zinc Sulfate 50mg Daily  Vitamin C 1000mg Daily  Vitamin D3 2000IU Daily   Warm tea with 1tbsp honey (soothes the throat)  Increase water intake  Rest                      Completed Refills   Requested Prescriptions      No prescriptions requested or ordered in this encounter     No follow-ups on file. No orders of the defined types were placed in this encounter. No orders of the defined types were placed in this encounter. Patient Instructions   SURVEY:    You may be receiving a survey from [employer] regarding your visit today. Please complete the survey to enable us to provide the highest quality of care to you and your family.     If you cannot score us a very good (5 Stars) on any question, please call the office to discuss how we could have made your experience a very good one. Thank you.     Clinical Care Team: DEVIN Gomez LPN    Clerical Team: Amandeep Jose   Electronically signed by ERNESTO Gomez CNP on 12/6/2022 at 9:36 AM           Completed Refills   Requested Prescriptions      No prescriptions requested or ordered in this encounter

## 2022-12-06 NOTE — PATIENT INSTRUCTIONS
Will Increase Inspra 25 mg to 2 tablets (50 mg) in am and   1 tablet (25 mg ) in pm     Will follow up in 1 week with BMP prior

## 2022-12-06 NOTE — PATIENT INSTRUCTIONS
SURVEY:    You may be receiving a survey from [employer] regarding your visit today. Please complete the survey to enable us to provide the highest quality of care to you and your family. If you cannot score us a very good (5 Stars) on any question, please call the office to discuss how we could have made your experience a very good one. Thank you.     Clinical Care Team: ERNESTO Jason-CARLOS Higginbotham LPN    Clerical Team: Ael De Los Santos

## 2022-12-07 NOTE — PROGRESS NOTES
Zane Graham M.D. [address] [place] [address]  [phone]        2022        Naheed Coronado MD  [address] [address]    RE:   Coy Moran  :  1965    Dear Dr. Sury García:    CHIEF COMPLAINT:  1. Severe myalgias. 2.  Edema in both lower extremities. 3.  Negative autoimmune workup. 4.  Followup with her having a 7-day course of 20 mg per day of steroids and reevaluate edema on Inspra 25 mg b.i.d. HISTORY OF PRESENT ILLNESS:  I met with Pita Acuña in our office on 2022. I trust you have been receiving my reports. My full H and P was from . She had severe myalgias that were thought to be from Cardizem and Crestor. Both of them were stopped and she developed a fair amount of edema in her lower extremities. I placed her on Inspra 25 mg b.i.d.  I also gave her prednisone 20 mg daily for one week and brought her back today for reevaluation. She has done excellent. Her muscle pain has completely subsided and has gone on the weeklong of prednisone. Her edema is also markedly improved, although there is still 2+ edema in both lower extremities. Her breathing is back to normal and again she feels excellent as far as muscle pain, which is nonexistent at this time. I am very surprised that her myalgias completely subsided with prednisone. This would point more to inflammatory or autoimmune cause rather than a drug cause from Crestor. I am anxious to see how she does off from the steroids now as her last day was yesterday. I will meet with her in one week to see if her myalgias return. I have increased her Inspra to 50 mg in the morning and 25 mg in the evening, and we will do a basic metabolic profile in one week.     If her myalgias are still gone in one week and if her edema is essentially gone, I will start flecainide at 50 mg b.i.d. and monitor with another 30-day event recorder to make sure there are no other arrhythmias present. Thank you very much for allowing me the privilege of seeing Mrs. Dayday Painting. If you have any questions on my thoughts, please do not hesitate to contact me.      Sincerely,        Suzette Foster    D: 12/06/2022 7:46:45     T: 12/07/2022 5:17:51     [email or web address]  Job#: [id number]   Doc#: [id number]

## 2022-12-10 PROBLEM — N39.0 URINARY TRACT INFECTION: Status: RESOLVED | Noted: 2022-11-10 | Resolved: 2022-12-10

## 2022-12-13 ENCOUNTER — HOSPITAL ENCOUNTER (OUTPATIENT)
Age: 57
Discharge: HOME | End: 2022-12-13
Payer: COMMERCIAL

## 2022-12-13 ENCOUNTER — OFFICE VISIT (OUTPATIENT)
Dept: CARDIOLOGY CLINIC | Age: 57
End: 2022-12-13

## 2022-12-13 VITALS
DIASTOLIC BLOOD PRESSURE: 80 MMHG | BODY MASS INDEX: 44.48 KG/M2 | OXYGEN SATURATION: 96 % | SYSTOLIC BLOOD PRESSURE: 161 MMHG | HEART RATE: 96 BPM | WEIGHT: 293 LBS

## 2022-12-13 DIAGNOSIS — R60.0 LEG EDEMA, RIGHT: ICD-10-CM

## 2022-12-13 DIAGNOSIS — R06.02 SOB (SHORTNESS OF BREATH): ICD-10-CM

## 2022-12-13 DIAGNOSIS — I47.1 SVT (SUPRAVENTRICULAR TACHYCARDIA) (HCC): Primary | ICD-10-CM

## 2022-12-13 LAB
ANION GAP SERPL CALCULATED.3IONS-SCNC: 10 MMOL/L (ref 9–17)
BUN BLDV-MCNC: 12 MG/DL (ref 6–20)
BUN/CREAT BLD: 18 (ref 9–20)
CALCIUM SERPL-MCNC: 9.4 MG/DL (ref 8.6–10.4)
CHLORIDE BLD-SCNC: 105 MMOL/L (ref 98–107)
CO2: 24 MMOL/L (ref 20–31)
CREAT SERPL-MCNC: 0.67 MG/DL (ref 0.5–0.9)
GFR SERPL CREATININE-BSD FRML MDRD: >60 ML/MIN/1.73M2
GLUCOSE BLD-MCNC: 128 MG/DL (ref 70–99)
POTASSIUM SERPL-SCNC: 4.1 MMOL/L (ref 3.7–5.3)
SODIUM BLD-SCNC: 139 MMOL/L (ref 135–144)

## 2022-12-13 PROCEDURE — 80048 BASIC METABOLIC PNL TOTAL CA: CPT

## 2022-12-13 PROCEDURE — 36415 COLL VENOUS BLD VENIPUNCTURE: CPT

## 2022-12-13 RX ORDER — FLECAINIDE ACETATE 50 MG/1
50 TABLET ORAL 2 TIMES DAILY
COMMUNITY
End: 2022-12-13 | Stop reason: SDUPTHER

## 2022-12-13 RX ORDER — EPLERENONE 25 MG/1
TABLET, FILM COATED ORAL
COMMUNITY

## 2022-12-13 RX ORDER — FLECAINIDE ACETATE 50 MG/1
50 TABLET ORAL 2 TIMES DAILY
Qty: 60 TABLET | Refills: 11 | Status: SHIPPED | OUTPATIENT
Start: 2022-12-13

## 2022-12-13 NOTE — PROGRESS NOTES
Ov Dr. Lee Noel for one week f/u   Edema better       Increase Inspra to 50 mg in am and 50 mg in pm     Will add Flecainide 50 mg one tablet twice a day     Will follow up on Tues with BMP and EKG prior

## 2022-12-13 NOTE — PATIENT INSTRUCTIONS
Increase Inspra to 50 mg in am and 50 mg in pm     Will add Flecainide 50 mg one tablet twice a day     Will follow up on Tues with BMP and EKG prior

## 2022-12-20 ENCOUNTER — HOSPITAL ENCOUNTER (OUTPATIENT)
Age: 57
Discharge: HOME | End: 2022-12-20
Payer: COMMERCIAL

## 2022-12-20 ENCOUNTER — HOSPITAL ENCOUNTER (OUTPATIENT)
Age: 57
Discharge: HOME | End: 2022-12-22
Payer: COMMERCIAL

## 2022-12-20 ENCOUNTER — HOSPITAL ENCOUNTER (OUTPATIENT)
Dept: NON INVASIVE DIAGNOSTICS | Age: 57
Discharge: HOME | End: 2022-12-20
Payer: COMMERCIAL

## 2022-12-20 ENCOUNTER — HOSPITAL ENCOUNTER (OUTPATIENT)
Dept: GENERAL RADIOLOGY | Age: 57
Discharge: HOME | End: 2022-12-22
Payer: COMMERCIAL

## 2022-12-20 ENCOUNTER — OFFICE VISIT (OUTPATIENT)
Dept: CARDIOLOGY CLINIC | Age: 57
End: 2022-12-20
Payer: COMMERCIAL

## 2022-12-20 VITALS
DIASTOLIC BLOOD PRESSURE: 84 MMHG | WEIGHT: 293 LBS | HEART RATE: 84 BPM | BODY MASS INDEX: 44.05 KG/M2 | OXYGEN SATURATION: 97 % | SYSTOLIC BLOOD PRESSURE: 169 MMHG

## 2022-12-20 DIAGNOSIS — E78.5 HYPERLIPIDEMIA, UNSPECIFIED HYPERLIPIDEMIA TYPE: ICD-10-CM

## 2022-12-20 DIAGNOSIS — R05.9 COUGH, UNSPECIFIED TYPE: ICD-10-CM

## 2022-12-20 DIAGNOSIS — I47.1 SVT (SUPRAVENTRICULAR TACHYCARDIA) (HCC): ICD-10-CM

## 2022-12-20 DIAGNOSIS — R05.9 COUGH, UNSPECIFIED TYPE: Primary | ICD-10-CM

## 2022-12-20 LAB
ANION GAP SERPL CALCULATED.3IONS-SCNC: 10 MMOL/L (ref 9–17)
BUN BLDV-MCNC: 12 MG/DL (ref 6–20)
BUN/CREAT BLD: 19 (ref 9–20)
CALCIUM SERPL-MCNC: 9.4 MG/DL (ref 8.6–10.4)
CHLORIDE BLD-SCNC: 103 MMOL/L (ref 98–107)
CO2: 24 MMOL/L (ref 20–31)
CREAT SERPL-MCNC: 0.62 MG/DL (ref 0.5–0.9)
EKG ATRIAL RATE: 78 BPM
EKG P AXIS: 74 DEGREES
EKG P-R INTERVAL: 166 MS
EKG Q-T INTERVAL: 390 MS
EKG QRS DURATION: 82 MS
EKG QTC CALCULATION (BAZETT): 444 MS
EKG R AXIS: 35 DEGREES
EKG T AXIS: 53 DEGREES
EKG VENTRICULAR RATE: 78 BPM
GFR SERPL CREATININE-BSD FRML MDRD: >60 ML/MIN/1.73M2
GLUCOSE BLD-MCNC: 112 MG/DL (ref 70–99)
POTASSIUM SERPL-SCNC: 4.1 MMOL/L (ref 3.7–5.3)
SODIUM BLD-SCNC: 137 MMOL/L (ref 135–144)

## 2022-12-20 PROCEDURE — 36415 COLL VENOUS BLD VENIPUNCTURE: CPT

## 2022-12-20 PROCEDURE — 93010 ELECTROCARDIOGRAM REPORT: CPT | Performed by: INTERNAL MEDICINE

## 2022-12-20 PROCEDURE — 80048 BASIC METABOLIC PNL TOTAL CA: CPT

## 2022-12-20 PROCEDURE — 99214 OFFICE O/P EST MOD 30 MIN: CPT | Performed by: INTERNAL MEDICINE

## 2022-12-20 PROCEDURE — 93270 REMOTE 30 DAY ECG REV/REPORT: CPT

## 2022-12-20 PROCEDURE — 71046 X-RAY EXAM CHEST 2 VIEWS: CPT

## 2022-12-20 PROCEDURE — 93271 ECG/MONITORING AND ANALYSIS: CPT

## 2022-12-20 PROCEDURE — 93005 ELECTROCARDIOGRAM TRACING: CPT

## 2022-12-20 NOTE — PROGRESS NOTES
Ov Dr. Chino Begun for one week follow up   Last week-   Increase Inspra to 50 mg in am and 50 mg in pm    Will add Flecainide 50 mg one tablet twice a day     Will get event monitor today     No changes    Weigh daily - if wt goes up few pounds   May take an extra Inspra     Cleared for surgery in Jan     Follow up in three months with cbc/cmp/tsh

## 2022-12-20 NOTE — PATIENT INSTRUCTIONS
Will get event montior today     No changes    Weigh daily - if wt goes up few pounds   May take an extra Inspra     Cleared for surgery in [place]     Follow up in three months with cbc/cmp/tsh

## 2022-12-20 NOTE — LETTER
Odette Roland M.D. [address] [place] [address], [facility]  [phone]        2022        Isabella Saxena MD  [address], [facility] [place] [facility]    RE:   Viral Winston  :  1965    Dear Dr. Raphael Maciel:    CHIEF COMPLAINT:  1. SVT with dizziness and lightheadedness with rates up to 190, with her not tolerating Cardizem, so far controlled with flecainide 50 mg b.i.d.  2.  Pending left upper nephrectomy at [facility] in January. HISTORY OF PRESENT ILLNESS:  I had the pleasure of seeing Mrs. Kerry James in our office on 2022. She is a pleasant 54-year-old female who I saw on 2022. She came to the emergency room on 2022, with SVT where she had a heart rate of 174. She had been on metoprolol for tachycardia. Because of her documented SVT on metoprolol, I stopped and placed her on Cardizem  daily and did a stress test and echocardiogram, which were normal.  She does have sleep apnea and has been on a CPAP mask for the last year. She does have a history of severe hyperlipidemia. She was placed on Lipitor 20 mg daily and developed pain in her legs, this was stopped and she was placed on Crestor 5 mg daily. She also complained of pain in her right calf, which began in the first part of August.  We did an ultrasound, which was negative for DVT. I placed her on Crestor and Aldactone and kept her on Cardizem  mg daily. She developed severe myalgias. Her Lipitor was stopped and I placed her on Crestor along with diltiazem; however, myalgias continued and we stopped diltiazem and Crestor and she was off all medication, her myalgias improved. Diltiazem was restarted on 11/10/2022, and the myalgias worsened. This was stopped again on 2022, and her myalgias subsided.     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 She has had no chest pain or chest discomfort. She developed marked edema in both legs. She had had an echocardiogram on 08/11/2022 that showed an EF of 60%, normal right-sided chambers and again had a Cardiolite stress test on 08/11/2022, that was normal.  Her EKG had been normal.    I placed her on Inspra 25 mg b.i.d. for lower extremity edema. We saw her in followup. I planned on placing her on flecainide. I saw her on 11/29. Her edema improved, although myalgias were present with slight improvement. I placed her on prednisone 20 mg daily and brought her back again on 12/06. At that time, her myalgias had completely gone and I increased her Inspra to 50 mg b.i.d.  I planned on seeing her again on 12/13, and at that time, I placed her on flecainide 50 mg b.i.d. We also increased her Inspra to 50 mg b.i.d. We brought her back today for reevaluation. She is doing well. Her left leg has minimal edema, right leg has 1 to 2+ edema, which is chronic. She had an EKG that showed normal sinus rhythm with a QTc interval of 444. She denies any chest pain or chest discomfort. She has had no PND or orthopnea. She is having again no chest pain or chest discomfort and no unusual shortness of breath. She has also had no further myalgias. She does complain of a cough several times a day, which is quite severe. CARDIAC RISK FACTORS:  Hypertension:  Negative. Peripheral Vascular Disease:  Negative. Hyperlipidemia:  Positive. Other Family Members:  Negative. Smoking:  Negative. Diabetes:  Negative. MEDICATIONS AT THIS TIME:  She is on flecainide 50 mg b.i.d. and Inspra 50 mg b.i.d. PAST MEDICAL AND SURGICAL HISTORY:  1. Sleep apnea, on a CPAP mask. 2.  Bell's palsy on 06/06/2013. 3.  Carpal tunnel syndrome.   4. Hydronephrosis in her left kidney in .  5.  Hyperlipidemia since , intolerant to Lipitor and Crestor. 6.  . 7.  Hysterectomy in . 8.  Rotator cuff repair in the left in 2017.  9. SVT at 200 beats per minute documented in the emergency room, with her having no further known SVTs since on flecainide, although we will place a 30-day event recorder. FAMILY HISTORY:  Mother had valve replacement. Sister had atrial fibrillation. SOCIAL HISTORY:  She is 61years old,  to [employer]. 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 Stays active in the farm. REVIEW OF SYSTEMS:  Cardiac as above. Other systems reviewed including constitutional, eyes, ears, nose and throat, cardiovascular, respiratory, GI, , musculoskeletal, integumentary, neurologic, endocrine, hematologic and allergic/immunologic, are negative except for what is described above. No weight loss or weight gain. No change in bowel habits. No blood in stool. No fevers, sweats or chills. PHYSICAL EXAMINATION:  VITAL SIGNS:  Her blood pressure was 130/70 with a heart rate of 70 and regular. Respiratory rate 18. O2 sat was 94%. GENERAL:  She is a very pleasant 28-year-old female. Denied pain. She was oriented to person, place and time. Answered questions appropriately. SKIN:  No unusual skin changes. HEENT:  The pupils are equally round and intact. Mucous membranes were dry. NECK:  No JVD. Good carotid pulses. No carotid bruits. No lymphadenopathy or thyromegaly. CARDIOVASCULAR EXAM:  S1 and S2 were normal.  No S3 or S4. Soft systolic blowing type murmur. No diastolic murmur. PMI was normal.  No lift, thrust, or pericardial friction rub. LUNGS:  Clear to auscultation and percussion. ABDOMEN:  Soft and nontender. Good bowel sounds. EXTREMITIES:  Good femoral pulses. Good pedal pulses.   She had 1+ left pedal edema and 2+ right pedal recovered, then we will deal with her hyperlipidemia. I would try her next on pitavastatin followed by Zetia and followed by a PCSK9 inhibitor. Thank you very much for allowing me the privilege of seeing Mrs. Giuliana Burciaga. If you have any questions on my thoughts, please do not hesitate to contact me.     Sincerely,        Maria Victoria Fernandez    D: 12/20/2022 7:50:52     T: 12/20/2022 7:57:51     [email or web address]  Job#: [id number]   Doc#: [id number]

## 2022-12-21 NOTE — PROGRESS NOTES
bladder with a 3.6 cm cystic lesion with chronic obstruction in the upper pole moiety and there was a plan for the left upper nephrectomy at [facility] in January. She has had no chest pain or chest discomfort. She developed marked edema in both legs. She had had an echocardiogram on 08/11/2022 that showed an EF of 60%, normal right-sided chambers and again had a Cardiolite stress test on 08/11/2022, that was normal.  Her EKG had been normal.    I placed her on Inspra 25 mg b.i.d. for lower extremity edema. We saw her in followup. I planned on placing her on flecainide. I saw her on 11/29. Her edema improved, although myalgias were present with slight improvement. I placed her on prednisone 20 mg daily and brought her back again on 12/06. At that time, her myalgias had completely gone and I increased her Inspra to 50 mg b.i.d.  I planned on seeing her again on 12/13, and at that time, I placed her on flecainide 50 mg b.i.d. We also increased her Inspra to 50 mg b.i.d. We brought her back today for reevaluation. She is doing well. Her left leg has minimal edema, right leg has 1 to 2+ edema, which is chronic. She had an EKG that showed normal sinus rhythm with a QTc interval of 444. She denies any chest pain or chest discomfort. She has had no PND or orthopnea. She is having again no chest pain or chest discomfort and no unusual shortness of breath. She has also had no further myalgias. She does complain of a cough several times a day, which is quite severe. CARDIAC RISK FACTORS:  Hypertension:  Negative. Peripheral Vascular Disease:  Negative. Hyperlipidemia:  Positive. Other Family Members:  Negative. Smoking:  Negative. Diabetes:  Negative. MEDICATIONS AT THIS TIME:  She is on flecainide 50 mg b.i.d. and Inspra 50 mg b.i.d. PAST MEDICAL AND SURGICAL HISTORY:  1. Sleep apnea, on a CPAP mask. 2.  Bell's palsy on 06/06/2013. 3.  Carpal tunnel syndrome.   4. Hydronephrosis in her left kidney in .  5.  Hyperlipidemia since , intolerant to Lipitor and Crestor. 6.  . 7.  Hysterectomy in . 8.  Rotator cuff repair in the left in 2017.  9. SVT at 200 beats per minute documented in the emergency room, with her having no further known SVTs since on flecainide, although we will place a 30-day event recorder. FAMILY HISTORY:  Mother had valve replacement. Sister had atrial fibrillation. SOCIAL HISTORY:  She is 61years old,  to [employer]. 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 Stays active in the farm. REVIEW OF SYSTEMS:  Cardiac as above. Other systems reviewed including constitutional, eyes, ears, nose and throat, cardiovascular, respiratory, GI, , musculoskeletal, integumentary, neurologic, endocrine, hematologic and allergic/immunologic, are negative except for what is described above. No weight loss or weight gain. No change in bowel habits. No blood in stool. No fevers, sweats or chills. PHYSICAL EXAMINATION:  VITAL SIGNS:  Her blood pressure was 130/70 with a heart rate of 70 and regular. Respiratory rate 18. O2 sat was 94%. GENERAL:  She is a very pleasant 69-year-old female. Denied pain. She was oriented to person, place and time. Answered questions appropriately. SKIN:  No unusual skin changes. HEENT:  The pupils are equally round and intact. Mucous membranes were dry. NECK:  No JVD. Good carotid pulses. No carotid bruits. No lymphadenopathy or thyromegaly. CARDIOVASCULAR EXAM:  S1 and S2 were normal.  No S3 or S4. Soft systolic blowing type murmur. No diastolic murmur. PMI was normal.  No lift, thrust, or pericardial friction rub. LUNGS:  Clear to auscultation and percussion. ABDOMEN:  Soft and nontender. Good bowel sounds. EXTREMITIES:  Good femoral pulses. Good pedal pulses.   She had 1+ left pedal edema and 2+ right pedal edema, which is chronic. NEUROLOGIC EXAM:  Unremarkable. PSYCHIATRIC EXAM:  Unremarkable. LABORATORY DATA:  Her Chem-7 today showed sodium 137, potassium 4.1, BUN 12, creatinine 0.62 with GFR greater than 60, calcium was 9.4. EKG today showed normal sinus rhythm and was normal with a QTc interval of 444. IMPRESSION:  1. SVT up to 190 beats per minute, controlled so far with flecainide 50 mg b.i.d., although we will place a 30-day event recorder. 2.  Severe myalgias with Cardizem and with Lipitor and Crestor, all independently. 3.  Familial hyperlipidemia with an LDL in the 150 range. 4.  Normal Myoview stress test on 08/11/2022.  5.  Normal LV function on echocardiogram on 08/11/2022 with an EF of 60% and trivial MR, mild TR with normal right-sided chambers. 6.  Cleared for left upper nephrectomy at [facility] in January because of duplication of left ureter with marked hydronephrosis of the ureter and upper pole of the left kidney with recurrent UTIs. 7.  Sleep apnea, on a CPAP mask. 8.  Severe cough, which occurs once or twice a day. PLAN:  1. We will place a 30-day event recorder. 2.  Cleared for surgery in January at [facility]. 3.  We will follow up in three months with CBC, CMP, TSH.  4.  We will do a chest x-ray today because of her cough. 5.  She will weigh daily, and if weight goes up by 3 to 4 pounds, will take an extra Inspra. DISCUSSION:  Mrs. Carole Jessica overall is doing well. Her edema has resolved and she is back to about her baseline. She is tolerating flecainide 50 mg b.i.d.  I will place a 30-day event recorder to make sure she has no arrhythmias and also monitor for any SVT, which may be asymptomatic. If she does have more SVT, I will increase flecainide to 100 mg b.i.d. or even 150 mg b.i.d. She does have a normal stress test and echocardiogram.  Therefore, there is no evidence of any ischemia present.     Later, after she has had her surgery and has recovered, then we will deal with her hyperlipidemia. I would try her next on pitavastatin followed by Zetia and followed by a PCSK9 inhibitor. Thank you very much for allowing me the privilege of seeing Mrs. Hernandez Going. If you have any questions on my thoughts, please do not hesitate to contact me.     Sincerely,        Christy Dominguez    D: 12/20/2022 7:50:52     T: 12/20/2022 7:57:51     [email or web address]  Job#: [id number]   Doc#: [id number]

## 2022-12-22 NOTE — PROGRESS NOTES
Cardiology    Edema better, however, still present. No side effects. Will increase Inspra to 50 mg bid and start Flecainide 50 mg bid. See in 1 week.     Carol Guzman MD

## 2023-01-03 RX ORDER — EPLERENONE 50 MG/1
TABLET, FILM COATED ORAL
Qty: 180 TABLET | Refills: 3 | Status: SHIPPED | OUTPATIENT
Start: 2023-01-03

## 2023-01-04 RX ORDER — FLECAINIDE ACETATE 50 MG/1
TABLET ORAL
Qty: 60 TABLET | Refills: 11 | Status: SHIPPED | OUTPATIENT
Start: 2023-01-04

## 2023-01-24 RX ORDER — ROSUVASTATIN CALCIUM 5 MG/1
TABLET, COATED ORAL
COMMUNITY
Start: 2022-12-14

## 2023-01-24 RX ORDER — METOPROLOL SUCCINATE 25 MG/1
TABLET, EXTENDED RELEASE ORAL
Qty: 90 TABLET | Refills: 1 | OUTPATIENT
Start: 2023-01-24

## 2023-01-25 RX ORDER — FLECAINIDE ACETATE 100 MG/1
100 TABLET ORAL 2 TIMES DAILY
COMMUNITY

## 2023-01-25 NOTE — TELEPHONE ENCOUNTER
Pt notified of event monitor  Will increase Tambocor 50 mg to   100mg bid  Pt has quite a bit of the 50 mg tablets left-   Will take 2 tablets twice a day   Then will call when rx is need  -at that time will call in 100mg tablets to   Take one bid

## 2023-03-06 RX ORDER — FLECAINIDE ACETATE 100 MG/1
100 TABLET ORAL 2 TIMES DAILY
Qty: 180 TABLET | Refills: 3 | Status: SHIPPED | OUTPATIENT
Start: 2023-03-06

## 2023-03-21 ENCOUNTER — OFFICE VISIT (OUTPATIENT)
Dept: CARDIOLOGY CLINIC | Age: 58
End: 2023-03-21
Payer: COMMERCIAL

## 2023-03-21 ENCOUNTER — HOSPITAL ENCOUNTER (OUTPATIENT)
Age: 58
Discharge: HOME | End: 2023-03-21
Payer: COMMERCIAL

## 2023-03-21 VITALS
DIASTOLIC BLOOD PRESSURE: 80 MMHG | HEART RATE: 85 BPM | OXYGEN SATURATION: 96 % | WEIGHT: 293 LBS | BODY MASS INDEX: 44.91 KG/M2 | SYSTOLIC BLOOD PRESSURE: 140 MMHG

## 2023-03-21 DIAGNOSIS — I47.1 SVT (SUPRAVENTRICULAR TACHYCARDIA) (HCC): ICD-10-CM

## 2023-03-21 DIAGNOSIS — R60.0 LEG EDEMA, RIGHT: ICD-10-CM

## 2023-03-21 DIAGNOSIS — E78.5 HYPERLIPIDEMIA, UNSPECIFIED HYPERLIPIDEMIA TYPE: ICD-10-CM

## 2023-03-21 DIAGNOSIS — R05.9 COUGH, UNSPECIFIED TYPE: ICD-10-CM

## 2023-03-21 DIAGNOSIS — I47.1 SVT (SUPRAVENTRICULAR TACHYCARDIA) (HCC): Primary | ICD-10-CM

## 2023-03-21 LAB
ABSOLUTE EOS #: 0.2 K/UL (ref 0–0.4)
ABSOLUTE LYMPH #: 2.2 K/UL (ref 1–4.8)
ABSOLUTE MONO #: 0.6 K/UL (ref 0–1)
ALBUMIN SERPL-MCNC: 4.2 G/DL (ref 3.5–5.2)
ALP SERPL-CCNC: 92 U/L (ref 35–104)
ALT SERPL-CCNC: 23 U/L (ref 5–33)
ANION GAP SERPL CALCULATED.3IONS-SCNC: 11 MMOL/L (ref 9–17)
AST SERPL-CCNC: 17 U/L
BASOPHILS # BLD: 1 % (ref 0–2)
BASOPHILS ABSOLUTE: 0.1 K/UL (ref 0–0.2)
BILIRUB SERPL-MCNC: 0.5 MG/DL (ref 0.3–1.2)
BUN SERPL-MCNC: 15 MG/DL (ref 6–20)
BUN/CREAT BLD: 19 (ref 9–20)
CALCIUM SERPL-MCNC: 9.2 MG/DL (ref 8.6–10.4)
CHLORIDE SERPL-SCNC: 107 MMOL/L (ref 98–107)
CO2 SERPL-SCNC: 23 MMOL/L (ref 20–31)
CREAT SERPL-MCNC: 0.8 MG/DL (ref 0.5–0.9)
DIFFERENTIAL TYPE: YES
EKG ATRIAL RATE: 74 BPM
EKG P AXIS: 64 DEGREES
EKG P-R INTERVAL: 184 MS
EKG Q-T INTERVAL: 388 MS
EKG QRS DURATION: 86 MS
EKG QTC CALCULATION (BAZETT): 430 MS
EKG R AXIS: 12 DEGREES
EKG T AXIS: 40 DEGREES
EKG VENTRICULAR RATE: 74 BPM
EOSINOPHILS RELATIVE PERCENT: 3 % (ref 0–5)
GFR SERPL CREATININE-BSD FRML MDRD: >60 ML/MIN/1.73M2
GLUCOSE SERPL-MCNC: 109 MG/DL (ref 70–99)
HCT VFR BLD AUTO: 39.4 % (ref 36–46)
HGB BLD-MCNC: 13.2 G/DL (ref 12–16)
LYMPHOCYTES # BLD: 29 % (ref 15–40)
MCH RBC QN AUTO: 28.9 PG (ref 26–34)
MCHC RBC AUTO-ENTMCNC: 33.4 G/DL (ref 31–37)
MCV RBC AUTO: 86.6 FL (ref 80–100)
MONOCYTES # BLD: 8 % (ref 4–8)
PDW BLD-RTO: 13.9 % (ref 12.1–15.2)
PLATELET # BLD AUTO: 378 K/UL (ref 140–450)
POTASSIUM SERPL-SCNC: 4.2 MMOL/L (ref 3.7–5.3)
PROT SERPL-MCNC: 6.8 G/DL (ref 6.4–8.3)
RBC # BLD: 4.55 M/UL (ref 4–5.2)
SEG NEUTROPHILS: 59 % (ref 47–75)
SEGMENTED NEUTROPHILS ABSOLUTE COUNT: 4.6 K/UL (ref 2.5–7)
SODIUM SERPL-SCNC: 141 MMOL/L (ref 135–144)
TSH SERPL-ACNC: 1.8 UIU/ML (ref 0.3–5)
WBC # BLD AUTO: 7.8 K/UL (ref 3.5–11)

## 2023-03-21 PROCEDURE — 99214 OFFICE O/P EST MOD 30 MIN: CPT | Performed by: INTERNAL MEDICINE

## 2023-03-21 PROCEDURE — 80053 COMPREHEN METABOLIC PANEL: CPT

## 2023-03-21 PROCEDURE — 36415 COLL VENOUS BLD VENIPUNCTURE: CPT

## 2023-03-21 PROCEDURE — 93010 ELECTROCARDIOGRAM REPORT: CPT | Performed by: INTERNAL MEDICINE

## 2023-03-21 PROCEDURE — 84443 ASSAY THYROID STIM HORMONE: CPT

## 2023-03-21 PROCEDURE — 93005 ELECTROCARDIOGRAM TRACING: CPT

## 2023-03-21 PROCEDURE — 85025 COMPLETE CBC W/AUTO DIFF WBC: CPT

## 2023-03-21 NOTE — LETTER
female. Denied pain. She was oriented to person, place and time. Answered questions appropriately. SKIN:  No unusual skin changes. HEENT:  The pupils are equally round and intact. Mucous membranes were dry. NECK:  No JVD. Good carotid pulses. No carotid bruits. No lymphadenopathy or thyromegaly. CARDIOVASCULAR EXAM:  S1 and S2 were normal.  No S3 or S4. Soft systolic blowing type murmur. No diastolic murmur. PMI was normal.  No lift, thrust, or pericardial friction rub. LUNGS:  Clear to auscultation and percussion. ABDOMEN:  Soft and nontender. Good bowel sounds. EXTREMITIES:  Good femoral pulses. Good pedal pulses. No pedal edema. Skin was warm and dry. No calf tenderness. Nail beds pink. Good cap refill. PULSES:  Bilateral symmetrical radial, brachial and carotid pulses. No carotid bruits. Good femoral and pedal pulses. NEUROLOGIC EXAM:  Within normal limits. PSYCHIATRIC EXAM:  Within normal limits. LABORATORY DATA:  Sodium 141, potassium 4.2, BUN 15, creatinine 0.80, GFR greater than 60, calcium 9.2. ALT was 23, AST was 17. TSH was 1.80. White count 7.8, hemoglobin 13.2 with a platelet count 765,507. EKG showed normal sinus rhythm, was normal.  Her QTc interval was 430 milliseconds. IMPRESSION:  1.  SVT, well controlled on flecainide 100 mg b.i.d., with only one episode when she forgot to take her flecainide. 2.  Severe myalgias with Cardizem, Lipitor and Crestor, all independently. 3.  Familial hyperlipidemia with an LDL in the 150 range. 4.  Normal Myoview stress test on 08/11/2022.  5.  Normal LV function with an EF of 60% with trivial MR and mild TR on 08/11/2022.  6.  Status post left upper robotic nephrectomy at [facility] on 37/84/6795, because of duplication of the left ureter and hydronephrosis of the ureter and upper pole of the left kidney with recurrent UTIs with a good recovery. 7.  Sleep apnea, on a CPAP mask. PLAN:  1.   No change in

## 2023-03-21 NOTE — PATIENT INSTRUCTIONS
Recommend a [employer] Mobile Device     E-Mail readings to us at [email or web address] OR   [email or web address]     Follow up in 6 months

## 2023-03-23 NOTE — PROGRESS NOTES
Ov Dr. Jerald Schultz for 3 month f/u   Had \"attack\" few weeks ago   Could feel palpations in neck   Sob- forgot to take meds   Had kidney removed 6 weeks ago   \"Was told still have three left\"       Recommend a Montoya Coughlin Device     E-Mail readings to us at [email or web address] OR   [email or web address]     Follow up in 6 months
medications. 2.  We will see in 6 months. 3.  She will get a Kardia device and record her arrhythmias if she has any palpitations. DISCUSSION:  Bree Huffman is doing excellent. She has made a good recovery from her left partial nephrectomy and ureterostomy. She has very little edema at this time, it is at baseline. Her SVT is well controlled on flecainide 100 mg b.i.d. She did have an episode when she forgot to take her medicine. I made no changes in her medications. I am delighted on her overall well-being and I look forward to seeing her in 6 months for EKG and blood work since she is on flecainide. Thank you very much for allowing me the privilege of seeing Mrs. Evette Ladd. If you have any questions on my thoughts, please do not hesitate to contact me.     Sincerely,        Susan Aguilar MD    D: 03/21/2023 8:00:51     T: 03/22/2023 3:17:50     [email or web address]  Job#: [id number]   Doc#: [id number]

## 2023-06-07 ENCOUNTER — HOSPITAL ENCOUNTER (OUTPATIENT)
Age: 58
Discharge: HOME | End: 2023-06-07
Payer: COMMERCIAL

## 2023-06-07 ENCOUNTER — OFFICE VISIT (OUTPATIENT)
Dept: FAMILY MEDICINE CLINIC | Age: 58
End: 2023-06-07
Payer: COMMERCIAL

## 2023-06-07 VITALS
OXYGEN SATURATION: 97 % | HEART RATE: 76 BPM | HEIGHT: 70 IN | BODY MASS INDEX: 41.95 KG/M2 | WEIGHT: 293 LBS | SYSTOLIC BLOOD PRESSURE: 138 MMHG | DIASTOLIC BLOOD PRESSURE: 76 MMHG

## 2023-06-07 DIAGNOSIS — R00.0 TACHYCARDIA: ICD-10-CM

## 2023-06-07 DIAGNOSIS — M79.604 PAIN IN BOTH LOWER EXTREMITIES: Primary | ICD-10-CM

## 2023-06-07 DIAGNOSIS — G47.39 OTHER SLEEP APNEA: ICD-10-CM

## 2023-06-07 DIAGNOSIS — M79.605 PAIN IN BOTH LOWER EXTREMITIES: Primary | ICD-10-CM

## 2023-06-07 DIAGNOSIS — M79.604 PAIN IN BOTH LOWER EXTREMITIES: ICD-10-CM

## 2023-06-07 DIAGNOSIS — M79.605 PAIN IN BOTH LOWER EXTREMITIES: ICD-10-CM

## 2023-06-07 LAB
CK SERPL-CCNC: 66 U/L (ref 26–192)
ERYTHROCYTE [SEDIMENTATION RATE] IN BLOOD BY WESTERGREN METHOD: 11 MM/HR (ref 0–30)
RHEUMATOID FACT SER NEPH-ACNC: <10 IU/ML

## 2023-06-07 PROCEDURE — 82550 ASSAY OF CK (CPK): CPT

## 2023-06-07 PROCEDURE — 36415 COLL VENOUS BLD VENIPUNCTURE: CPT

## 2023-06-07 PROCEDURE — 99214 OFFICE O/P EST MOD 30 MIN: CPT | Performed by: FAMILY MEDICINE

## 2023-06-07 PROCEDURE — 86431 RHEUMATOID FACTOR QUANT: CPT

## 2023-06-07 PROCEDURE — 85652 RBC SED RATE AUTOMATED: CPT

## 2023-06-07 PROCEDURE — 86038 ANTINUCLEAR ANTIBODIES: CPT

## 2023-06-07 PROCEDURE — 86225 DNA ANTIBODY NATIVE: CPT

## 2023-06-07 SDOH — ECONOMIC STABILITY: HOUSING INSECURITY
IN THE LAST 12 MONTHS, WAS THERE A TIME WHEN YOU DID NOT HAVE A STEADY PLACE TO SLEEP OR SLEPT IN A SHELTER (INCLUDING NOW)?: NO

## 2023-06-07 SDOH — ECONOMIC STABILITY: FOOD INSECURITY: WITHIN THE PAST 12 MONTHS, THE FOOD YOU BOUGHT JUST DIDN'T LAST AND YOU DIDN'T HAVE MONEY TO GET MORE.: NEVER TRUE

## 2023-06-07 SDOH — ECONOMIC STABILITY: INCOME INSECURITY: HOW HARD IS IT FOR YOU TO PAY FOR THE VERY BASICS LIKE FOOD, HOUSING, MEDICAL CARE, AND HEATING?: NOT VERY HARD

## 2023-06-07 SDOH — ECONOMIC STABILITY: FOOD INSECURITY: WITHIN THE PAST 12 MONTHS, YOU WORRIED THAT YOUR FOOD WOULD RUN OUT BEFORE YOU GOT MONEY TO BUY MORE.: NEVER TRUE

## 2023-06-07 ASSESSMENT — PATIENT HEALTH QUESTIONNAIRE - PHQ9
SUM OF ALL RESPONSES TO PHQ QUESTIONS 1-9: 0
SUM OF ALL RESPONSES TO PHQ QUESTIONS 1-9: 0
1. LITTLE INTEREST OR PLEASURE IN DOING THINGS: 0
2. FEELING DOWN, DEPRESSED OR HOPELESS: 0
SUM OF ALL RESPONSES TO PHQ QUESTIONS 1-9: 0
SUM OF ALL RESPONSES TO PHQ9 QUESTIONS 1 & 2: 0
SUM OF ALL RESPONSES TO PHQ QUESTIONS 1-9: 0

## 2023-06-07 NOTE — PATIENT INSTRUCTIONS
Survey: You may be receiving a survey from [email or web address] regarding your visit today. You may get this in the mail, through your MyChart or in your email. Please complete the survey to enable us to provide the highest quality of care to you and your family. Please also, mention our names. If you cannot score us as very good (5 Stars) on any question, please feel free to call the office to discuss how we could have made your experience exceptional.      Thank You!         Dr. Manual Hashimoto, MD Yuvonne Boxer, LPN

## 2023-06-07 NOTE — PROGRESS NOTES
CREATININE 0.80 03/21/2023 07:11 AM    GLUCOSE 109 03/21/2023 07:11 AM    PROT 6.8 03/21/2023 07:11 AM    LABALBU 4.2 03/21/2023 07:11 AM    BILITOT 0.5 03/21/2023 07:11 AM    ALKPHOS 92 03/21/2023 07:11 AM    AST 17 03/21/2023 07:11 AM    ALT 23 03/21/2023 07:11 AM     Lab Results   Component Value Date/Time    WBC 7.8 03/21/2023 07:11 AM    RBC 4.55 03/21/2023 07:11 AM    HGB 13.2 03/21/2023 07:11 AM    HCT 39.4 03/21/2023 07:11 AM    MCV 86.6 03/21/2023 07:11 AM    MCH 28.9 03/21/2023 07:11 AM    MCHC 33.4 03/21/2023 07:11 AM    RDW 13.9 03/21/2023 07:11 AM     03/21/2023 07:11 AM    MPV 10.5 11/13/2022 06:15 AM     Lab Results   Component Value Date/Time    TSH 1.80 03/21/2023 07:11 AM     Lab Results   Component Value Date/Time    CHOL 220 11/21/2022 08:35 AM    CHOL 169 02/03/2014 12:00 AM    HDL 33 11/21/2022 08:35 AM    LABA1C 5.6 09/26/2022 03:53 PM          Assessment/Plan:        1. Pain in both lower extremities  D/w pt that exam is WNL and normal strength and circulation. So will ck labs today to r/o autoimmune and rheumatoid arthritis   Also d/w pt if labs normal could it be the flecainide that she admits she started around the same time her legs started bothering her. - ASUNCION; Future  - Sedimentation rate, automated; Future  - Creatine Kinase; Future  - Rheumatoid Factor; Future    2. Tachycardia  Heart rate stable on flecanide    3. Other sleep apnea  Stable on CPAP    Return if symptoms worsen or fail to improve.       Electronically signed by Riana Chiu MD on 6/8/2023 at 11:17 PM

## 2023-06-08 LAB
ANA SER QL IA: NEGATIVE
DSDNA IGG SER QL IA: <0.5 IU/ML
NUCLEAR IGG SER IA-RTO: 0.2 U/ML

## 2023-06-08 ASSESSMENT — ENCOUNTER SYMPTOMS
DIARRHEA: 0
VOMITING: 0
CONSTIPATION: 0
BLOOD IN STOOL: 0
COUGH: 0
EYE DISCHARGE: 0
EYE REDNESS: 0
SHORTNESS OF BREATH: 0
NAUSEA: 0
ABDOMINAL PAIN: 0

## 2023-06-20 ENCOUNTER — HOSPITAL ENCOUNTER (OUTPATIENT)
Age: 58
Discharge: HOME | End: 2023-06-22
Payer: COMMERCIAL

## 2023-06-20 ENCOUNTER — HOSPITAL ENCOUNTER (OUTPATIENT)
Dept: GENERAL RADIOLOGY | Age: 58
Discharge: HOME | End: 2023-06-22
Payer: COMMERCIAL

## 2023-06-20 ENCOUNTER — OFFICE VISIT (OUTPATIENT)
Dept: FAMILY MEDICINE CLINIC | Age: 58
End: 2023-06-20
Payer: COMMERCIAL

## 2023-06-20 VITALS
HEART RATE: 71 BPM | BODY MASS INDEX: 45.34 KG/M2 | SYSTOLIC BLOOD PRESSURE: 124 MMHG | DIASTOLIC BLOOD PRESSURE: 70 MMHG | OXYGEN SATURATION: 97 % | WEIGHT: 293 LBS

## 2023-06-20 DIAGNOSIS — M79.671 RIGHT FOOT PAIN: ICD-10-CM

## 2023-06-20 DIAGNOSIS — M79.671 RIGHT FOOT PAIN: Primary | ICD-10-CM

## 2023-06-20 PROCEDURE — 73630 X-RAY EXAM OF FOOT: CPT

## 2023-06-20 PROCEDURE — 99214 OFFICE O/P EST MOD 30 MIN: CPT | Performed by: STUDENT IN AN ORGANIZED HEALTH CARE EDUCATION/TRAINING PROGRAM

## 2023-06-20 RX ORDER — MELOXICAM 15 MG/1
15 TABLET ORAL DAILY
Qty: 30 TABLET | Refills: 5 | Status: SHIPPED | OUTPATIENT
Start: 2023-06-20

## 2023-06-20 ASSESSMENT — ENCOUNTER SYMPTOMS
VOMITING: 0
ABDOMINAL PAIN: 0
DIARRHEA: 0
RHINORRHEA: 0
WHEEZING: 0
NAUSEA: 0
COUGH: 0
BACK PAIN: 0
SINUS PAIN: 0

## 2023-06-20 NOTE — PATIENT INSTRUCTIONS
SURVEY:    You may be receiving a survey from [employer] regarding your visit today. You may get this in the mail, through your MyChart or in your email. Please complete the survey to enable us to provide the highest quality of care to you and your family. If you cannot score us as very good ( 5 Stars) on any question, please feel free to call the office to discuss how we could have made your experience exceptional.     Thank you.     Clinical Care Team:  Dr. Glenroy Uribe, DO Girish Rothman, JIMMYN    Triage:  Eliza Valdivia, [address] [place] [address] Team:  Devyn Draper

## 2023-06-20 NOTE — PROGRESS NOTES
HPI Notes    Name: Justina Sethi  : 1965         Chief Complaint:     Chief Complaint   Patient presents with    Foot Pain     Patient here today for right foot pain that is predominantly on the side of her foot. Labs done on  showed no signs of rheumatoid or autoimmune or muscle disorders. History of Present Illness:        HPI    This is a 61year old woman presenting to discuss right foot pain, predominantly on the lateral edge of the foot. She did briefly discuss a similar discomfort with Dr. Lakshmi Ernandez, who ordered a RA workup, which was negative. She is reporting difficulty ambulating due to pain in the foot. This is described as a sharp discomfort localized to the lateral aspect of the foot, specifically the fifth metatarsal head, worse with aROM of the ankle, toes, and somewhat improved with rest. Icing has not improved it, neither has kinesio-tape. OTC ibuprofen 600 mg and naproxen 440 mg has not improved this discomfort as well. No history of trauma to the foot or falls.      Past Medical History:     Past Medical History:   Diagnosis Date    Bell's palsy 2013    Carpal tunnel syndrome     Chicken pox     Hydronephrosis 2009    left     Hyperlipidemia 2013    Myalgia 2022    Pneumonia     Renal atrophy     SVT (supraventricular tachycardia) ([facility] 2022    UTI (urinary tract infection)       Reviewed all health maintenance requirements and ordered appropriate tests  Health Maintenance Due   Topic Date Due    HIV screen  Never done    DTaP/Tdap/Td vaccine (1 - Tdap) Never done    Shingles vaccine (1 of 2) Never done    COVID-19 Vaccine (3 - Booster for Etienne Carota series) 2021       Past Surgical History:     Past Surgical History:   Procedure Laterality Date     SECTION      COLONOSCOPY N/A 2021    COLONOSCOPY performed by Maximino Mixon MD at [facility]      CYSTOSCOPY Left 2022    CYSTOSCOPY performed by Ole Thompson MD at

## 2023-06-22 DIAGNOSIS — S92.354A CLOSED NONDISPLACED FRACTURE OF FIFTH METATARSAL BONE OF RIGHT FOOT, INITIAL ENCOUNTER: Primary | ICD-10-CM

## 2023-07-12 ENCOUNTER — OFFICE VISIT (OUTPATIENT)
Dept: FAMILY MEDICINE CLINIC | Age: 58
End: 2023-07-12
Payer: COMMERCIAL

## 2023-07-12 VITALS
HEIGHT: 70 IN | HEART RATE: 76 BPM | SYSTOLIC BLOOD PRESSURE: 138 MMHG | BODY MASS INDEX: 41.95 KG/M2 | OXYGEN SATURATION: 95 % | DIASTOLIC BLOOD PRESSURE: 80 MMHG | WEIGHT: 293 LBS

## 2023-07-12 DIAGNOSIS — G47.39 OTHER SLEEP APNEA: ICD-10-CM

## 2023-07-12 DIAGNOSIS — I47.1 SVT (SUPRAVENTRICULAR TACHYCARDIA) (HCC): ICD-10-CM

## 2023-07-12 DIAGNOSIS — Z00.00 ANNUAL PHYSICAL EXAM: Primary | ICD-10-CM

## 2023-07-12 PROCEDURE — 99396 PREV VISIT EST AGE 40-64: CPT | Performed by: FAMILY MEDICINE

## 2023-07-12 ASSESSMENT — ENCOUNTER SYMPTOMS
DIARRHEA: 0
EYE REDNESS: 0
COUGH: 0
SHORTNESS OF BREATH: 0
NAUSEA: 0
EYE DISCHARGE: 0
BLOOD IN STOOL: 0
SORE THROAT: 0
VOMITING: 0
ABDOMINAL PAIN: 0
CONSTIPATION: 0

## 2023-07-12 NOTE — PATIENT INSTRUCTIONS
Survey: You may be receiving a survey from [employer] regarding your visit today. You may get this in the mail, through your MyChart or in your email. Please complete the survey to enable us to provide the highest quality of care to you and your family. Please also, mention our names. If you cannot score us as very good (5 Stars) on any question, please feel free to call the office to discuss how we could have made your experience exceptional.      Thank You!         Dr. Farrah Turcios MD    Unknown STARR Fonseca

## 2023-08-07 ENCOUNTER — TELEPHONE (OUTPATIENT)
Dept: FAMILY MEDICINE CLINIC | Age: 58
End: 2023-08-07

## 2023-08-07 DIAGNOSIS — Z12.31 VISIT FOR SCREENING MAMMOGRAM: Primary | ICD-10-CM

## 2023-08-07 NOTE — TELEPHONE ENCOUNTER
Mammogram ordered
tract infection due to ESBL Klebsiella     Myalgia     SVT (supraventricular tachycardia) (HCC)     Ectopic ureter

## 2023-08-18 ENCOUNTER — HOSPITAL ENCOUNTER (EMERGENCY)
Age: 58
Discharge: HOME | End: 2023-08-18
Attending: EMERGENCY MEDICINE
Payer: COMMERCIAL

## 2023-08-18 VITALS
TEMPERATURE: 98.7 F | DIASTOLIC BLOOD PRESSURE: 80 MMHG | WEIGHT: 293 LBS | BODY MASS INDEX: 41.95 KG/M2 | RESPIRATION RATE: 16 BRPM | HEIGHT: 70 IN | OXYGEN SATURATION: 97 % | HEART RATE: 64 BPM | SYSTOLIC BLOOD PRESSURE: 154 MMHG

## 2023-08-18 DIAGNOSIS — R07.89 ATYPICAL CHEST PAIN: Primary | ICD-10-CM

## 2023-08-18 LAB
ABSOLUTE BANDS #: 0.09 K/UL (ref 0–1)
ANION GAP SERPL CALCULATED.3IONS-SCNC: 9 MMOL/L (ref 9–17)
BANDS: 1 % (ref 0–10)
BASOPHILS # BLD: ABNORMAL K/UL (ref 0–0.2)
BASOPHILS NFR BLD: ABNORMAL % (ref 0–2)
BUN SERPL-MCNC: 17 MG/DL (ref 6–20)
BUN/CREAT SERPL: 21 (ref 9–20)
CALCIUM SERPL-MCNC: 10 MG/DL (ref 8.6–10.4)
CHLORIDE SERPL-SCNC: 105 MMOL/L (ref 98–107)
CO2 SERPL-SCNC: 24 MMOL/L (ref 20–31)
CREAT SERPL-MCNC: 0.8 MG/DL (ref 0.5–0.9)
EKG ATRIAL RATE: 66 BPM
EKG P AXIS: 52 DEGREES
EKG P-R INTERVAL: 198 MS
EKG Q-T INTERVAL: 428 MS
EKG QRS DURATION: 90 MS
EKG QTC CALCULATION (BAZETT): 448 MS
EKG R AXIS: -4 DEGREES
EKG T AXIS: 33 DEGREES
EKG VENTRICULAR RATE: 66 BPM
EOSINOPHIL # BLD: 0.26 K/UL (ref 0–0.4)
EOSINOPHILS RELATIVE PERCENT: 3 % (ref 0–5)
ERYTHROCYTE [DISTWIDTH] IN BLOOD BY AUTOMATED COUNT: 14.1 % (ref 12.1–15.2)
GFR SERPL CREATININE-BSD FRML MDRD: >60 ML/MIN/1.73M2
GLUCOSE SERPL-MCNC: 117 MG/DL (ref 70–99)
HCT VFR BLD AUTO: 39.3 % (ref 36–46)
HGB BLD-MCNC: 13.3 G/DL (ref 12–16)
IMM GRANULOCYTES # BLD AUTO: ABNORMAL K/UL (ref 0–0.3)
IMM GRANULOCYTES NFR BLD: ABNORMAL %
LYMPHOCYTES NFR BLD: 2.7 K/UL (ref 1–4.8)
LYMPHOCYTES RELATIVE PERCENT: 31 % (ref 15–40)
MCH RBC QN AUTO: 29.6 PG (ref 26–34)
MCHC RBC AUTO-ENTMCNC: 33.9 G/DL (ref 31–37)
MCV RBC AUTO: 87.5 FL (ref 80–100)
MONOCYTES NFR BLD: 0.78 K/UL (ref 0–1)
MONOCYTES NFR BLD: 9 % (ref 4–8)
MORPHOLOGY: ABNORMAL
NEUTROPHILS NFR BLD: 56 % (ref 47–75)
NEUTS SEG NFR BLD: 4.87 K/UL (ref 2.5–7)
PLATELET # BLD AUTO: 327 K/UL (ref 140–450)
POTASSIUM SERPL-SCNC: 4.2 MMOL/L (ref 3.7–5.3)
RBC # BLD AUTO: 4.49 M/UL (ref 4–5.2)
SODIUM SERPL-SCNC: 138 MMOL/L (ref 135–144)
TROPONIN I SERPL HS-MCNC: <6 NG/L (ref 0–14)
WBC OTHER # BLD: 8.7 K/UL (ref 3.5–11)

## 2023-08-18 PROCEDURE — 93005 ELECTROCARDIOGRAM TRACING: CPT | Performed by: EMERGENCY MEDICINE

## 2023-08-18 PROCEDURE — 85025 COMPLETE CBC W/AUTO DIFF WBC: CPT

## 2023-08-18 PROCEDURE — 84484 ASSAY OF TROPONIN QUANT: CPT

## 2023-08-18 PROCEDURE — 80048 BASIC METABOLIC PNL TOTAL CA: CPT

## 2023-08-18 PROCEDURE — 93010 ELECTROCARDIOGRAM REPORT: CPT | Performed by: INTERNAL MEDICINE

## 2023-08-18 PROCEDURE — 99284 EMERGENCY DEPT VISIT MOD MDM: CPT

## 2023-08-18 ASSESSMENT — ENCOUNTER SYMPTOMS
BACK PAIN: 0
SHORTNESS OF BREATH: 0
CHOKING: 0
CHEST TIGHTNESS: 1

## 2023-08-18 ASSESSMENT — PAIN DESCRIPTION - DESCRIPTORS: DESCRIPTORS: PRESSURE

## 2023-08-18 ASSESSMENT — LIFESTYLE VARIABLES
HOW OFTEN DO YOU HAVE A DRINK CONTAINING ALCOHOL: NEVER
HOW MANY STANDARD DRINKS CONTAINING ALCOHOL DO YOU HAVE ON A TYPICAL DAY: PATIENT DOES NOT DRINK

## 2023-08-18 ASSESSMENT — PAIN DESCRIPTION - LOCATION: LOCATION: CHEST

## 2023-08-18 ASSESSMENT — PAIN - FUNCTIONAL ASSESSMENT: PAIN_FUNCTIONAL_ASSESSMENT: 0-10

## 2023-08-18 ASSESSMENT — PAIN SCALES - GENERAL: PAINLEVEL_OUTOF10: 3

## 2023-08-18 ASSESSMENT — PAIN DESCRIPTION - PAIN TYPE: TYPE: ACUTE PAIN

## 2023-08-18 NOTE — ED PROVIDER NOTES
[address]      Pt Name: Troy Brooks  MRN: [id number]  [facility] 1965  Date of evaluation: 2023  Provider: Kelvin Olguin MD    CHIEF COMPLAINT       Chief Complaint   Patient presents with    Chest Pain     Started overnight, Pt stated been tired for past week. SOB and pressure to chest and right hand tingling. HISTORY OF PRESENT ILLNESS      Troy Brooks is a 60 y.o. female who presents to the emergency department pt with with band like chest pain for one week. It is intermittent but lasts most of the day. No fevers or cough. She has some leg swelling and fatigue. No fevers or cough. No pleuritic chest pain. No NVD. Nothing makes it better or worse. She is on flecanide for SVT and has not had any palpitations. She continues to take it. REVIEW OF SYSTEMS       Review of Systems   Constitutional:  Positive for fatigue. Negative for activity change, fever and unexpected weight change. HENT:  Negative for congestion. Respiratory:  Positive for chest tightness. Negative for choking and shortness of breath. Cardiovascular:  Positive for chest pain and leg swelling. Negative for palpitations. Endocrine: Negative for cold intolerance. Genitourinary:  Negative for dysuria. Musculoskeletal:  Negative for arthralgias, back pain and gait problem. Neurological:  Negative for dizziness. Psychiatric/Behavioral:  Negative for agitation.         PAST MEDICAL HISTORY     Past Medical History:   Diagnosis Date    Bell's palsy 2013    Carpal tunnel syndrome     Chicken pox     Hydronephrosis 2009    left     Hyperlipidemia 2013    Myalgia 2022    Pneumonia     Renal atrophy     SVT (supraventricular tachycardia) ([address]) 2022    UTI (urinary tract infection)          SURGICAL HISTORY       Past Surgical History:   Procedure Laterality Date     SECTION      COLONOSCOPY N/A 2021    COLONOSCOPY performed by Sima Hunt Discussion:        REASSESSMENT            CRITICAL CARE TIME     Total Critical Care time was  minutes, excluding separately reportable procedures. There was a high probability of clinically significant/life threatening deterioration in the patient's condition which required my urgent intervention. PROCEDURES:  Unless otherwise noted below, none     Procedures    FINAL IMPRESSION      1.  Atypical chest pain          DISPOSITION/PLAN     DISPOSITION Decision To Discharge 08/18/2023 07:21:06 AM      PATIENT REFERRED TO:  Esther Fabian MD  [address] [phone] [id number]  [phone]    In 1 week        DISCHARGE MEDICATIONS:  Current Discharge Medication List          (Please note that portions of this note were completed with a voice recognition program.  Efforts were made to edit the dictations but occasionally words are mis-transcribed.)    Suraj Rogers MD (electronically signed)  Attending Emergency Physician            Suraj Rogers MD  08/18/23 [id number]

## 2023-08-21 ENCOUNTER — TELEPHONE (OUTPATIENT)
Dept: CARDIOLOGY CLINIC | Age: 58
End: 2023-08-21

## 2023-08-21 RX ORDER — ASPIRIN 81 MG/1
81 TABLET ORAL DAILY
Qty: 90 TABLET | Refills: 3 | Status: SHIPPED | OUTPATIENT
Start: 2023-08-21

## 2023-08-21 NOTE — TELEPHONE ENCOUNTER
Lilian Cecil called to state that she was in the ED with CP on 8/18. She stated that she did take her BP at that time and it was okay. She stated that she is not having any chest pain today and the swelling in her feet is down. She does feel better, but she just feels that something isn't right. .    She states that she is very SOB and fatigued. She stated that if Dr. Betsy Swan wants to order any other test, she will come to [facility] for them or if he needs to see her sooner that Oct. 3rd she will make that work. .  She stated that she knows she has gained some weight, but feels it is more than that. She did state that the ED did tell her she was not having a heart attack, but she states something is off. .    Please advise

## 2023-08-22 ENCOUNTER — TELEPHONE (OUTPATIENT)
Dept: CARDIOLOGY CLINIC | Age: 58
End: 2023-08-22

## 2023-08-22 NOTE — TELEPHONE ENCOUNTER
Kaye mayberry with pt   Today I spoke with pt   Information given   Lopressor 25 mg 1/2 bid   Asa 81 mg qd  Appt aug 29 230   Per Dr. Che Hernandez

## 2023-08-29 ENCOUNTER — OFFICE VISIT (OUTPATIENT)
Dept: CARDIOLOGY CLINIC | Age: 58
End: 2023-08-29
Payer: COMMERCIAL

## 2023-08-29 ENCOUNTER — HOSPITAL ENCOUNTER (OUTPATIENT)
Age: 58
Discharge: HOME | End: 2023-08-31
Payer: COMMERCIAL

## 2023-08-29 ENCOUNTER — HOSPITAL ENCOUNTER (OUTPATIENT)
Dept: GENERAL RADIOLOGY | Age: 58
Discharge: HOME | End: 2023-08-31
Payer: COMMERCIAL

## 2023-08-29 ENCOUNTER — HOSPITAL ENCOUNTER (OUTPATIENT)
Age: 58
Discharge: HOME | End: 2023-08-29
Payer: COMMERCIAL

## 2023-08-29 VITALS
WEIGHT: 293 LBS | DIASTOLIC BLOOD PRESSURE: 70 MMHG | BODY MASS INDEX: 45.34 KG/M2 | HEART RATE: 68 BPM | SYSTOLIC BLOOD PRESSURE: 140 MMHG | OXYGEN SATURATION: 98 %

## 2023-08-29 DIAGNOSIS — R07.9 CHEST PAIN, UNSPECIFIED TYPE: ICD-10-CM

## 2023-08-29 DIAGNOSIS — R93.89 ABNORMAL CXR: Primary | ICD-10-CM

## 2023-08-29 DIAGNOSIS — I47.1 SVT (SUPRAVENTRICULAR TACHYCARDIA) (HCC): ICD-10-CM

## 2023-08-29 DIAGNOSIS — R06.02 SOB (SHORTNESS OF BREATH): ICD-10-CM

## 2023-08-29 LAB
CRP SERPL HS-MCNC: 6 MG/L (ref 0–5)
D DIMER PPP FEU-MCNC: 0.28 UG/ML FEU (ref 0–0.59)
ERYTHROCYTE [SEDIMENTATION RATE] IN BLOOD BY PHOTOMETRIC METHOD: 9 MM/HR (ref 0–30)

## 2023-08-29 PROCEDURE — 99214 OFFICE O/P EST MOD 30 MIN: CPT | Performed by: INTERNAL MEDICINE

## 2023-08-29 PROCEDURE — 86140 C-REACTIVE PROTEIN: CPT

## 2023-08-29 PROCEDURE — 71046 X-RAY EXAM CHEST 2 VIEWS: CPT

## 2023-08-29 PROCEDURE — 85379 FIBRIN DEGRADATION QUANT: CPT

## 2023-08-29 PROCEDURE — 85652 RBC SED RATE AUTOMATED: CPT

## 2023-08-29 PROCEDURE — 86235 NUCLEAR ANTIGEN ANTIBODY: CPT

## 2023-08-29 PROCEDURE — 36415 COLL VENOUS BLD VENIPUNCTURE: CPT

## 2023-08-29 RX ORDER — AMLODIPINE BESYLATE 5 MG/1
5 TABLET ORAL DAILY
COMMUNITY
End: 2023-08-29 | Stop reason: SDUPTHER

## 2023-08-29 RX ORDER — AMLODIPINE BESYLATE 5 MG/1
5 TABLET ORAL DAILY
Qty: 30 TABLET | Refills: 11 | Status: SHIPPED | OUTPATIENT
Start: 2023-08-29

## 2023-08-29 NOTE — PROGRESS NOTES
Charles ESCOBAR [facility] for ER follow up  Was in er for chest pain   C/o fatigue worse this last month   Some days having edema   Had sob yesterday comes and goes   Some days a lot worse per pt   Can feel palpitations at night   Will do kardia and says NSR   C/o chest heaviness   Wears cpap at night   Walked with pulse o2   Stayed at 98 %        Will set CXR/d.dimer/sed rate/amari/crp     Will ADD Norvasc 5 mg one daily     Will INCREASE Lopressor 25 mg to full tablet   Twice a day     Call on Tuesday

## 2023-08-29 NOTE — PATIENT INSTRUCTIONS
Will set CXR/d.dimer/sed rate/amari/crp     Will ADD Norvasc 5 mg one daily     Will INCREASE Lopressor 25 mg to full tablet   Twice a day     Call on Tuesday

## 2023-08-30 ENCOUNTER — HOSPITAL ENCOUNTER (OUTPATIENT)
Dept: CT IMAGING | Age: 58
Discharge: HOME | End: 2023-09-01
Attending: INTERNAL MEDICINE
Payer: COMMERCIAL

## 2023-08-30 ENCOUNTER — HOSPITAL ENCOUNTER (OUTPATIENT)
Dept: MAMMOGRAPHY | Age: 58
Discharge: HOME | End: 2023-09-01
Attending: FAMILY MEDICINE
Payer: COMMERCIAL

## 2023-08-30 DIAGNOSIS — R06.02 SOB (SHORTNESS OF BREATH): ICD-10-CM

## 2023-08-30 DIAGNOSIS — I47.1 SVT (SUPRAVENTRICULAR TACHYCARDIA) (HCC): ICD-10-CM

## 2023-08-30 DIAGNOSIS — R93.89 ABNORMAL CXR: ICD-10-CM

## 2023-08-30 DIAGNOSIS — R07.9 CHEST PAIN, UNSPECIFIED TYPE: ICD-10-CM

## 2023-08-30 DIAGNOSIS — Z12.31 VISIT FOR SCREENING MAMMOGRAM: ICD-10-CM

## 2023-08-30 PROCEDURE — 71275 CT ANGIOGRAPHY CHEST: CPT

## 2023-08-30 PROCEDURE — 6360000004 HC RX CONTRAST MEDICATION: Performed by: INTERNAL MEDICINE

## 2023-08-30 PROCEDURE — 77063 BREAST TOMOSYNTHESIS BI: CPT

## 2023-08-30 RX ADMIN — IOPAMIDOL 100 ML: 755 INJECTION, SOLUTION INTRAVENOUS at 13:41

## 2023-08-31 LAB
DEPRECATED S PNEUM5 IGG SER-MCNC: <0.4 U/ML
ENA JO1 IGG SER-ACNC: <0.4 U/ML
ENA SCL70 AB SER IA-ACNC: <0.6 U/ML
ENA SM AB SER-ACNC: 1.1 U/ML
ENA SS-A IGG SER QL: <0.3 U/ML
ENA SS-B IGG SER IA-ACNC: <0.3 U/ML
U1 SNRNP IGG SER IA-ACNC: 1.2 U/ML
U1 SNRNP IGG SER IA-ACNC: 1.4 U/ML

## 2023-08-31 NOTE — PROGRESS NOTES
Eleonora Camp MD  [facility]  [facility]  [address]  [facility]  [phone]      2023      Ting Mckeon MD  [address] [facility]      RE:   Annette Marvin  :  1965      Dear Dr. Meghan Bautista:    CHIEF COMPLAINT:  1. Chest pain with activity. 2.  Shortness of breath with activity. 3.  Marked loss of energy in the last month. HISTORY OF PRESENT ILLNESS:  I had the pleasure of seeing Remy Jacobson in our office on 2023. She is a very pleasant 50-year-old female who I follow for SVT, controlled with flecainide 50 mg b.i.d. This was first found on 2022 when she had an SVT at a heart rate of 174. She had been on metoprolol. Because of her documented SVT on metoprolol, I stopped metoprolol and placed her on Cardizem  mg daily. We did a stress test and an echocardiogram on 2022, which was normal.  Echocardiogram was also normal with an EF of 60%. She did have lower extremity edema and I placed her on Inspra 50 mg b.i.d. Sleep study was done. She had been on CPAP mask for one year. 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 She has a history of severe hyperlipidemia and has been tried on Lipitor and Crestor and had myalgias. She also had been on diltiazem and I stopped that at the same time with Crestor. Her myalgias resolved. We started diltiazem on 11/10/2022, and her myalgias worsened and this was stopped again on 2022, and all her myalgias subsided. We then placed her on flecainide 50 mg b.i.d. Again, we had done a Lexiscan stress test and an echocardiogram, which were both normal on 2022. She did have an episode of SVT and then began developing breakthrough SVTs.   We increased her

## 2023-09-11 ENCOUNTER — TELEPHONE (OUTPATIENT)
Dept: UROLOGY | Age: 58
End: 2023-09-11

## 2023-09-11 DIAGNOSIS — I47.1 SVT (SUPRAVENTRICULAR TACHYCARDIA) (HCC): Primary | ICD-10-CM

## 2023-09-11 DIAGNOSIS — R07.9 CHEST PAIN, UNSPECIFIED TYPE: ICD-10-CM

## 2023-09-11 DIAGNOSIS — Z01.818 PRE-OP TESTING: ICD-10-CM

## 2023-09-11 NOTE — TELEPHONE ENCOUNTER
Mark Severino called to state that she still does not feel well. She stated that the CP went away. She did have some last Thursday. She stated that she just doesn't feel normal.  She stated that she is very fatigued and winded when she just walks to her truck. She stated the toes on her right foot  and hands are hurting also. She is wondering if it is her medication making her feel this way. She does have an appt on 10/3.     Please advise

## 2023-09-11 NOTE — TELEPHONE ENCOUNTER
Per DR Hughes May will proceed with heart cath   On Sept 22nd at 12pm   Will bring pt in to sign consent and get testing done on 9/13

## 2023-09-13 ENCOUNTER — HOSPITAL ENCOUNTER (OUTPATIENT)
Age: 58
Discharge: HOME | End: 2023-09-13
Payer: COMMERCIAL

## 2023-09-13 ENCOUNTER — OFFICE VISIT (OUTPATIENT)
Dept: CARDIOLOGY CLINIC | Age: 58
End: 2023-09-13
Payer: COMMERCIAL

## 2023-09-13 DIAGNOSIS — R07.9 CHEST PAIN, UNSPECIFIED TYPE: ICD-10-CM

## 2023-09-13 DIAGNOSIS — R07.9 CHEST PAIN, UNSPECIFIED TYPE: Primary | ICD-10-CM

## 2023-09-13 DIAGNOSIS — Z01.818 PRE-OP TESTING: ICD-10-CM

## 2023-09-13 DIAGNOSIS — I47.1 SVT (SUPRAVENTRICULAR TACHYCARDIA) (HCC): ICD-10-CM

## 2023-09-13 LAB
ALBUMIN SERPL-MCNC: 4.3 G/DL (ref 3.5–5.2)
ALP SERPL-CCNC: 100 U/L (ref 35–104)
ALT SERPL-CCNC: 26 U/L (ref 5–33)
ANION GAP SERPL CALCULATED.3IONS-SCNC: 10 MMOL/L (ref 9–17)
AST SERPL-CCNC: 16 U/L
BASOPHILS # BLD: NORMAL K/UL (ref 0–0.2)
BASOPHILS NFR BLD: NORMAL % (ref 0–2)
BILIRUB SERPL-MCNC: 0.8 MG/DL (ref 0.3–1.2)
BUN SERPL-MCNC: 17 MG/DL (ref 6–20)
BUN/CREAT SERPL: 21 (ref 9–20)
CALCIUM SERPL-MCNC: 9.7 MG/DL (ref 8.6–10.4)
CHLORIDE SERPL-SCNC: 101 MMOL/L (ref 98–107)
CHOLEST SERPL-MCNC: 222 MG/DL (ref 0–199)
CHOLESTEROL/HDL RATIO: 7
CO2 SERPL-SCNC: 24 MMOL/L (ref 20–31)
CREAT SERPL-MCNC: 0.8 MG/DL (ref 0.5–0.9)
EKG ATRIAL RATE: 69 BPM
EKG P AXIS: 72 DEGREES
EKG P-R INTERVAL: 196 MS
EKG Q-T INTERVAL: 432 MS
EKG QRS DURATION: 94 MS
EKG QTC CALCULATION (BAZETT): 462 MS
EKG R AXIS: 31 DEGREES
EKG T AXIS: 51 DEGREES
EKG VENTRICULAR RATE: 69 BPM
EOSINOPHIL # BLD: 0.16 K/UL (ref 0–0.4)
EOSINOPHILS RELATIVE PERCENT: 2 % (ref 0–5)
ERYTHROCYTE [DISTWIDTH] IN BLOOD BY AUTOMATED COUNT: 13.4 % (ref 12.1–15.2)
GFR SERPL CREATININE-BSD FRML MDRD: >60 ML/MIN/1.73M2
GLUCOSE SERPL-MCNC: 104 MG/DL (ref 70–99)
HCT VFR BLD AUTO: 40.1 % (ref 36–46)
HDLC SERPL-MCNC: 33 MG/DL (ref 0–40)
HGB BLD-MCNC: 13.5 G/DL (ref 12–16)
IMM GRANULOCYTES # BLD AUTO: NORMAL K/UL (ref 0–0.3)
IMM GRANULOCYTES NFR BLD: NORMAL %
LDLC SERPL CALC-MCNC: 148 MG/DL (ref 0–100)
LYMPHOCYTES NFR BLD: 1.79 K/UL (ref 1–4.8)
LYMPHOCYTES RELATIVE PERCENT: 23 % (ref 15–40)
MCH RBC QN AUTO: 29.6 PG (ref 26–34)
MCHC RBC AUTO-ENTMCNC: 33.7 G/DL (ref 31–37)
MCV RBC AUTO: 87.8 FL (ref 80–100)
MONOCYTES NFR BLD: 0.47 K/UL (ref 0–1)
MONOCYTES NFR BLD: 6 % (ref 4–8)
MORPHOLOGY: NORMAL
NEUTROPHILS NFR BLD: 69 % (ref 47–75)
NEUTS SEG NFR BLD: 5.38 K/UL (ref 2.5–7)
PATIENT FASTING?: YES
PLATELET # BLD AUTO: 385 K/UL (ref 140–450)
POTASSIUM SERPL-SCNC: 4.4 MMOL/L (ref 3.7–5.3)
PROT SERPL-MCNC: 7.3 G/DL (ref 6.4–8.3)
RBC # BLD AUTO: 4.56 M/UL (ref 4–5.2)
SODIUM SERPL-SCNC: 135 MMOL/L (ref 135–144)
TRIGL SERPL-MCNC: 209 MG/DL (ref 0–149)
VLDLC SERPL CALC-MCNC: 42 MG/DL
WBC OTHER # BLD: 7.8 K/UL (ref 3.5–11)

## 2023-09-13 PROCEDURE — 36415 COLL VENOUS BLD VENIPUNCTURE: CPT

## 2023-09-13 PROCEDURE — 99212 OFFICE O/P EST SF 10 MIN: CPT | Performed by: INTERNAL MEDICINE

## 2023-09-13 PROCEDURE — 93005 ELECTROCARDIOGRAM TRACING: CPT

## 2023-09-13 PROCEDURE — 85025 COMPLETE CBC W/AUTO DIFF WBC: CPT

## 2023-09-13 PROCEDURE — 93010 ELECTROCARDIOGRAM REPORT: CPT | Performed by: INTERNAL MEDICINE

## 2023-09-13 PROCEDURE — 80053 COMPREHEN METABOLIC PANEL: CPT

## 2023-09-13 PROCEDURE — 80061 LIPID PANEL: CPT

## 2023-09-14 NOTE — PROGRESS NOTES
Yazmin Freed M.D. [facility]   [address] [facility]  [phone]          2023          Servando Reich MD  [address] [place] [address]        RE:   Mary Jane Chopra  :  1965      Dear Dr. Sky De Jesus:    I met with Mrs. Kiah Ziegler in the office on 2023. I trust you received my full report from 2023. We placed her on Norvasc and increased Lopressor to 25 mg b.i.d. We did do a CT scan, which was negative for any masses in her mediastinum. She states that she still feels bad. She was in the emergency room for chest pain, which was a negative workup. Her fatigue is worse, and her shortness of breath is worse. We did walk her in the ricci and her O2 saturation remained at 98%. I discussed proceeding directly with a cardiac catheterization to define her anatomy and she is in agreement. We will keep her on same medications and plan on doing her catheterization on 2023 in [facility]. If she develops more chest pain or if symptoms deteriorate, she is to call my cell phone. Thank you very much for allowing me the privilege of seeing Mrs. Mary Jane Chopra. I am very concerned concerning her heart condition. I will let you know the results after we do the heart cath.     Sincerely,        Ronda Melton MD    D: 2023 11:27:46     T: 2023 11:30:15     [email or web address]  Job#: [id number]   Doc#: [id number]

## 2023-09-20 RX ORDER — AMLODIPINE BESYLATE 5 MG/1
5 TABLET ORAL DAILY
Qty: 30 TABLET | Refills: 11 | Status: SHIPPED | OUTPATIENT
Start: 2023-09-20

## 2023-09-22 ENCOUNTER — PROCEDURE VISIT (OUTPATIENT)
Dept: CARDIOLOGY CLINIC | Age: 58
End: 2023-09-22

## 2023-09-22 DIAGNOSIS — I47.10 SVT (SUPRAVENTRICULAR TACHYCARDIA): ICD-10-CM

## 2023-09-22 DIAGNOSIS — R93.89 ABNORMAL CXR: ICD-10-CM

## 2023-09-22 DIAGNOSIS — R06.02 SOB (SHORTNESS OF BREATH): ICD-10-CM

## 2023-09-22 DIAGNOSIS — R07.9 CHEST PAIN, UNSPECIFIED TYPE: Primary | ICD-10-CM

## 2023-09-25 ENCOUNTER — TELEPHONE (OUTPATIENT)
Dept: CARDIOLOGY CLINIC | Age: 58
End: 2023-09-25

## 2023-09-25 DIAGNOSIS — I20.9 ANGINA, CLASS III (HCC): Primary | ICD-10-CM

## 2023-09-25 RX ORDER — ATORVASTATIN CALCIUM 80 MG/1
80 TABLET, FILM COATED ORAL DAILY
Qty: 90 TABLET | Refills: 3 | Status: SHIPPED | OUTPATIENT
Start: 2023-09-25

## 2023-11-08 ENCOUNTER — HOSPITAL ENCOUNTER (OUTPATIENT)
Dept: HOSPITAL 101 - CR | Age: 58
LOS: 26 days | Discharge: HOME | End: 2023-12-04
Payer: COMMERCIAL

## 2023-11-08 DIAGNOSIS — I20.9: Primary | ICD-10-CM

## 2023-11-08 PROCEDURE — 93798 PHYS/QHP OP CAR RHAB W/ECG: CPT

## 2023-11-09 DIAGNOSIS — R06.02 SOB (SHORTNESS OF BREATH): ICD-10-CM

## 2023-11-09 DIAGNOSIS — R07.9 CHEST PAIN, UNSPECIFIED TYPE: Primary | ICD-10-CM

## 2023-11-09 DIAGNOSIS — E78.5 HYPERLIPIDEMIA, UNSPECIFIED HYPERLIPIDEMIA TYPE: ICD-10-CM

## 2023-11-09 DIAGNOSIS — I47.10 SVT (SUPRAVENTRICULAR TACHYCARDIA): ICD-10-CM

## 2023-11-13 ENCOUNTER — HOSPITAL ENCOUNTER (OUTPATIENT)
Dept: GENERAL RADIOLOGY | Age: 58
Discharge: HOME | End: 2023-11-15
Payer: COMMERCIAL

## 2023-11-13 ENCOUNTER — OFFICE VISIT (OUTPATIENT)
Dept: CARDIOLOGY CLINIC | Age: 58
End: 2023-11-13

## 2023-11-13 ENCOUNTER — HOSPITAL ENCOUNTER (OUTPATIENT)
Age: 58
Discharge: HOME | End: 2023-11-15
Payer: COMMERCIAL

## 2023-11-13 ENCOUNTER — HOSPITAL ENCOUNTER (OUTPATIENT)
Age: 58
Discharge: HOME | End: 2023-11-13
Payer: COMMERCIAL

## 2023-11-13 VITALS
SYSTOLIC BLOOD PRESSURE: 120 MMHG | OXYGEN SATURATION: 94 % | WEIGHT: 293 LBS | BODY MASS INDEX: 45.2 KG/M2 | HEART RATE: 80 BPM | DIASTOLIC BLOOD PRESSURE: 70 MMHG

## 2023-11-13 DIAGNOSIS — R07.9 CHEST PAIN, UNSPECIFIED TYPE: Primary | ICD-10-CM

## 2023-11-13 DIAGNOSIS — R07.9 CHEST PAIN, UNSPECIFIED TYPE: ICD-10-CM

## 2023-11-13 DIAGNOSIS — E78.5 HYPERLIPIDEMIA, UNSPECIFIED HYPERLIPIDEMIA TYPE: ICD-10-CM

## 2023-11-13 DIAGNOSIS — I47.10 SVT (SUPRAVENTRICULAR TACHYCARDIA): ICD-10-CM

## 2023-11-13 DIAGNOSIS — R06.02 SOB (SHORTNESS OF BREATH): ICD-10-CM

## 2023-11-13 LAB
25(OH)D3 SERPL-MCNC: 16.2 NG/ML (ref 30–100)
ALBUMIN SERPL-MCNC: 4.2 G/DL (ref 3.5–5.2)
ALP SERPL-CCNC: 123 U/L (ref 35–104)
ALT SERPL-CCNC: 28 U/L (ref 5–33)
ANION GAP SERPL CALCULATED.3IONS-SCNC: 9 MMOL/L (ref 9–17)
AST SERPL-CCNC: 17 U/L
BASOPHILS # BLD: 0.04 K/UL (ref 0–0.2)
BASOPHILS NFR BLD: 1 % (ref 0–2)
BILIRUB SERPL-MCNC: 0.9 MG/DL (ref 0.3–1.2)
BUN SERPL-MCNC: 14 MG/DL (ref 6–20)
BUN/CREAT SERPL: 20 (ref 9–20)
CALCIUM SERPL-MCNC: 9.9 MG/DL (ref 8.6–10.4)
CHLORIDE SERPL-SCNC: 102 MMOL/L (ref 98–107)
CHOLEST SERPL-MCNC: 116 MG/DL (ref 0–199)
CHOLESTEROL/HDL RATIO: 4
CO2 SERPL-SCNC: 26 MMOL/L (ref 20–31)
CREAT SERPL-MCNC: 0.7 MG/DL (ref 0.5–0.9)
EOSINOPHIL # BLD: 0.25 K/UL (ref 0–0.4)
EOSINOPHILS RELATIVE PERCENT: 3 % (ref 0–5)
ERYTHROCYTE [DISTWIDTH] IN BLOOD BY AUTOMATED COUNT: 13.1 % (ref 12.1–15.2)
GFR SERPL CREATININE-BSD FRML MDRD: >60 ML/MIN/1.73M2
GLUCOSE SERPL-MCNC: 107 MG/DL (ref 70–99)
HCT VFR BLD AUTO: 39.4 % (ref 36–46)
HDLC SERPL-MCNC: 32 MG/DL
HGB BLD-MCNC: 12.8 G/DL (ref 12–16)
IMM GRANULOCYTES # BLD AUTO: 0.01 K/UL (ref 0–0.3)
IMM GRANULOCYTES NFR BLD: 0 % (ref 0–5)
LDLC SERPL CALC-MCNC: 53 MG/DL (ref 0–100)
LYMPHOCYTES NFR BLD: 1.76 K/UL (ref 1–4.8)
LYMPHOCYTES RELATIVE PERCENT: 22 % (ref 15–40)
MAGNESIUM SERPL-MCNC: 2.1 MG/DL (ref 1.6–2.6)
MCH RBC QN AUTO: 28.5 PG (ref 26–34)
MCHC RBC AUTO-ENTMCNC: 32.5 G/DL (ref 31–37)
MCV RBC AUTO: 87.8 FL (ref 80–100)
MONOCYTES NFR BLD: 0.61 K/UL (ref 0–1)
MONOCYTES NFR BLD: 8 % (ref 4–8)
NEUTROPHILS NFR BLD: 67 % (ref 47–75)
NEUTS SEG NFR BLD: 5.34 K/UL (ref 2.5–7)
PLATELET # BLD AUTO: 321 K/UL (ref 140–450)
PMV BLD AUTO: 10.4 FL (ref 6–12)
POTASSIUM SERPL-SCNC: 4.1 MMOL/L (ref 3.7–5.3)
PROT SERPL-MCNC: 6.9 G/DL (ref 6.4–8.3)
RBC # BLD AUTO: 4.49 M/UL (ref 4–5.2)
SODIUM SERPL-SCNC: 137 MMOL/L (ref 135–144)
TRIGL SERPL-MCNC: 156 MG/DL (ref 0–149)
TSH SERPL DL<=0.05 MIU/L-ACNC: 1.78 UIU/ML (ref 0.3–5)
VLDLC SERPL CALC-MCNC: 31 MG/DL
WBC OTHER # BLD: 8 K/UL (ref 3.5–11)

## 2023-11-13 PROCEDURE — 84443 ASSAY THYROID STIM HORMONE: CPT

## 2023-11-13 PROCEDURE — 80061 LIPID PANEL: CPT

## 2023-11-13 PROCEDURE — 82306 VITAMIN D 25 HYDROXY: CPT

## 2023-11-13 PROCEDURE — 93005 ELECTROCARDIOGRAM TRACING: CPT

## 2023-11-13 PROCEDURE — 71046 X-RAY EXAM CHEST 2 VIEWS: CPT

## 2023-11-13 PROCEDURE — 83735 ASSAY OF MAGNESIUM: CPT

## 2023-11-13 PROCEDURE — 85025 COMPLETE CBC W/AUTO DIFF WBC: CPT

## 2023-11-13 PROCEDURE — 80053 COMPREHEN METABOLIC PANEL: CPT

## 2023-11-13 PROCEDURE — 36415 COLL VENOUS BLD VENIPUNCTURE: CPT

## 2023-11-13 RX ORDER — LISINOPRIL 5 MG/1
5 TABLET ORAL 2 TIMES DAILY
COMMUNITY
End: 2023-11-13 | Stop reason: SDUPTHER

## 2023-11-13 RX ORDER — LISINOPRIL 5 MG/1
5 TABLET ORAL 2 TIMES DAILY
Qty: 60 TABLET | Refills: 11 | Status: SHIPPED | OUTPATIENT
Start: 2023-11-13

## 2023-11-13 NOTE — PROGRESS NOTES
Ov Dr. Linda Vega for follow up   In Cardiac Rehab - not doing   Much for her per tech   C/o still sob   Legs still achy and tired fast   Sob worse with walking   Not worse than before   \"Just not getting better\"   No chest pain or heaviness   No dizziness   No new sx    Will STOP Amlodipine (Norvasc )    Will HOLD Lipitor (atorvastatin)    Will STOP  Lopressor (Metoprolol)    Will ADD Lisinopril 5 mg one tablet twice a day     Will set up for PFT     Monitor BP/HR daily - different times of the   Day-     Will get CXR today     Will follow up in 2 weeks

## 2023-11-15 LAB
EKG ATRIAL RATE: 59 BPM
EKG P AXIS: 60 DEGREES
EKG P-R INTERVAL: 198 MS
EKG Q-T INTERVAL: 454 MS
EKG QRS DURATION: 98 MS
EKG QTC CALCULATION (BAZETT): 449 MS
EKG R AXIS: 5 DEGREES
EKG T AXIS: 37 DEGREES
EKG VENTRICULAR RATE: 59 BPM

## 2023-11-15 PROCEDURE — 93010 ELECTROCARDIOGRAM REPORT: CPT | Performed by: INTERNAL MEDICINE

## 2023-11-16 ENCOUNTER — HOSPITAL ENCOUNTER (OUTPATIENT)
Dept: PULMONOLOGY | Age: 58
Discharge: HOME | End: 2023-11-16
Attending: INTERNAL MEDICINE
Payer: COMMERCIAL

## 2023-11-16 VITALS — OXYGEN SATURATION: 98 %

## 2023-11-16 DIAGNOSIS — R06.02 SOB (SHORTNESS OF BREATH): ICD-10-CM

## 2023-11-16 DIAGNOSIS — R07.9 CHEST PAIN, UNSPECIFIED TYPE: ICD-10-CM

## 2023-11-16 PROCEDURE — 94060 EVALUATION OF WHEEZING: CPT

## 2023-11-16 PROCEDURE — 94729 DIFFUSING CAPACITY: CPT

## 2023-11-16 PROCEDURE — 6360000002 HC RX W HCPCS: Performed by: INTERNAL MEDICINE

## 2023-11-16 PROCEDURE — 94726 PLETHYSMOGRAPHY LUNG VOLUMES: CPT

## 2023-11-16 RX ORDER — ALBUTEROL SULFATE 2.5 MG/3ML
2.5 SOLUTION RESPIRATORY (INHALATION) ONCE
Status: COMPLETED | OUTPATIENT
Start: 2023-11-16 | End: 2023-11-16

## 2023-11-16 RX ADMIN — ALBUTEROL SULFATE 2.5 MG: 2.5 SOLUTION RESPIRATORY (INHALATION) at 13:46

## 2023-11-17 NOTE — PROCEDURES
[address] [place] [address],[address]                            [address] [place] [address]                               PULMONARY FUNCTION    PATIENT NAME: Pippa Mora                     :        1965  MED REC NO:   [id number]                              ROOM:  ACCOUNT NO:   [de-identified]                           ADMIT DATE: 2023  PROVIDER:     Ricky Beard MD    DATE OF PROCEDURE:  2023    PULMONARY FUNCTION TESTS WITH BRONCHODILATOR AND DLCO    ATTENDING PHYSICIAN:  More Garrison MD    PRIMARY CARE PROVIDER:  Dr. Mary Laws. REASON FOR TEST:  Shortness of breath. SUMMARY:  1. The patient's effort was reported as good. 2.  The forced vital capacity is mildly decreased at 77% of predicted. 3.  The forced exhaled volume in 1 second is mildly decreased at 78% of  predicted. 4.  The forced exhaled volume in 1 second/forced vital capacity is  normal at 99% of predicted. 5.  The forced exhaled flow at 25% to 75% is decreased at 80% of  predicted. 6.  A significant improvement was seen only in the forced exhaled flow  at 25% to 75% after one-time dose of bronchodilator. 7.  Total lung capacity is normal at 107% of predicted. 8.  Residual volume/total lung capacity is increased at 127% of  predicted. 9.  The DLCO is decreased at 68% of predicted, the DLCO/VA is decreased  at 72% of predicted. IMPRESSION:  1. Relatively normal prebronchodilator pulmonary function test and flow  volume loop. 2.  An improvement was seen only in the forced exhaled flow at 25% to  75% after one-time dose of bronchodilator to suggest a possible  reversible small airway disease (asthma), please correlate clinically. 3.  An increased residual volume/total lung capacity suggests air  trapping. 4.  The DLCO is mildly decreased.         Ricky Beard MD    D: 2023 6:53:22       T: 2023 9:33:23     [id number]  Job#: [id number]     Doc#:

## 2023-11-21 ENCOUNTER — TELEPHONE (OUTPATIENT)
Dept: CARDIOLOGY CLINIC | Age: 58
End: 2023-11-21

## 2023-11-28 ENCOUNTER — OFFICE VISIT (OUTPATIENT)
Dept: CARDIOLOGY CLINIC | Age: 58
End: 2023-11-28

## 2023-11-28 VITALS
WEIGHT: 293 LBS | SYSTOLIC BLOOD PRESSURE: 120 MMHG | DIASTOLIC BLOOD PRESSURE: 70 MMHG | HEART RATE: 83 BPM | BODY MASS INDEX: 45.05 KG/M2 | OXYGEN SATURATION: 92 %

## 2023-11-28 DIAGNOSIS — I47.10 SVT (SUPRAVENTRICULAR TACHYCARDIA): Primary | ICD-10-CM

## 2023-11-28 RX ORDER — ALBUTEROL SULFATE 90 UG/1
2 AEROSOL, METERED RESPIRATORY (INHALATION) 2 TIMES DAILY PRN
Qty: 1 EACH | Refills: 3 | Status: SHIPPED | OUTPATIENT
Start: 2023-11-28

## 2023-11-28 NOTE — PATIENT INSTRUCTIONS
HOLD FLECAINIDE x 2 WEEKS    WILL GIVE ALBUTEROL INHALER   TO USE PRIOR TO GOING TO BARN    USE MIRALAX DAILY -ONE CAP DAILY     AND/OR MILK OF MG     KEEP DEC APPT

## 2023-11-28 NOTE — PROGRESS NOTES
Feeling little better  Able to walk to barn without   As much sob   Feels little better  Teachers even saying   She looks better        HOLD FLECAINIDE x 2 WEEKS    WILL GIVE ALBUTEROL INHALER   TO USE PRIOR TO GOING TO BARN    USE MIRALAX DAILY     AND/OR MILK OF MG     KEEP DEC APPT

## 2023-12-02 DIAGNOSIS — M79.671 RIGHT FOOT PAIN: ICD-10-CM

## 2023-12-04 RX ORDER — MELOXICAM 15 MG/1
15 TABLET ORAL DAILY
Qty: 30 TABLET | Refills: 5 | Status: SHIPPED | OUTPATIENT
Start: 2023-12-04

## 2023-12-04 NOTE — TELEPHONE ENCOUNTER
Last OV: 7/12/2023   physical   Last RX:    Next scheduled apt: Visit date not found            Surescript requesting a refill

## 2023-12-12 ENCOUNTER — TELEPHONE (OUTPATIENT)
Dept: CARDIOLOGY CLINIC | Age: 58
End: 2023-12-12

## 2023-12-12 RX ORDER — FLECAINIDE ACETATE 150 MG/1
150 TABLET ORAL 2 TIMES DAILY
Qty: 180 TABLET | Refills: 3 | Status: SHIPPED | OUTPATIENT
Start: 2023-12-12

## 2023-12-12 NOTE — TELEPHONE ENCOUNTER
Pt called after dr shane Graciat report   Does not feel any different off of the   Flecainide so dr would like pt to   Restart flecainide.    Pt informed

## 2023-12-13 ENCOUNTER — HOSPITAL ENCOUNTER (OUTPATIENT)
Age: 58
Setting detail: SPECIMEN
Discharge: HOME | End: 2023-12-13
Payer: COMMERCIAL

## 2023-12-13 ENCOUNTER — OFFICE VISIT (OUTPATIENT)
Dept: FAMILY MEDICINE CLINIC | Age: 58
End: 2023-12-13
Payer: COMMERCIAL

## 2023-12-13 VITALS
WEIGHT: 293 LBS | HEART RATE: 75 BPM | OXYGEN SATURATION: 97 % | BODY MASS INDEX: 45.05 KG/M2 | SYSTOLIC BLOOD PRESSURE: 132 MMHG | DIASTOLIC BLOOD PRESSURE: 72 MMHG

## 2023-12-13 DIAGNOSIS — R35.0 URINARY FREQUENCY: ICD-10-CM

## 2023-12-13 DIAGNOSIS — N30.00 ACUTE CYSTITIS WITHOUT HEMATURIA: ICD-10-CM

## 2023-12-13 DIAGNOSIS — R35.0 URINARY FREQUENCY: Primary | ICD-10-CM

## 2023-12-13 LAB
BILIRUBIN, POC: NEGATIVE
BLOOD URINE, POC: NEGATIVE
CLARITY, POC: CLEAR
COLOR, POC: NORMAL
GLUCOSE URINE, POC: NEGATIVE
KETONES, POC: NEGATIVE
LEUKOCYTE EST, POC: NORMAL
NITRITE, POC: POSITIVE
PH, POC: 5.5
PROTEIN, POC: NEGATIVE
SPECIFIC GRAVITY, POC: 1.02
UROBILINOGEN, POC: 1

## 2023-12-13 PROCEDURE — 81003 URINALYSIS AUTO W/O SCOPE: CPT | Performed by: STUDENT IN AN ORGANIZED HEALTH CARE EDUCATION/TRAINING PROGRAM

## 2023-12-13 PROCEDURE — 87086 URINE CULTURE/COLONY COUNT: CPT

## 2023-12-13 PROCEDURE — 87077 CULTURE AEROBIC IDENTIFY: CPT

## 2023-12-13 PROCEDURE — 99213 OFFICE O/P EST LOW 20 MIN: CPT | Performed by: STUDENT IN AN ORGANIZED HEALTH CARE EDUCATION/TRAINING PROGRAM

## 2023-12-13 PROCEDURE — 87186 SC STD MICRODIL/AGAR DIL: CPT

## 2023-12-13 RX ORDER — CEPHALEXIN 500 MG/1
500 CAPSULE ORAL 2 TIMES DAILY
Qty: 14 CAPSULE | Refills: 0 | Status: SHIPPED | OUTPATIENT
Start: 2023-12-13 | End: 2023-12-20

## 2023-12-13 ASSESSMENT — ENCOUNTER SYMPTOMS
DIARRHEA: 0
COUGH: 0
RHINORRHEA: 0
BACK PAIN: 0
ABDOMINAL PAIN: 0
WHEEZING: 0
SINUS PAIN: 0
VOMITING: 0
NAUSEA: 0

## 2023-12-13 NOTE — PATIENT INSTRUCTIONS
SURVEY:    You may be receiving a survey from [employer] regarding your visit today. You may get this in the mail, through your MyChart or in your email. Please complete the survey to enable us to provide the highest quality of care to you and your family. If you cannot score us as very good ( 5 Stars) on any question, please feel free to call the office to discuss how we could have made your experience exceptional.     Thank you.     Clinical Care Team:  DO Armando Kessler LPN    Triage:  Mari Bradley, [address] [facility] Team:  Felecia Xiong

## 2023-12-13 NOTE — PROGRESS NOTES
HPI Notes    Name: Ronel Barrientos  : 1965         Chief Complaint:     Chief Complaint   Patient presents with    Urinary Tract Infection     Patient has hx of frequent UTI's. Low back pain, discomfort upon urination, and urinary frequency started 1 day. History of Present Illness:        HPI    This is a 4year-old woman presenting for concern of a urinary tract infection. She has a history of frequent urinary tract infections and is now endorsing low back pain, dysuria, urinary frequency since yesterday. She has been taking Azo to help improve symptoms.     Past Medical History:     Past Medical History:   Diagnosis Date    Bell's palsy 2013    Carpal tunnel syndrome     Chicken pox     Hydronephrosis 2009    left     Hyperlipidemia 2013    Myalgia 2022    Pneumonia     Renal atrophy     Sleep apnea     SVT (supraventricular tachycardia) 2022    UTI (urinary tract infection)       Reviewed all health maintenance requirements and ordered appropriate tests  Health Maintenance Due   Topic Date Due    Hepatitis B vaccine (1 of 3 - 3-dose series) Never done    HIV screen  Never done    DTaP/Tdap/Td vaccine (1 - Tdap) Never done    Shingles vaccine (1 of 2) Never done    Flu vaccine (1) 2023    COVID-19 Vaccine (3 - -24 season) 2023       Past Surgical History:     Past Surgical History:   Procedure Laterality Date     SECTION      COLONOSCOPY N/A 2021    COLONOSCOPY performed by Fracisco Juarez MD at [address] [place] [address]      CYSTOSCOPY Left 2022    CYSTOSCOPY performed by Ramin Leonard MD at [facility] Left 2022    CYSTOSCOPY URETERAL STENT INSERTION performed by Ramin Leonard MD at [address] [place] [facility] [place], [address])      ROTATOR CUFF REPAIR Left 2017    URETER SURGERY Left 2022    URETEROSCOPY performed by Ramin Leonard MD at [facility]        Medications:       Prior to

## 2023-12-15 LAB
MICROORGANISM SPEC CULT: ABNORMAL
SPECIMEN DESCRIPTION: ABNORMAL

## 2023-12-27 RX ORDER — CIPROFLOXACIN 500 MG/1
500 TABLET, FILM COATED ORAL 2 TIMES DAILY
Qty: 14 TABLET | Refills: 0 | Status: SHIPPED | OUTPATIENT
Start: 2023-12-27 | End: 2024-01-03

## 2023-12-27 NOTE — TELEPHONE ENCOUNTER
Last OV: 12/13/2023    Next scheduled apt: Visit date not found        Patient was prescribed Keflex to treat UTI. Symptoms have not improved. Patient did state that Keflex has never worked for her Uti's.        Patient requesting Prescription for Cipro  Medication pending

## 2023-12-28 RX ORDER — ALBUTEROL SULFATE 90 UG/1
2 AEROSOL, METERED RESPIRATORY (INHALATION) 2 TIMES DAILY PRN
Qty: 1 EACH | Refills: 3 | Status: SHIPPED | OUTPATIENT
Start: 2023-12-28

## 2024-01-02 ENCOUNTER — TELEPHONE (OUTPATIENT)
Dept: FAMILY MEDICINE CLINIC | Age: 59
End: 2024-01-02

## 2024-01-02 DIAGNOSIS — R35.0 URINE FREQUENCY: Primary | ICD-10-CM

## 2024-01-02 NOTE — TELEPHONE ENCOUNTER
Come in tomorrow and go register and then go to lab to give urine sample ---- pend a UA and urine culture.

## 2024-01-02 NOTE — TELEPHONE ENCOUNTER
Patient states she has 1 antibiotic left and she feels like she still has an Urinary tract infection.  She states she is still having pain with urination and just feels uncomfortable.  Patient is wondering what she should do next?  Please advise.    Health Maintenance   Topic Date Due    Hepatitis B vaccine (1 of 3 - 3-dose series) Never done    HIV screen  Never done    DTaP/Tdap/Td vaccine (1 - Tdap) Never done    Shingles vaccine (1 of 2) Never done    Flu vaccine (1) 08/01/2023    COVID-19 Vaccine (3 - 2023-24 season) 09/01/2023    Depression Screen  06/07/2024    Breast cancer screen  08/30/2024    Diabetes screen  09/26/2025    Lipids  11/13/2028    Colorectal Cancer Screen  09/13/2031    Hepatitis C screen  Completed    Hepatitis A vaccine  Aged Out    Hib vaccine  Aged Out    Polio vaccine  Aged Out    Meningococcal (ACWY) vaccine  Aged Out    Pneumococcal 0-64 years Vaccine  Aged Out    A1C test (Diabetic or Prediabetic)  Discontinued             (applicable per patient's age: Cancer Screenings, Depression Screening, Fall Risk Screening, Immunizations)    Hemoglobin A1C (%)   Date Value   09/26/2022 5.6   07/29/2021 6.0   09/12/2018 5.7     LDL Cholesterol (mg/dL)   Date Value   11/13/2023 53     LDL Calculated (mg/dL)   Date Value   02/03/2014 92     AST (U/L)   Date Value   11/13/2023 17     ALT (U/L)   Date Value   11/13/2023 28     BUN (mg/dL)   Date Value   11/13/2023 14      (goal A1C is < 7)   (goal LDL is <100) need 30-50% reduction from baseline     BP Readings from Last 3 Encounters:   12/21/23 (!) 176/85   12/13/23 132/72   11/28/23 120/70    (goal /80)      All Future Testing planned in [facility]:  Lab Frequency Next Occurrence   Full PFT Study With Bronchodilator Once 11/13/2023   CBC with Auto Differential Once 06/21/2024   Comprehensive Metabolic Panel Once 06/21/2024   Lipid Panel Once 06/21/2024   EKG 12 Lead Once 06/21/2024       Next Visit Date:  Future Appointments   Date Time

## 2024-01-03 ENCOUNTER — HOSPITAL ENCOUNTER (OUTPATIENT)
Age: 59
Discharge: HOME | End: 2024-01-03
Payer: COMMERCIAL

## 2024-01-03 DIAGNOSIS — R35.0 URINE FREQUENCY: ICD-10-CM

## 2024-01-03 LAB
-: ABNORMAL
BACTERIA URNS QL MICRO: ABNORMAL
BILIRUB UR QL STRIP: NEGATIVE
CLARITY UR: ABNORMAL
COLOR UR: YELLOW
COMMENT: ABNORMAL
EPI CELLS #/AREA URNS HPF: ABNORMAL /HPF
GLUCOSE UR STRIP-MCNC: NEGATIVE MG/DL
HGB UR QL STRIP.AUTO: NEGATIVE
KETONES UR STRIP-MCNC: NEGATIVE MG/DL
LEUKOCYTE ESTERASE UR QL STRIP: ABNORMAL
NITRITE UR QL STRIP: NEGATIVE
PH UR STRIP: 5 [PH] (ref 5–8)
PROT UR STRIP-MCNC: ABNORMAL MG/DL
RBC #/AREA URNS HPF: ABNORMAL /HPF (ref 0–2)
SP GR UR STRIP: 1.02 (ref 1–1.03)
UROBILINOGEN UR STRIP-ACNC: NORMAL EU/DL (ref 0–1)
WBC #/AREA URNS HPF: ABNORMAL /HPF

## 2024-01-03 PROCEDURE — 87077 CULTURE AEROBIC IDENTIFY: CPT

## 2024-01-03 PROCEDURE — 87086 URINE CULTURE/COLONY COUNT: CPT

## 2024-01-03 PROCEDURE — 81001 URINALYSIS AUTO W/SCOPE: CPT

## 2024-01-03 PROCEDURE — 87186 SC STD MICRODIL/AGAR DIL: CPT

## 2024-01-04 ENCOUNTER — TELEPHONE (OUTPATIENT)
Dept: FAMILY MEDICINE CLINIC | Age: 59
End: 2024-01-04

## 2024-01-04 DIAGNOSIS — N30.00 ACUTE CYSTITIS WITHOUT HEMATURIA: Primary | ICD-10-CM

## 2024-01-04 RX ORDER — CIPROFLOXACIN 500 MG/1
500 TABLET, FILM COATED ORAL 2 TIMES DAILY
Qty: 10 TABLET | Refills: 0 | Status: SHIPPED | OUTPATIENT
Start: 2024-01-04 | End: 2024-01-09

## 2024-01-04 RX ORDER — CIPROFLOXACIN 500 MG/1
500 TABLET, FILM COATED ORAL 2 TIMES DAILY
Qty: 14 TABLET | Refills: 0 | Status: CANCELLED | OUTPATIENT
Start: 2024-01-04 | End: 2024-01-11

## 2024-01-04 NOTE — TELEPHONE ENCOUNTER
----- Message from Obinna Elias DO sent at 1/4/2024 10:50 AM EST -----  Urine is growing gram-negative rods, it is probably E. coli.  Continue Keflex, will let her know if she needs to change it based on the sensitivity.

## 2024-01-05 DIAGNOSIS — N30.00 ACUTE CYSTITIS WITHOUT HEMATURIA: Primary | ICD-10-CM

## 2024-01-05 LAB
MICROORGANISM SPEC CULT: ABNORMAL
SPECIMEN DESCRIPTION: ABNORMAL

## 2024-01-05 RX ORDER — NITROFURANTOIN 25; 75 MG/1; MG/1
100 CAPSULE ORAL 2 TIMES DAILY
Qty: 20 CAPSULE | Refills: 0 | Status: SHIPPED | OUTPATIENT
Start: 2024-01-05 | End: 2024-01-15

## 2024-01-15 ENCOUNTER — TELEPHONE (OUTPATIENT)
Dept: FAMILY MEDICINE CLINIC | Age: 59
End: 2024-01-15

## 2024-01-15 DIAGNOSIS — N30.00 ACUTE CYSTITIS WITHOUT HEMATURIA: Primary | ICD-10-CM

## 2024-01-15 NOTE — TELEPHONE ENCOUNTER
Last OV: 12/13/2023    Next scheduled apt: Visit date not found    Patient called in with update. Stating she finished the antibiotic but still experiencing cramping and burning upon urination.    Appointment with Urologist is scheduled for Feb 18  Patient requesting medication to manage symptoms until she sees Urology

## 2024-01-16 RX ORDER — NITROFURANTOIN 25; 75 MG/1; MG/1
100 CAPSULE ORAL 2 TIMES DAILY
Qty: 20 CAPSULE | Refills: 0 | Status: SHIPPED | OUTPATIENT
Start: 2024-01-16 | End: 2024-01-26

## 2024-01-22 ENCOUNTER — HOSPITAL ENCOUNTER (OUTPATIENT)
Age: 59
Setting detail: SPECIMEN
Discharge: HOME | End: 2024-01-22
Payer: COMMERCIAL

## 2024-01-22 ENCOUNTER — OFFICE VISIT (OUTPATIENT)
Dept: FAMILY MEDICINE CLINIC | Age: 59
End: 2024-01-22
Payer: COMMERCIAL

## 2024-01-22 VITALS
DIASTOLIC BLOOD PRESSURE: 72 MMHG | BODY MASS INDEX: 46.2 KG/M2 | SYSTOLIC BLOOD PRESSURE: 122 MMHG | OXYGEN SATURATION: 98 % | TEMPERATURE: 97.5 F | WEIGHT: 293 LBS | HEART RATE: 80 BPM

## 2024-01-22 DIAGNOSIS — R35.0 URINE FREQUENCY: Primary | ICD-10-CM

## 2024-01-22 DIAGNOSIS — R35.0 URINE FREQUENCY: ICD-10-CM

## 2024-01-22 DIAGNOSIS — R30.0 DYSURIA: ICD-10-CM

## 2024-01-22 LAB
BILIRUBIN, POC: ABNORMAL
BLOOD URINE, POC: ABNORMAL
CLARITY, POC: CLEAR
COLOR, POC: ABNORMAL
GLUCOSE URINE, POC: ABNORMAL
KETONES, POC: ABNORMAL
LEUKOCYTE EST, POC: ABNORMAL
NITRITE, POC: ABNORMAL
PH, POC: 5.5
PROTEIN, POC: ABNORMAL
SPECIFIC GRAVITY, POC: 1.03
UROBILINOGEN, POC: 0.2

## 2024-01-22 PROCEDURE — 87086 URINE CULTURE/COLONY COUNT: CPT

## 2024-01-22 PROCEDURE — 81002 URINALYSIS NONAUTO W/O SCOPE: CPT | Performed by: FAMILY MEDICINE

## 2024-01-22 PROCEDURE — 99213 OFFICE O/P EST LOW 20 MIN: CPT | Performed by: FAMILY MEDICINE

## 2024-01-22 ASSESSMENT — PATIENT HEALTH QUESTIONNAIRE - PHQ9
SUM OF ALL RESPONSES TO PHQ QUESTIONS 1-9: 0
SUM OF ALL RESPONSES TO PHQ9 QUESTIONS 1 & 2: 0
1. LITTLE INTEREST OR PLEASURE IN DOING THINGS: 0
2. FEELING DOWN, DEPRESSED OR HOPELESS: 0
SUM OF ALL RESPONSES TO PHQ QUESTIONS 1-9: 0

## 2024-01-22 ASSESSMENT — ENCOUNTER SYMPTOMS
COUGH: 0
BLOOD IN STOOL: 0
SHORTNESS OF BREATH: 0
DIARRHEA: 0
NAUSEA: 1
ABDOMINAL PAIN: 0

## 2024-01-22 NOTE — PROGRESS NOTES
HPI Notes    Name: Sara Miller  : 1965        Chief Complaint:     Chief Complaint   Patient presents with    Urinary Tract Infection     Burning, urgency, urinary frequency x 2 months. She has been treated with a couple different antibiotics since beginning of . She is currently on macrobid. She does have OV with urologist at [facility] on 24. She is having heart stent on 24.       History of Present Illness:     Sara Miller is a 58 y.o.  female who presents with Urinary Tract Infection (Burning, urgency, urinary frequency x 2 months. She has been treated with a couple different antibiotics since beginning of . She is currently on macrobid. She does have OV with urologist at [facility] on 24. She is having heart stent on 24.)      Urinary Tract Infection  This is a recurrent problem. The current episode started more than 1 month ago (pt still having some frequency of urination and burning. Pt was on the cephlexin and cipro and then the last urine showed those resistent.). The problem has been gradually improving since onset. Associated symptoms include pain. Pertinent negatives include no hematuria.   Pt's last UA and culture from early January show pt susceptible to macrobid and pt has take for like 15 or so days. Pt has appt 24 to see urology but having heart stent 24                                                                                                                 Past Medical History:     Past Medical History:   Diagnosis Date    Bell's palsy 2013    Carpal tunnel syndrome     Chicken pox     Hydronephrosis 2009    left     Hyperlipidemia 2013    Myalgia 2022    Pneumonia     Renal atrophy     Sleep apnea     SVT (supraventricular tachycardia) 2022    UTI (urinary tract infection)       Reviewed all health maintenance requirements and ordered appropriate tests  Health Maintenance Due   Topic Date Due

## 2024-01-23 LAB
MICROORGANISM SPEC CULT: NORMAL
SPECIMEN DESCRIPTION: NORMAL

## 2024-02-07 ENCOUNTER — HOSPITAL ENCOUNTER (OUTPATIENT)
Age: 59
Discharge: HOME | End: 2024-02-07
Payer: COMMERCIAL

## 2024-02-07 DIAGNOSIS — E55.9 VITAMIN D DEFICIENCY DISEASE: ICD-10-CM

## 2024-02-07 DIAGNOSIS — E78.5 HYPERLIPIDEMIA, UNSPECIFIED HYPERLIPIDEMIA TYPE: ICD-10-CM

## 2024-02-07 DIAGNOSIS — I47.10 SVT (SUPRAVENTRICULAR TACHYCARDIA): ICD-10-CM

## 2024-02-07 LAB
ALBUMIN SERPL-MCNC: 4.3 G/DL (ref 3.5–5.2)
ALP SERPL-CCNC: 96 U/L (ref 35–104)
ALT SERPL-CCNC: 28 U/L (ref 5–33)
ANION GAP SERPL CALCULATED.3IONS-SCNC: 10 MMOL/L (ref 9–17)
AST SERPL-CCNC: 14 U/L
BASOPHILS # BLD: 0.05 K/UL (ref 0–0.2)
BASOPHILS NFR BLD: 1 % (ref 0–2)
BILIRUB SERPL-MCNC: 0.5 MG/DL (ref 0.3–1.2)
BUN SERPL-MCNC: 17 MG/DL (ref 6–20)
BUN/CREAT SERPL: 24 (ref 9–20)
CALCIUM SERPL-MCNC: 9.4 MG/DL (ref 8.6–10.4)
CHLORIDE SERPL-SCNC: 105 MMOL/L (ref 98–107)
CHOLEST SERPL-MCNC: 227 MG/DL (ref 0–199)
CHOLESTEROL/HDL RATIO: 7
CO2 SERPL-SCNC: 22 MMOL/L (ref 20–31)
CREAT SERPL-MCNC: 0.7 MG/DL (ref 0.5–0.9)
EOSINOPHIL # BLD: 0.2 K/UL (ref 0–0.4)
EOSINOPHILS RELATIVE PERCENT: 3 % (ref 0–5)
ERYTHROCYTE [DISTWIDTH] IN BLOOD BY AUTOMATED COUNT: 13.6 % (ref 12.1–15.2)
GFR SERPL CREATININE-BSD FRML MDRD: >60 ML/MIN/1.73M2
GLUCOSE SERPL-MCNC: 107 MG/DL (ref 70–99)
HCT VFR BLD AUTO: 40.3 % (ref 36–46)
HDLC SERPL-MCNC: 34 MG/DL
HGB BLD-MCNC: 13.1 G/DL (ref 12–16)
IMM GRANULOCYTES # BLD AUTO: 0.03 K/UL (ref 0–0.3)
IMM GRANULOCYTES NFR BLD: 1 % (ref 0–5)
LDLC SERPL CALC-MCNC: 157 MG/DL (ref 0–100)
LYMPHOCYTES NFR BLD: 1.78 K/UL (ref 1–4.8)
LYMPHOCYTES RELATIVE PERCENT: 27 % (ref 15–40)
MCH RBC QN AUTO: 29.1 PG (ref 26–34)
MCHC RBC AUTO-ENTMCNC: 32.5 G/DL (ref 31–37)
MCV RBC AUTO: 89.6 FL (ref 80–100)
MONOCYTES NFR BLD: 0.59 K/UL (ref 0–1)
MONOCYTES NFR BLD: 9 % (ref 4–8)
NEUTROPHILS NFR BLD: 59 % (ref 47–75)
NEUTS SEG NFR BLD: 4.01 K/UL (ref 2.5–7)
PLATELET # BLD AUTO: 342 K/UL (ref 140–450)
PMV BLD AUTO: 10.8 FL (ref 6–12)
POTASSIUM SERPL-SCNC: 4.3 MMOL/L (ref 3.7–5.3)
PROT SERPL-MCNC: 6.7 G/DL (ref 6.4–8.3)
RBC # BLD AUTO: 4.5 M/UL (ref 4–5.2)
SODIUM SERPL-SCNC: 137 MMOL/L (ref 135–144)
TRIGL SERPL-MCNC: 183 MG/DL
VLDLC SERPL CALC-MCNC: 37 MG/DL
WBC OTHER # BLD: 6.7 K/UL (ref 3.5–11)

## 2024-02-07 PROCEDURE — 93005 ELECTROCARDIOGRAM TRACING: CPT

## 2024-02-07 PROCEDURE — 80053 COMPREHEN METABOLIC PANEL: CPT

## 2024-02-07 PROCEDURE — 85025 COMPLETE CBC W/AUTO DIFF WBC: CPT

## 2024-02-07 PROCEDURE — 36415 COLL VENOUS BLD VENIPUNCTURE: CPT

## 2024-02-07 PROCEDURE — 80061 LIPID PANEL: CPT

## 2024-02-08 LAB
EKG ATRIAL RATE: 69 BPM
EKG P AXIS: 26 DEGREES
EKG P-R INTERVAL: 182 MS
EKG Q-T INTERVAL: 402 MS
EKG QRS DURATION: 86 MS
EKG QTC CALCULATION (BAZETT): 430 MS
EKG R AXIS: -2 DEGREES
EKG T AXIS: 34 DEGREES
EKG VENTRICULAR RATE: 69 BPM

## 2024-02-09 ENCOUNTER — PROCEDURE VISIT (OUTPATIENT)
Dept: CARDIOLOGY CLINIC | Age: 59
End: 2024-02-09

## 2024-02-09 DIAGNOSIS — R93.89 ABNORMAL CXR: ICD-10-CM

## 2024-02-09 DIAGNOSIS — I20.9 ANGINA, CLASS III (HCC): ICD-10-CM

## 2024-02-09 DIAGNOSIS — R06.02 SOB (SHORTNESS OF BREATH): ICD-10-CM

## 2024-02-09 DIAGNOSIS — I47.10 SVT (SUPRAVENTRICULAR TACHYCARDIA): Primary | ICD-10-CM

## 2024-02-13 ENCOUNTER — TELEPHONE (OUTPATIENT)
Dept: CARDIOLOGY CLINIC | Age: 59
End: 2024-02-13

## 2024-02-13 DIAGNOSIS — Z98.61 POST PTCA: Primary | ICD-10-CM

## 2024-02-21 ENCOUNTER — HOSPITAL ENCOUNTER (OUTPATIENT)
Dept: HOSPITAL 101 - CR | Age: 59
Discharge: HOME | End: 2024-02-21
Payer: COMMERCIAL

## 2024-02-21 DIAGNOSIS — Z98.61: Primary | ICD-10-CM

## 2024-02-29 ENCOUNTER — TELEPHONE (OUTPATIENT)
Dept: CARDIOLOGY CLINIC | Age: 59
End: 2024-02-29

## 2024-02-29 NOTE — TELEPHONE ENCOUNTER
Jack from cardiac rehab in [place] left a message to state that Sara has been having low BP in the AM and he is wondering if she needs a medication adjustment.  He stated that her BP has been 90/50 during the mornings and this was also taken by NP at her work.  She feels lightheaded and faint.  She stated that this usually happens about 2 hours after taking her medications.      He stated that when she is in cardiac rehab with him in the afternoon, it is usually 150/80 and 160/90.      Raza left his number if needed  Ext [phone]    Please advise

## 2024-02-29 NOTE — TELEPHONE ENCOUNTER
Pt notified to take lisinopril 10 mg in AM  ( Instead of 5 mg am and pm)  Will update us next week

## 2024-03-05 ENCOUNTER — TELEPHONE (OUTPATIENT)
Dept: CARDIOLOGY CLINIC | Age: 59
End: 2024-03-05

## 2024-03-05 RX ORDER — LISINOPRIL 5 MG/1
5 TABLET ORAL DAILY
COMMUNITY

## 2024-03-05 NOTE — TELEPHONE ENCOUNTER
Pt called bp still little low  At 90/61  101/60  128/60 and 130/60-but that's in rehab exercising    Will DECREASE Lisinopril to 5 mg one daily

## 2024-04-01 RX ORDER — FLECAINIDE ACETATE 150 MG/1
150 TABLET ORAL 2 TIMES DAILY
Qty: 180 TABLET | Refills: 3 | Status: SHIPPED | OUTPATIENT
Start: 2024-04-01

## 2024-04-04 RX ORDER — LISINOPRIL 5 MG/1
5 TABLET ORAL DAILY
Qty: 30 TABLET | Refills: 1 | Status: SHIPPED | OUTPATIENT
Start: 2024-04-04

## 2024-04-04 NOTE — TELEPHONE ENCOUNTER
Last OV 12/21/23 OV, 2/9/24 Procedure     Next OV 4/11/24    Requesting refill on lisinopril.   Rx pending

## 2024-04-10 ENCOUNTER — HOSPITAL ENCOUNTER (OUTPATIENT)
Dept: HOSPITAL 101 - CR | Age: 59
LOS: 6 days | Discharge: HOME | End: 2024-04-16
Payer: COMMERCIAL

## 2024-04-10 DIAGNOSIS — Z98.61: Primary | ICD-10-CM

## 2024-04-10 PROCEDURE — 93798 PHYS/QHP OP CAR RHAB W/ECG: CPT

## 2024-04-11 ENCOUNTER — OFFICE VISIT (OUTPATIENT)
Dept: CARDIOLOGY CLINIC | Age: 59
End: 2024-04-11
Payer: COMMERCIAL

## 2024-04-11 DIAGNOSIS — I47.10 SVT (SUPRAVENTRICULAR TACHYCARDIA) (HCC): ICD-10-CM

## 2024-04-11 DIAGNOSIS — E55.9 VITAMIN D DEFICIENCY DISEASE: ICD-10-CM

## 2024-04-11 DIAGNOSIS — Z98.61 POST PTCA: Primary | ICD-10-CM

## 2024-04-11 DIAGNOSIS — R07.9 CHEST PAIN, UNSPECIFIED TYPE: ICD-10-CM

## 2024-04-11 DIAGNOSIS — R06.02 SOB (SHORTNESS OF BREATH): ICD-10-CM

## 2024-04-11 PROCEDURE — 99214 OFFICE O/P EST MOD 30 MIN: CPT | Performed by: INTERNAL MEDICINE

## 2024-04-17 NOTE — PROGRESS NOTES
Ov Dr. Aguayo for 2 mth f/u   Did not bring list of med  No chest pain   No palpitations   Sob is much better   Still in CR    No changes    Follow up in 6 months    
placing 3.0 x 28 mm drug-eluting Synergy stent.    She is doing good at this time.  Denies any chest pain or chest discomfort.  No palpitations.  Her shortness of breath is much improved.  She is still in cardiac rehab.  She really has no complaints as I see her today.    CARDIAC RISK FACTORS:  Known CAD:  Positive.  Hypertension:  Negative.  Peripheral vascular disease:  Negative.  Hyperlipidemia:  Positive.  Other family members:  Negative.  Smoking:  Negative.  Diabetes:  Negative.    MEDICATIONS:  At this time, she is on albuterol inhaler p.r.n., aspirin 81 mg daily, Plavix 75 mg daily, Inspra 50 mg b.i.d., Tambocor 150 mg b.i.d., lisinopril 5 mg daily, Mobic 15 mg daily, and Lopressor 25 mg b.i.d.    PAST MEDICAL AND SURGICAL HISTORY:    Cardiac as above.  Sleep apnea, on CPAP mask.  Bell's palsy on 2013.  Carpal tunnel syndrome.  Hydronephrosis on the left kidney in .  Hyperlipidemia since  in which she was initially intolerant to statins, although doing well at this time.  .  Hysterectomy .  Rotator cuff repair on the left in 2017.  SVT, well controlled on flecainide 150 mg b.i.d.  Laparoscopic partial left nephrectomy at [facility] on 2023, due to left ureteral obstruction, from which she has done well.    FAMILY HISTORY:  Mother had valve replacement.  Sister had atrial fibrillation.    SOCIAL HISTORY:  She is 58 years old,  to Alfred Miller.  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  Does not smoke or drink alcohol, stays active on the farm.    REVIEW OF SYSTEMS:  Cardiac as above.  Other systems reviewed including constitutional, eyes, ears, nose and throat, cardiovascular, respiratory, GI, , musculoskeletal, integumentary, neurologic are negative except as described above.  No weight loss or weight gain. No change in bowel habits.  No blood in stools.  No fevers, sweats, or chills.    PHYSICAL EXAMINATION:  VITAL

## 2024-04-29 RX ORDER — LISINOPRIL 5 MG/1
5 TABLET ORAL DAILY
Qty: 90 TABLET | Refills: 1 | Status: SHIPPED | OUTPATIENT
Start: 2024-04-29

## 2024-05-25 DIAGNOSIS — M79.671 RIGHT FOOT PAIN: ICD-10-CM

## 2024-05-28 RX ORDER — MELOXICAM 15 MG/1
15 TABLET ORAL DAILY
Qty: 30 TABLET | Refills: 5 | Status: SHIPPED | OUTPATIENT
Start: 2024-05-28

## 2024-07-02 ENCOUNTER — OFFICE VISIT (OUTPATIENT)
Dept: FAMILY MEDICINE CLINIC | Age: 59
End: 2024-07-02
Payer: COMMERCIAL

## 2024-07-02 VITALS
DIASTOLIC BLOOD PRESSURE: 60 MMHG | HEART RATE: 60 BPM | BODY MASS INDEX: 46.49 KG/M2 | OXYGEN SATURATION: 98 % | WEIGHT: 293 LBS | SYSTOLIC BLOOD PRESSURE: 112 MMHG

## 2024-07-02 DIAGNOSIS — M95.8 WINGED SCAPULA OF LEFT SIDE: ICD-10-CM

## 2024-07-02 DIAGNOSIS — R06.02 SOB (SHORTNESS OF BREATH) ON EXERTION: ICD-10-CM

## 2024-07-02 DIAGNOSIS — Z00.00 ANNUAL PHYSICAL EXAM: Primary | ICD-10-CM

## 2024-07-02 PROBLEM — I25.10 CORONARY ARTERIOSCLEROSIS IN NATIVE ARTERY: Status: ACTIVE | Noted: 2024-04-10

## 2024-07-02 PROCEDURE — 99396 PREV VISIT EST AGE 40-64: CPT | Performed by: FAMILY MEDICINE

## 2024-07-02 RX ORDER — FLUTICASONE FUROATE AND VILANTEROL 100; 25 UG/1; UG/1
1 POWDER RESPIRATORY (INHALATION) DAILY
Qty: 1 EACH | Refills: 5 | Status: SHIPPED | OUTPATIENT
Start: 2024-07-02

## 2024-07-02 SDOH — ECONOMIC STABILITY: INCOME INSECURITY: HOW HARD IS IT FOR YOU TO PAY FOR THE VERY BASICS LIKE FOOD, HOUSING, MEDICAL CARE, AND HEATING?: NOT HARD AT ALL

## 2024-07-02 SDOH — ECONOMIC STABILITY: FOOD INSECURITY: WITHIN THE PAST 12 MONTHS, THE FOOD YOU BOUGHT JUST DIDN'T LAST AND YOU DIDN'T HAVE MONEY TO GET MORE.: NEVER TRUE

## 2024-07-02 SDOH — ECONOMIC STABILITY: FOOD INSECURITY: WITHIN THE PAST 12 MONTHS, YOU WORRIED THAT YOUR FOOD WOULD RUN OUT BEFORE YOU GOT MONEY TO BUY MORE.: NEVER TRUE

## 2024-07-02 ASSESSMENT — ENCOUNTER SYMPTOMS
EYE DISCHARGE: 0
VOMITING: 0
SHORTNESS OF BREATH: 1
HEMOPTYSIS: 0
NAUSEA: 0
DIARRHEA: 0
ABDOMINAL PAIN: 0
BLOOD IN STOOL: 0
COUGH: 0
EYE REDNESS: 0

## 2024-07-02 NOTE — PROGRESS NOTES
BILITOT 0.5 02/07/2024 06:56 AM    ALKPHOS 96 02/07/2024 06:56 AM    AST 14 02/07/2024 06:56 AM    ALT 28 02/07/2024 06:56 AM     Lab Results   Component Value Date/Time    WBC 6.7 02/07/2024 06:56 AM    RBC 4.50 02/07/2024 06:56 AM    HGB 13.1 02/07/2024 06:56 AM    HCT 40.3 02/07/2024 06:56 AM    MCV 89.6 02/07/2024 06:56 AM    MCH 29.1 02/07/2024 06:56 AM    MCHC 32.5 02/07/2024 06:56 AM    RDW 13.6 02/07/2024 06:56 AM     02/07/2024 06:56 AM    MPV 10.8 02/07/2024 06:56 AM     Lab Results   Component Value Date/Time    TSH 1.78 11/13/2023 06:51 AM     Lab Results   Component Value Date/Time    CHOL 227 02/07/2024 06:56 AM    CHOL 169 02/03/2014 12:00 AM     02/07/2024 06:56 AM    LDL 92 02/03/2014 12:00 AM    HDL 34 02/07/2024 06:56 AM    LABA1C 5.6 09/26/2022 03:53 PM          Assessment/Plan:        1. Annual physical exam  Exam is WNL except scapula and updated pt's chart     2. Winged scapula of left side  Heat and stretches and ice -- 2-4 times per day     3. SOB (shortness of breath) on exertion  Pt will try and Breo inhaler daily and rinse mouth after or brush teeth.      Return in about 1 year (around 7/2/2025).      Electronically signed by Rabia De La Rosa MD on 7/2/2024 at 8:40 AM

## 2024-08-08 RX ORDER — ASPIRIN 81 MG/1
81 TABLET, COATED ORAL DAILY
Qty: 90 TABLET | Refills: 3 | Status: SHIPPED | OUTPATIENT
Start: 2024-08-08

## 2024-08-20 NOTE — PROGRESS NOTES
Subjective:      Patient ID: Ezio Bonilla is a 46 y.o. female. HPI  Patient is a 46 y.o. female in no acute distress. She is alert and oriented to person, place, and time. History  New patient referral from the ER for frequent UTI. She was evaluated in the ER on 7/3/2018 for dysuria, frequency and abdominal pain. UACS showed e.coli and was negative for significant hematuria. She did receive culture specific Macrobid. She did have a CT scan completed on the ER. This film was independently reviewed and reviewed with Dr. 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 There is a questionable ectopic ureterocele. The radiologist does feel that this could represent a large nabothian cyst or cystic/necrotic cervical malignancy. Patient did have a total abdominal hysterectomy in 2010 due to endometriosis with Dr. Jono Madera. It is unclear if this is a radical hysterectomy with complete removal of the cervix. Patient was a previous patient of Dr. Jasmin Rodriguez for frequent urinary tract infections. She denies constipation. She denies current frequency, nocturia, urgency, or urge incontinence. She has very rare stress incontinence, but does not require pads.     Urine cultures  7/2018 - e.coli  1/2018 - no growth  8/2017 - e.coli      Currently  Pt is here today for lower tract visualization. Patient has been well since last visit. She reports no recent gross hematuria dysuria. Patient has had no pain today. Patient did have a CT urogram.  This film was independently reviewed today. 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 Patient reports no significant urinary incontinence. No pain identified today. Cystoscopy Procedure Note    Indications:    Diagnosis Orders   1. Frequent UTI  IL CYSTOURETHROSCOPY   2.  Ureterocele  IL CYSTOURETHROSCOPY       Pre-operative Diagnosis: Diagnosis Orders   1. Frequent UTI  UT CYSTOURETHROSCOPY   2. Ureterocele  UT CYSTOURETHROSCOPY       Post-operative Diagnosis: Same    Surgeon: Reynaldo Zamudio     Assistants: None    Anesthesia : Local    Procedure Details   The risks, benefits, complications, treatment options, and expected outcomes were discussed with the patient. The patient concurred with the proposed plan, giving informed consent. Cystoscopy was performed today under local anesthesia, using sterile technique. The patient was placed in the dorsal lithotomy position, prepped with CHG, and draped in the usual sterile fashion. A 14 Vietnamese flexible cystoscope was used to systematically inspect both the urethra and bladder in their entirety. Findings:  Anterior urethra: normal without strictures  Hyperplasia: na  Bladder: Normal mucosa, without lesions. Ureteral orifice(s) was/were seen in the normal position and effluxing clear urine  Trabeculations No  Diverticulum No  Description: fullness on the left side of the floor of the bladder.,  No distinct cystocele seen. Specimens: UACS                 Complications:  None; patient tolerated the procedure well. Disposition: home           Condition: stable        Past Medical History:   Diagnosis Date    Bell's palsy 2013    Carpal tunnel syndrome     Chicken pox     Hydronephrosis     left     Hyperlipidemia 2013    Pneumonia     Renal atrophy     UTI (urinary tract infection)      Past Surgical History:   Procedure Laterality Date     SECTION      CYSTOSCOPY  2009    HYSTERECTOMY  2010    ROTATOR CUFF REPAIR Left 2017     Family History   Problem Relation Age of Onset    Cancer Mother         breast    Heart Disease Mother     Stroke Mother     Cancer Father      No current outpatient prescriptions on file prior to visit. No current facility-administered medications on file prior to visit.        No outpatient encounter prescriptions on file as of 8/6/2018. No facility-administered encounter medications on file as of 8/6/2018. Sulfa antibiotics  History   Smoking Status    Never Smoker   Smokeless Tobacco    Never Used     History   Alcohol Use No       Vitals:    08/06/18 1013   BP: 106/78     PHYSICAL EXAM:  Constitutional: Patient resting comfortably, in no acute distress. Neuro: Alert and oriented to person place and time. Cranial nerves grossly intact. Psych: Mood and affect normal.  Skin: Warm, dry  HEENT: normocephalic, atraumatic  Lymphatics: No palpable lymphadenopathy  Lungs: Respiratory effort normal, unlabored  Cardiovascular:  Normal peripheral pulses  Abdomen: Soft, non-tender, non-distended with no organomegaly or palpable masses. : No CVA tenderness. Bladder non-tender and not distended. Pelvic:       No results found for this visit on 08/06/18. Lab Results   Component Value Date    BUN 18 07/03/2018     Lab Results   Component Value Date    CREATININE 0.68 07/03/2018     Review of Systems   Constitutional: Negative for appetite change, chills and fever. Eyes: Negative for pain, redness and visual disturbance. Respiratory: Negative for cough, shortness of breath and wheezing. Cardiovascular: Negative for chest pain and leg swelling. Gastrointestinal: Negative for abdominal pain, nausea and vomiting. Genitourinary: Negative for difficulty urinating, dysuria, flank pain, frequency, hematuria, pelvic pain, vaginal bleeding and vaginal discharge. Musculoskeletal: Negative for back pain, joint swelling and myalgias. Skin: Negative for color change, rash and wound. Neurological: Negative for dizziness, tremors and numbness. Hematological: Negative for adenopathy. Does not bruise/bleed easily. Objective:   Physical Exam    Assessment: This is a 46 y.o. female with the following diagnoses:   Diagnosis Orders   1. Frequent UTI  FL CYSTOURETHROSCOPY   2.  Ureterocele  FL CYSTOURETHROSCOPY         Plan:      Patient does have a ureterocele. It was not readily apparent on cystoscopy today. I do think if surgical repair of this is required and abdominal approach might be best.  For now we will see the patient back in 3 months. We can consider prophylactic antibiotics. 4 = No assist / stand by assistance

## 2024-08-24 ENCOUNTER — APPOINTMENT (OUTPATIENT)
Dept: GENERAL RADIOLOGY | Age: 59
End: 2024-08-24
Payer: COMMERCIAL

## 2024-08-24 ENCOUNTER — HOSPITAL ENCOUNTER (EMERGENCY)
Age: 59
Discharge: HOME | End: 2024-08-24
Attending: FAMILY MEDICINE
Payer: COMMERCIAL

## 2024-08-24 VITALS
BODY MASS INDEX: 41.95 KG/M2 | WEIGHT: 293 LBS | SYSTOLIC BLOOD PRESSURE: 134 MMHG | OXYGEN SATURATION: 96 % | HEART RATE: 56 BPM | RESPIRATION RATE: 14 BRPM | TEMPERATURE: 97.6 F | DIASTOLIC BLOOD PRESSURE: 79 MMHG | HEIGHT: 70 IN

## 2024-08-24 DIAGNOSIS — R07.9 CHEST PAIN, UNSPECIFIED TYPE: Primary | ICD-10-CM

## 2024-08-24 DIAGNOSIS — M27.3 DRY TOOTH SOCKET: ICD-10-CM

## 2024-08-24 LAB
ANION GAP SERPL CALCULATED.3IONS-SCNC: 10 MMOL/L (ref 9–17)
BASOPHILS # BLD: 0.04 K/UL (ref 0–0.2)
BASOPHILS NFR BLD: 1 % (ref 0–2)
BUN SERPL-MCNC: 17 MG/DL (ref 8–23)
BUN/CREAT SERPL: 21 (ref 9–20)
CALCIUM SERPL-MCNC: 9.4 MG/DL (ref 8.6–10.4)
CHLORIDE SERPL-SCNC: 107 MMOL/L (ref 98–107)
CO2 SERPL-SCNC: 22 MMOL/L (ref 20–31)
CREAT SERPL-MCNC: 0.8 MG/DL (ref 0.5–0.9)
EKG ATRIAL RATE: 60 BPM
EKG P AXIS: 49 DEGREES
EKG P-R INTERVAL: 206 MS
EKG Q-T INTERVAL: 440 MS
EKG QRS DURATION: 90 MS
EKG QTC CALCULATION (BAZETT): 440 MS
EKG R AXIS: -3 DEGREES
EKG T AXIS: 33 DEGREES
EKG VENTRICULAR RATE: 60 BPM
EOSINOPHIL # BLD: 0.17 K/UL (ref 0–0.4)
EOSINOPHILS RELATIVE PERCENT: 2 % (ref 0–5)
ERYTHROCYTE [DISTWIDTH] IN BLOOD BY AUTOMATED COUNT: 13.2 % (ref 12.1–15.2)
GFR, ESTIMATED: 84 ML/MIN/1.73M2
GLUCOSE SERPL-MCNC: 105 MG/DL (ref 70–99)
HCT VFR BLD AUTO: 38 % (ref 36–46)
HGB BLD-MCNC: 12.6 G/DL (ref 12–16)
IMM GRANULOCYTES # BLD AUTO: 0.01 K/UL (ref 0–0.3)
IMM GRANULOCYTES NFR BLD: 0 % (ref 0–5)
LYMPHOCYTES NFR BLD: 2.19 K/UL (ref 1–4.8)
LYMPHOCYTES RELATIVE PERCENT: 29 % (ref 15–40)
MCH RBC QN AUTO: 30 PG (ref 26–34)
MCHC RBC AUTO-ENTMCNC: 33.2 G/DL (ref 31–37)
MCV RBC AUTO: 90.5 FL (ref 80–100)
MONOCYTES NFR BLD: 0.64 K/UL (ref 0–1)
MONOCYTES NFR BLD: 8 % (ref 4–8)
NEUTROPHILS NFR BLD: 60 % (ref 47–75)
NEUTS SEG NFR BLD: 4.6 K/UL (ref 2.5–7)
PLATELET # BLD AUTO: 326 K/UL (ref 140–450)
PMV BLD AUTO: 10.3 FL (ref 6–12)
POTASSIUM SERPL-SCNC: 4.7 MMOL/L (ref 3.7–5.3)
RBC # BLD AUTO: 4.2 M/UL (ref 4–5.2)
SODIUM SERPL-SCNC: 139 MMOL/L (ref 135–144)
TROPONIN I SERPL HS-MCNC: 6 NG/L (ref 0–14)
TROPONIN I SERPL HS-MCNC: <6 NG/L (ref 0–14)
WBC OTHER # BLD: 7.7 K/UL (ref 3.5–11)

## 2024-08-24 PROCEDURE — 84484 ASSAY OF TROPONIN QUANT: CPT

## 2024-08-24 PROCEDURE — 93010 ELECTROCARDIOGRAM REPORT: CPT | Performed by: INTERNAL MEDICINE

## 2024-08-24 PROCEDURE — 6370000000 HC RX 637 (ALT 250 FOR IP): Performed by: FAMILY MEDICINE

## 2024-08-24 PROCEDURE — 85025 COMPLETE CBC W/AUTO DIFF WBC: CPT

## 2024-08-24 PROCEDURE — 93005 ELECTROCARDIOGRAM TRACING: CPT | Performed by: FAMILY MEDICINE

## 2024-08-24 PROCEDURE — 80048 BASIC METABOLIC PNL TOTAL CA: CPT

## 2024-08-24 PROCEDURE — 71045 X-RAY EXAM CHEST 1 VIEW: CPT

## 2024-08-24 PROCEDURE — 36415 COLL VENOUS BLD VENIPUNCTURE: CPT

## 2024-08-24 PROCEDURE — 99285 EMERGENCY DEPT VISIT HI MDM: CPT

## 2024-08-24 RX ORDER — ASPIRIN 81 MG/1
324 TABLET, CHEWABLE ORAL ONCE
Status: COMPLETED | OUTPATIENT
Start: 2024-08-24 | End: 2024-08-24

## 2024-08-24 RX ORDER — ISOSORBIDE MONONITRATE 30 MG/1
30 TABLET, EXTENDED RELEASE ORAL DAILY
Qty: 30 TABLET | Refills: 0 | Status: SHIPPED | OUTPATIENT
Start: 2024-08-24

## 2024-08-24 RX ADMIN — ASPIRIN 324 MG: 81 TABLET, CHEWABLE ORAL at 06:31

## 2024-08-24 ASSESSMENT — PAIN - FUNCTIONAL ASSESSMENT
PAIN_FUNCTIONAL_ASSESSMENT: NONE - DENIES PAIN
PAIN_FUNCTIONAL_ASSESSMENT: 0-10

## 2024-08-24 ASSESSMENT — PAIN DESCRIPTION - LOCATION: LOCATION: CHEST

## 2024-08-24 ASSESSMENT — HEART SCORE: ECG: NORMAL

## 2024-08-24 ASSESSMENT — PAIN DESCRIPTION - ORIENTATION: ORIENTATION: LEFT

## 2024-08-24 ASSESSMENT — PAIN SCALES - GENERAL
PAINLEVEL_OUTOF10: 4
PAINLEVEL_OUTOF10: 0

## 2024-08-24 ASSESSMENT — ENCOUNTER SYMPTOMS
SHORTNESS OF BREATH: 1
NAUSEA: 1

## 2024-08-24 ASSESSMENT — PAIN DESCRIPTION - DESCRIPTORS: DESCRIPTORS: HEAVINESS

## 2024-08-24 NOTE — ED PROVIDER NOTES
[facility]  EMERGENCY DEPARTMENT ENCOUNTER      Pt Name: Sara Miller  MRN: [id number]  Birthdate 1965  Date of evaluation: 2024  Provider: Miguel Ángel Vaughn MD    CHIEF COMPLAINT       Chief Complaint   Patient presents with    Chest Pain    Shortness of Breath     Pt here for L chest heaviness. Pt states hx of heart stents. Pt does report nausea and sob.     Dental Pain         HISTORY OF PRESENT ILLNESS      Sara Miller is a 59 y.o. female who presents to the emergency department via private vehicle with significant other, patient complaining of some chest pain on the left aspect of her chest, describing a 4 out of 10 heaviness, nonradiating, little shortness of breath and nausea without vomiting.  Patient concerned as she has a history of heart stents 4 months prior, feels symptoms are similar though slightly worse.  Patient also concern for some dental pain indicating her right lower posterior tooth, states she recently had the tooth removed and is worried about infection.    PCP: Rj  Cards: Olivier        REVIEW OF SYSTEMS       Review of Systems   HENT:  Positive for dental problem.    Respiratory:  Positive for shortness of breath.    Cardiovascular:  Positive for chest pain.   Gastrointestinal:  Positive for nausea.   All other systems reviewed and are negative.        PAST MEDICAL HISTORY     Past Medical History:   Diagnosis Date    Bell's palsy 2013    Carpal tunnel syndrome     Chicken pox     Hydronephrosis 2009    left     Hyperlipidemia 2013    Myalgia 2022    Pneumonia     Renal atrophy     Sleep apnea     SVT (supraventricular tachycardia) (HCC) 2022    UTI (urinary tract infection)          SURGICAL HISTORY       Past Surgical History:   Procedure Laterality Date     SECTION      COLONOSCOPY N/A 2021    COLONOSCOPY performed by Brain Jeronimo MD at [facility] ENDOSCOPY    CYSTOSCOPY  2009    CYSTOSCOPY Left 2022    CYSTOSCOPY    2. Dry tooth socket          DISPOSITION/PLAN     DISPOSITION Decision To Discharge 08/24/2024 07:52:39 AM  Condition at Disposition: Good      PATIENT REFERRED TO:  Antony Aguayo MD  [address] [address]  [phone]    Call       Follow up with established Dentist    Call       Rabia De La Rosa MD  [address] [address]  [phone]    Call   As needed    [facility] ED  [address] [address]  [phone]    As needed, If symptoms worsen      DISCHARGE MEDICATIONS:  New Prescriptions    AMOXICILLIN-CLAVULANATE (AUGMENTIN) 875-125 MG PER TABLET    Take 1 tablet by mouth 2 times daily for 10 days    ISOSORBIDE MONONITRATE (IMDUR) 30 MG EXTENDED RELEASE TABLET    Take 1 tablet by mouth daily       (Please note that portions of this note were completed with a voice recognition program.  Efforts were made to edit the dictations but occasionally words are mis-transcribed.)    Miguel Ángel Vaughn MD (electronically signed)  Attending Emergency Physician           Miguel Ángel Vaughn MD  08/24/24 0802

## 2024-08-26 ENCOUNTER — TELEPHONE (OUTPATIENT)
Dept: CARDIOLOGY CLINIC | Age: 59
End: 2024-08-26

## 2024-08-26 ENCOUNTER — OFFICE VISIT (OUTPATIENT)
Dept: FAMILY MEDICINE CLINIC | Age: 59
End: 2024-08-26
Payer: COMMERCIAL

## 2024-08-26 VITALS
BODY MASS INDEX: 46.63 KG/M2 | HEART RATE: 60 BPM | WEIGHT: 293 LBS | DIASTOLIC BLOOD PRESSURE: 64 MMHG | OXYGEN SATURATION: 97 % | SYSTOLIC BLOOD PRESSURE: 118 MMHG

## 2024-08-26 DIAGNOSIS — R07.89 OTHER CHEST PAIN: ICD-10-CM

## 2024-08-26 DIAGNOSIS — R53.83 OTHER FATIGUE: Primary | ICD-10-CM

## 2024-08-26 DIAGNOSIS — R06.02 SOB (SHORTNESS OF BREATH) ON EXERTION: ICD-10-CM

## 2024-08-26 DIAGNOSIS — G47.33 OSA (OBSTRUCTIVE SLEEP APNEA): ICD-10-CM

## 2024-08-26 PROCEDURE — 99213 OFFICE O/P EST LOW 20 MIN: CPT | Performed by: FAMILY MEDICINE

## 2024-08-26 ASSESSMENT — ENCOUNTER SYMPTOMS
COUGH: 0
SORE THROAT: 0
SPUTUM PRODUCTION: 0
EYE DISCHARGE: 0
BLOOD IN STOOL: 0
CHANGE IN BOWEL HABIT: 0
NAUSEA: 0
EYE REDNESS: 0
FACIAL SWELLING: 0
SHORTNESS OF BREATH: 1
WHEEZING: 0
DIARRHEA: 0
ABDOMINAL PAIN: 0
VOMITING: 0

## 2024-08-26 NOTE — PATIENT INSTRUCTIONS
SURVEY:    You may be receiving a survey from [facility] [employer] regarding your visit today.    Please complete the survey to enable us to provide the highest quality of care to you and your family.    If you cannot score us a very good (5 Stars) on any question, please call the office to discuss how we could have made your experience a very good one.    Thank you.    Clinical Care Team: MD Tyrel Bhagat LPN              Triage: Yolanda Gregorio CMA              Clerical Team: Yolanda Avila

## 2024-08-26 NOTE — PROGRESS NOTES
HPI Notes    Name: Sara Miller  : 1965        Chief Complaint:     Chief Complaint   Patient presents with    Fatigue     Patient complains of fatigue.     Shortness of Breath     Patient complains of shortness of breath. Was at the ED on 24 for chest pain, shortness of breath. ED started her on isosorbide and she said that it is helping some.        History of Present Illness:     Sara Milelr is a 59 y.o.  female who presents with Fatigue (Patient complains of fatigue. ) and Shortness of Breath (Patient complains of shortness of breath. Was at the ED on 24 for chest pain, shortness of breath. ED started her on isosorbide and she said that it is helping some. )      Fatigue  This is a chronic problem. The current episode started more than 1 year ago. The problem occurs daily. The problem has been unchanged (pt wears her CPAP nightly but still doesn't feel rested in the AM.). Associated symptoms include fatigue. Pertinent negatives include no abdominal pain, change in bowel habit, chest pain, chills, congestion, coughing, fever, headaches, nausea, rash, sore throat or vomiting. Nothing aggravates the symptoms. She has tried nothing for the symptoms.   Shortness of Breath  This is a new problem. The current episode started more than 1 month ago. The problem has been unchanged. Pertinent negatives include no abdominal pain, chest pain, fever, headaches, leg swelling, rash, sore throat, sputum production, vomiting or wheezing. The symptoms are aggravated by any activity. She has tried nothing for the symptoms. There is no history of asthma, DVT or a recent surgery.     Sleep apnea - pt wears her CPAP nightly but feels may not be working as wakes up tired still. Pt knows she need some new supplies so will see if her machine pressure can be increased?    Chest pain - 2d ago in AM pt felt like someone was sitting on her chest so went to the ER and all test were ok in ER.   Pt had all normal  06:23 AM    RBC 4.20 08/24/2024 06:23 AM    HGB 12.6 08/24/2024 06:23 AM    HCT 38.0 08/24/2024 06:23 AM    MCV 90.5 08/24/2024 06:23 AM    MCH 30.0 08/24/2024 06:23 AM    MCHC 33.2 08/24/2024 06:23 AM    RDW 13.2 08/24/2024 06:23 AM     08/24/2024 06:23 AM    MPV 10.3 08/24/2024 06:23 AM     Lab Results   Component Value Date/Time    TSH 1.78 11/13/2023 06:51 AM     Lab Results   Component Value Date/Time    CHOL 227 02/07/2024 06:56 AM    CHOL 169 02/03/2014 12:00 AM     02/07/2024 06:56 AM    LDL 92 02/03/2014 12:00 AM    HDL 34 02/07/2024 06:56 AM    LABA1C 5.6 09/26/2022 03:53 PM          Assessment/Plan:        1. Other fatigue  Reviewed recent labs WNL and pt to ck on CPAP supplies and machine to see if needs higher pressure  Pt to go talk to cardiology office after visit today     2. SOB (shortness of breath) on exertion  Stay on Imdur, d/w cardiology and may need new CPAP pressure     3. ITZ (obstructive sleep apnea)  Pt to get new CPAP supplies and ck if increase pressure or new sleep study  - DME Order for CPAP as OP    4. Other chest pain  Resolved and stay on the Imdur with cardiology f/u        Return if symptoms worsen or fail to improve.      Electronically signed by Rabia De La Rosa MD on 8/26/2024 at 7:57 AM

## 2024-08-26 NOTE — TELEPHONE ENCOUNTER
Patient stopped by office. She was to [facility] ED over the weekend with chest pain/pressure/tightness,  fatigue, SOB.  Was given some medication on D/C from ED. Patient reports she is feeling better. Does not have the chest pressure/tightness anymore.  She was told to follow up with Dr Aguayo.  Her next appt is not until October.  Did doctor want to see her sooner or do anything else for him?  Please advise

## 2024-09-10 ENCOUNTER — HOSPITAL ENCOUNTER (OUTPATIENT)
Age: 59
Discharge: HOME | End: 2024-09-10
Payer: COMMERCIAL

## 2024-09-10 DIAGNOSIS — I47.10 SVT (SUPRAVENTRICULAR TACHYCARDIA) (HCC): ICD-10-CM

## 2024-09-10 DIAGNOSIS — E78.5 HYPERLIPIDEMIA, UNSPECIFIED HYPERLIPIDEMIA TYPE: ICD-10-CM

## 2024-09-10 DIAGNOSIS — R07.9 CHEST PAIN, UNSPECIFIED TYPE: ICD-10-CM

## 2024-09-10 DIAGNOSIS — E55.9 VITAMIN D DEFICIENCY DISEASE: ICD-10-CM

## 2024-09-10 DIAGNOSIS — R06.02 SOB (SHORTNESS OF BREATH): ICD-10-CM

## 2024-09-10 DIAGNOSIS — Z98.61 POST PTCA: ICD-10-CM

## 2024-09-10 LAB
25(OH)D3 SERPL-MCNC: 23.2 NG/ML (ref 30–100)
ALBUMIN SERPL-MCNC: 4 G/DL (ref 3.5–5.2)
ALP SERPL-CCNC: 83 U/L (ref 35–104)
ALT SERPL-CCNC: 25 U/L (ref 5–33)
ANION GAP SERPL CALCULATED.3IONS-SCNC: 11 MMOL/L (ref 9–17)
AST SERPL-CCNC: 15 U/L
BASOPHILS # BLD: 0.06 K/UL (ref 0–0.2)
BASOPHILS NFR BLD: 1 % (ref 0–2)
BILIRUB SERPL-MCNC: 0.6 MG/DL (ref 0.3–1.2)
BUN SERPL-MCNC: 16 MG/DL (ref 8–23)
BUN/CREAT SERPL: 18 (ref 9–20)
CALCIUM SERPL-MCNC: 9.6 MG/DL (ref 8.6–10.4)
CHLORIDE SERPL-SCNC: 104 MMOL/L (ref 98–107)
CHOLEST SERPL-MCNC: 216 MG/DL (ref 0–199)
CHOLESTEROL/HDL RATIO: 6
CO2 SERPL-SCNC: 24 MMOL/L (ref 20–31)
CREAT SERPL-MCNC: 0.9 MG/DL (ref 0.5–0.9)
EOSINOPHIL # BLD: 0.26 K/UL (ref 0–0.4)
EOSINOPHILS RELATIVE PERCENT: 3 % (ref 0–5)
ERYTHROCYTE [DISTWIDTH] IN BLOOD BY AUTOMATED COUNT: 13.2 % (ref 12.1–15.2)
GFR, ESTIMATED: 73 ML/MIN/1.73M2
GLUCOSE SERPL-MCNC: 106 MG/DL (ref 70–99)
HCT VFR BLD AUTO: 39.2 % (ref 36–46)
HDLC SERPL-MCNC: 34 MG/DL
HGB BLD-MCNC: 12.8 G/DL (ref 12–16)
IMM GRANULOCYTES # BLD AUTO: 0.02 K/UL (ref 0–0.3)
IMM GRANULOCYTES NFR BLD: 0 % (ref 0–5)
LDLC SERPL CALC-MCNC: 136 MG/DL (ref 0–100)
LYMPHOCYTES NFR BLD: 2.14 K/UL (ref 1–4.8)
LYMPHOCYTES RELATIVE PERCENT: 25 % (ref 15–40)
MAGNESIUM SERPL-MCNC: 2 MG/DL (ref 1.6–2.6)
MCH RBC QN AUTO: 29.8 PG (ref 26–34)
MCHC RBC AUTO-ENTMCNC: 32.7 G/DL (ref 31–37)
MCV RBC AUTO: 91.4 FL (ref 80–100)
MONOCYTES NFR BLD: 0.65 K/UL (ref 0–1)
MONOCYTES NFR BLD: 8 % (ref 4–8)
NEUTROPHILS NFR BLD: 63 % (ref 47–75)
NEUTS SEG NFR BLD: 5.52 K/UL (ref 2.5–7)
PLATELET # BLD AUTO: 359 K/UL (ref 140–450)
PMV BLD AUTO: 10.7 FL (ref 6–12)
POTASSIUM SERPL-SCNC: 4.6 MMOL/L (ref 3.7–5.3)
PROT SERPL-MCNC: 6.7 G/DL (ref 6.4–8.3)
RBC # BLD AUTO: 4.29 M/UL (ref 4–5.2)
SODIUM SERPL-SCNC: 139 MMOL/L (ref 135–144)
TRIGL SERPL-MCNC: 231 MG/DL
TSH SERPL DL<=0.05 MIU/L-ACNC: 1.9 UIU/ML (ref 0.3–5)
VLDLC SERPL CALC-MCNC: 46 MG/DL
WBC OTHER # BLD: 8.7 K/UL (ref 3.5–11)

## 2024-09-10 PROCEDURE — 84443 ASSAY THYROID STIM HORMONE: CPT

## 2024-09-10 PROCEDURE — 80053 COMPREHEN METABOLIC PANEL: CPT

## 2024-09-10 PROCEDURE — 83735 ASSAY OF MAGNESIUM: CPT

## 2024-09-10 PROCEDURE — 85025 COMPLETE CBC W/AUTO DIFF WBC: CPT

## 2024-09-10 PROCEDURE — 82306 VITAMIN D 25 HYDROXY: CPT

## 2024-09-10 PROCEDURE — 36415 COLL VENOUS BLD VENIPUNCTURE: CPT

## 2024-09-10 PROCEDURE — 80061 LIPID PANEL: CPT

## 2024-09-11 ENCOUNTER — HOSPITAL ENCOUNTER (OUTPATIENT)
Dept: CT IMAGING | Age: 59
Discharge: HOME | End: 2024-09-13
Attending: INTERNAL MEDICINE
Payer: COMMERCIAL

## 2024-09-11 ENCOUNTER — HOSPITAL ENCOUNTER (OUTPATIENT)
Age: 59
Discharge: HOME | End: 2024-09-11
Attending: INTERNAL MEDICINE
Payer: COMMERCIAL

## 2024-09-11 ENCOUNTER — OFFICE VISIT (OUTPATIENT)
Dept: CARDIOLOGY CLINIC | Age: 59
End: 2024-09-11
Payer: COMMERCIAL

## 2024-09-11 VITALS
BODY MASS INDEX: 47.06 KG/M2 | HEART RATE: 61 BPM | SYSTOLIC BLOOD PRESSURE: 120 MMHG | WEIGHT: 293 LBS | DIASTOLIC BLOOD PRESSURE: 60 MMHG | OXYGEN SATURATION: 96 %

## 2024-09-11 DIAGNOSIS — R06.02 SOB (SHORTNESS OF BREATH): ICD-10-CM

## 2024-09-11 DIAGNOSIS — R06.02 SOB (SHORTNESS OF BREATH): Primary | ICD-10-CM

## 2024-09-11 DIAGNOSIS — R06.02 SHORTNESS OF BREATH: ICD-10-CM

## 2024-09-11 DIAGNOSIS — R07.9 CHEST PAIN, UNSPECIFIED TYPE: ICD-10-CM

## 2024-09-11 LAB
CORTIS SERPL-MCNC: 7.3 UG/DL (ref 2.5–19.5)
CORTISOL COLLECTION INFO: NORMAL
FERRITIN SERPL-MCNC: 267 NG/ML (ref 13–150)
FOLATE SERPL-MCNC: 4.3 NG/ML (ref 4.8–24.2)
IRON SATN MFR SERPL: 25 % (ref 20–55)
IRON SERPL-MCNC: 83 UG/DL (ref 37–145)
TIBC SERPL-MCNC: 331 UG/DL (ref 250–450)
UNSATURATED IRON BINDING CAPACITY: 248 UG/DL (ref 112–347)
VIT B12 SERPL-MCNC: 506 PG/ML (ref 232–1245)

## 2024-09-11 PROCEDURE — 83540 ASSAY OF IRON: CPT

## 2024-09-11 PROCEDURE — 82607 VITAMIN B-12: CPT

## 2024-09-11 PROCEDURE — 83550 IRON BINDING TEST: CPT

## 2024-09-11 PROCEDURE — 82746 ASSAY OF FOLIC ACID SERUM: CPT

## 2024-09-11 PROCEDURE — 86235 NUCLEAR ANTIGEN ANTIBODY: CPT

## 2024-09-11 PROCEDURE — 6360000004 HC RX CONTRAST MEDICATION: Performed by: INTERNAL MEDICINE

## 2024-09-11 PROCEDURE — 82533 TOTAL CORTISOL: CPT

## 2024-09-11 PROCEDURE — 82024 ASSAY OF ACTH: CPT

## 2024-09-11 PROCEDURE — 82728 ASSAY OF FERRITIN: CPT

## 2024-09-11 PROCEDURE — 36415 COLL VENOUS BLD VENIPUNCTURE: CPT

## 2024-09-11 PROCEDURE — 99214 OFFICE O/P EST MOD 30 MIN: CPT | Performed by: INTERNAL MEDICINE

## 2024-09-11 PROCEDURE — 71275 CT ANGIOGRAPHY CHEST: CPT

## 2024-09-11 RX ORDER — RANOLAZINE 500 MG/1
500 TABLET, EXTENDED RELEASE ORAL 2 TIMES DAILY
Qty: 60 TABLET | Refills: 3 | Status: SHIPPED | OUTPATIENT
Start: 2024-09-11

## 2024-09-11 RX ORDER — AMLODIPINE BESYLATE 5 MG/1
5 TABLET ORAL DAILY
Qty: 30 TABLET | Refills: 11 | Status: SHIPPED | OUTPATIENT
Start: 2024-09-11

## 2024-09-11 RX ORDER — ROSUVASTATIN CALCIUM 20 MG/1
20 TABLET, COATED ORAL DAILY
Qty: 30 TABLET | Refills: 11 | Status: SHIPPED | OUTPATIENT
Start: 2024-09-11

## 2024-09-11 RX ORDER — IOPAMIDOL 755 MG/ML
100 INJECTION, SOLUTION INTRAVASCULAR
Status: COMPLETED | OUTPATIENT
Start: 2024-09-11 | End: 2024-09-11

## 2024-09-11 RX ORDER — PANTOPRAZOLE SODIUM 40 MG/1
40 TABLET, DELAYED RELEASE ORAL DAILY
Qty: 30 TABLET | Refills: 11 | Status: SHIPPED | OUTPATIENT
Start: 2024-09-11

## 2024-09-11 RX ORDER — FAMOTIDINE 20 MG/1
20 TABLET, FILM COATED ORAL 2 TIMES DAILY
Qty: 60 TABLET | Refills: 11 | Status: SHIPPED | OUTPATIENT
Start: 2024-09-11

## 2024-09-11 RX ADMIN — IOPAMIDOL 100 ML: 755 INJECTION, SOLUTION INTRAVENOUS at 09:10

## 2024-09-12 LAB
DEPRECATED S PNEUM5 IGG SER-MCNC: <0.4 U/ML
ENA JO1 IGG SER-ACNC: <0.4 U/ML
ENA SCL70 AB SER IA-ACNC: <0.6 U/ML
ENA SM AB SER-ACNC: <0.8 U/ML
ENA SS-A IGG SER QL: <0.3 U/ML
ENA SS-B IGG SER IA-ACNC: <0.3 U/ML
U1 SNRNP IGG SER IA-ACNC: 0.7 U/ML
U1 SNRNP IGG SER IA-ACNC: 1.3 U/ML

## 2024-09-14 LAB — ACTH PLAS-MCNC: 28 PG/ML (ref 7–63)

## 2024-09-16 RX ORDER — LISINOPRIL 5 MG/1
5 TABLET ORAL DAILY
Qty: 90 TABLET | Refills: 1 | Status: SHIPPED | OUTPATIENT
Start: 2024-09-16

## 2024-09-16 RX ORDER — METOPROLOL TARTRATE 25 MG/1
25 TABLET, FILM COATED ORAL 2 TIMES DAILY
Qty: 180 TABLET | Refills: 3 | Status: SHIPPED | OUTPATIENT
Start: 2024-09-16

## 2024-09-26 ENCOUNTER — HOSPITAL ENCOUNTER (OUTPATIENT)
Age: 59
Discharge: HOME | End: 2024-09-28
Attending: INTERNAL MEDICINE
Payer: COMMERCIAL

## 2024-09-26 ENCOUNTER — HOSPITAL ENCOUNTER (OUTPATIENT)
Dept: NUCLEAR MEDICINE | Age: 59
Discharge: HOME | End: 2024-09-28
Attending: INTERNAL MEDICINE
Payer: COMMERCIAL

## 2024-09-26 VITALS — HEART RATE: 65 BPM | SYSTOLIC BLOOD PRESSURE: 115 MMHG | DIASTOLIC BLOOD PRESSURE: 61 MMHG

## 2024-09-26 DIAGNOSIS — R06.02 SOB (SHORTNESS OF BREATH): ICD-10-CM

## 2024-09-26 DIAGNOSIS — R07.9 CHEST PAIN, UNSPECIFIED TYPE: ICD-10-CM

## 2024-09-26 DIAGNOSIS — R06.02 SHORTNESS OF BREATH: ICD-10-CM

## 2024-09-26 LAB
ECHO AO ASC DIAM: 2.3 CM
ECHO AO ROOT DIAM: 2.8 CM
ECHO AV CUSP MM: 1.9 CM
ECHO EST RA PRESSURE: 8 MMHG
ECHO LA DIAMETER: 4.1 CM
ECHO LA TO AORTIC ROOT RATIO: 1.46
ECHO LV E' LATERAL VELOCITY: 13.2 CM/S
ECHO LV E' SEPTAL VELOCITY: 12.4 CM/S
ECHO LV INTERNAL DIMENSION DIASTOLIC MMODE: 4.4 CM (ref 3.9–5.3)
ECHO LV INTERNAL DIMENSION SYSTOLIC MMODE: 2.5 CM
ECHO LV IVSD MMODE: 1 CM (ref 0.6–0.9)
ECHO LV IVSS MMODE: 1.4 CM
ECHO LV POSTERIOR WALL DIASTOLIC MMODE: 1 CM (ref 0.6–0.9)
ECHO LV POSTERIOR WALL SYSTOLIC MMODE: 1.2 CM
ECHO LVOT AREA: 4.5 CM2
ECHO LVOT DIAM: 2.4 CM
ECHO LVOT MEAN GRADIENT: 3 MMHG
ECHO LVOT PEAK GRADIENT: 4 MMHG
ECHO LVOT PEAK VELOCITY: 1.1 M/S
ECHO LVOT SV: 115.8 ML
ECHO LVOT VTI: 25.6 CM
ECHO MV A VELOCITY: 0.64 M/S
ECHO MV E DECELERATION TIME (DT): 179.8 MS
ECHO MV E VELOCITY: 0.78 M/S
ECHO MV E/A RATIO: 1.22
ECHO MV E/E' LATERAL: 5.91
ECHO MV E/E' RATIO (AVERAGED): 6.1
ECHO MV E/E' SEPTAL: 6.29
ECHO PV MAX VELOCITY: 0.8 M/S
ECHO PV PEAK GRADIENT: 3 MMHG
ECHO RIGHT VENTRICULAR SYSTOLIC PRESSURE (RVSP): 31 MMHG
ECHO TV REGURGITANT MAX VELOCITY: 2.42 M/S
ECHO TV REGURGITANT PEAK GRADIENT: 23 MMHG
NUC STRESS EJECTION FRACTION: 71 %
STRESS TARGET HR: 160 BPM
TID: 1.2

## 2024-09-26 PROCEDURE — 93306 TTE W/DOPPLER COMPLETE: CPT

## 2024-09-26 PROCEDURE — 93017 CV STRESS TEST TRACING ONLY: CPT

## 2024-09-26 PROCEDURE — 78452 HT MUSCLE IMAGE SPECT MULT: CPT

## 2024-09-26 PROCEDURE — 2580000003 HC RX 258: Performed by: INTERNAL MEDICINE

## 2024-09-26 PROCEDURE — 3430000000 HC RX DIAGNOSTIC RADIOPHARMACEUTICAL: Performed by: INTERNAL MEDICINE

## 2024-09-26 PROCEDURE — 6360000002 HC RX W HCPCS: Performed by: INTERNAL MEDICINE

## 2024-09-26 PROCEDURE — A9500 TC99M SESTAMIBI: HCPCS | Performed by: INTERNAL MEDICINE

## 2024-09-26 RX ORDER — AMINOPHYLLINE 25 MG/ML
50 INJECTION, SOLUTION INTRAVENOUS PRN
Status: DISPENSED | OUTPATIENT
Start: 2024-09-26 | End: 2024-09-26

## 2024-09-26 RX ORDER — REGADENOSON 0.08 MG/ML
0.4 INJECTION, SOLUTION INTRAVENOUS
Status: COMPLETED | OUTPATIENT
Start: 2024-09-26 | End: 2024-09-26

## 2024-09-26 RX ORDER — SODIUM CHLORIDE 0.9 % (FLUSH) 0.9 %
5-40 SYRINGE (ML) INJECTION PRN
Status: ACTIVE | OUTPATIENT
Start: 2024-09-26 | End: 2024-09-26

## 2024-09-26 RX ORDER — TETRAKIS(2-METHOXYISOBUTYLISOCYANIDE)COPPER(I) TETRAFLUOROBORATE 1 MG/ML
30 INJECTION, POWDER, LYOPHILIZED, FOR SOLUTION INTRAVENOUS
Status: COMPLETED | OUTPATIENT
Start: 2024-09-26 | End: 2024-09-26

## 2024-09-26 RX ORDER — TETRAKIS(2-METHOXYISOBUTYLISOCYANIDE)COPPER(I) TETRAFLUOROBORATE 1 MG/ML
10 INJECTION, POWDER, LYOPHILIZED, FOR SOLUTION INTRAVENOUS
Status: COMPLETED | OUTPATIENT
Start: 2024-09-26 | End: 2024-09-26

## 2024-09-26 RX ADMIN — REGADENOSON 0.4 MG: 0.08 INJECTION, SOLUTION INTRAVENOUS at 08:12

## 2024-09-26 RX ADMIN — SODIUM CHLORIDE, PRESERVATIVE FREE 10 ML: 5 INJECTION INTRAVENOUS at 08:12

## 2024-09-26 RX ADMIN — TETRAKIS(2-METHOXYISOBUTYLISOCYANIDE)COPPER(I) TETRAFLUOROBORATE 30 MILLICURIE: 1 INJECTION, POWDER, LYOPHILIZED, FOR SOLUTION INTRAVENOUS at 07:48

## 2024-09-26 RX ADMIN — TETRAKIS(2-METHOXYISOBUTYLISOCYANIDE)COPPER(I) TETRAFLUOROBORATE 10 MILLICURIE: 1 INJECTION, POWDER, LYOPHILIZED, FOR SOLUTION INTRAVENOUS at 07:48

## 2024-09-30 LAB
ECHO AO ASC DIAM: 2.3 CM
ECHO AO ROOT DIAM: 2.8 CM
ECHO AV CUSP MM: 1.9 CM
ECHO EST RA PRESSURE: 8 MMHG
ECHO LA DIAMETER: 4.1 CM
ECHO LA TO AORTIC ROOT RATIO: 1.46
ECHO LV E' LATERAL VELOCITY: 13.2 CM/S
ECHO LV E' SEPTAL VELOCITY: 12.4 CM/S
ECHO LV EF PHYSICIAN: 60 %
ECHO LV INTERNAL DIMENSION DIASTOLIC MMODE: 4.4 CM (ref 3.9–5.3)
ECHO LV INTERNAL DIMENSION SYSTOLIC MMODE: 2.5 CM
ECHO LV IVSD MMODE: 1 CM (ref 0.6–0.9)
ECHO LV IVSS MMODE: 1.4 CM
ECHO LV POSTERIOR WALL DIASTOLIC MMODE: 1 CM (ref 0.6–0.9)
ECHO LV POSTERIOR WALL SYSTOLIC MMODE: 1.2 CM
ECHO LVOT AREA: 4.5 CM2
ECHO LVOT DIAM: 2.4 CM
ECHO LVOT MEAN GRADIENT: 3 MMHG
ECHO LVOT PEAK GRADIENT: 4 MMHG
ECHO LVOT PEAK VELOCITY: 1.1 M/S
ECHO LVOT SV: 115.8 ML
ECHO LVOT VTI: 25.6 CM
ECHO MV A VELOCITY: 0.64 M/S
ECHO MV E DECELERATION TIME (DT): 179.8 MS
ECHO MV E VELOCITY: 0.78 M/S
ECHO MV E/A RATIO: 1.22
ECHO MV E/E' LATERAL: 5.91
ECHO MV E/E' RATIO (AVERAGED): 6.1
ECHO MV E/E' SEPTAL: 6.29
ECHO PV MAX VELOCITY: 0.8 M/S
ECHO PV PEAK GRADIENT: 3 MMHG
ECHO RIGHT VENTRICULAR SYSTOLIC PRESSURE (RVSP): 31 MMHG
ECHO TV REGURGITANT MAX VELOCITY: 2.42 M/S
ECHO TV REGURGITANT PEAK GRADIENT: 23 MMHG

## 2024-10-01 ENCOUNTER — OFFICE VISIT (OUTPATIENT)
Dept: CARDIOLOGY CLINIC | Age: 59
End: 2024-10-01

## 2024-10-01 VITALS
BODY MASS INDEX: 47.21 KG/M2 | OXYGEN SATURATION: 97 % | HEART RATE: 65 BPM | WEIGHT: 293 LBS | DIASTOLIC BLOOD PRESSURE: 78 MMHG | SYSTOLIC BLOOD PRESSURE: 128 MMHG

## 2024-10-01 DIAGNOSIS — R06.02 SOB (SHORTNESS OF BREATH): Primary | ICD-10-CM

## 2024-10-01 DIAGNOSIS — R07.9 CHEST PAIN, UNSPECIFIED TYPE: ICD-10-CM

## 2024-10-01 DIAGNOSIS — E78.5 HYPERLIPIDEMIA, UNSPECIFIED HYPERLIPIDEMIA TYPE: ICD-10-CM

## 2024-10-01 LAB
NUC STRESS EJECTION FRACTION: 71 %
STRESS TARGET HR: 160 BPM
TID: 1.2

## 2024-10-01 NOTE — PROGRESS NOTES
Ov Dr. Aguayo for follow up   Still sob with any activity   Has been having severe constipations   Since last visit - taking all kinds of laxatives  Barely helping       Stop Ranaxa    Stop Norvasc     Start Miralax daily     Proceed with heart cath on Oct 25 th

## 2024-10-02 NOTE — PROGRESS NOTES
Phong Hobbs MD  [facility]  [facility]  [address] [id number] [phone]        2024        Rabia De La Rosa MD  [address] [place] [address]     RE:  Sara Miller  :  1965    Dear Dr. De La Rosa:    CHIEF COMPLAINT:  Shortness of breath, chest pain, marked loss of energy, all consistent with her previous angina.    HISTORY OF PRESENT ILLNESS:  I met with Sara Miller on 2024.  I trust you did receive the full H and P on 2024.    She has developed her same symptoms of angina, which were shortness of breath; loss of energy; and chest pain, albeit atypical.    Her shortness of breath, chest pain, and loss of energy are unchanged.  She is markedly limited in her activity.    She did have a Lexiscan cardiac stress test, which showed a defect anteriorly in both stress and redistribution images either with persistent ischemia versus breast artifact.    We have placed her on full medical therapy with beta-blocker, calcium channel blocker and Ranexa, however, her symptoms persist.  Her echo did show normal LV function.    We discussed further treatment. Since her symptoms are consistent with her previous angina, even though her Lexiscan stress test was more consistent with breast artifact, I think it is reasonable to do a repeat catheterization to look at her LAD lesion.    She is in full agreement.  We will proceed on .          PASQUALE HOBBS MD      D:  10/01/2024 07:51:52     T:  10/01/2024 08:25:21     SILVIA/NELI  Job #:  [id number]     Doc#:  [id number]

## 2024-10-11 ENCOUNTER — PROCEDURE VISIT (OUTPATIENT)
Dept: CARDIOLOGY CLINIC | Age: 59
End: 2024-10-11

## 2024-10-11 DIAGNOSIS — I47.10 SVT (SUPRAVENTRICULAR TACHYCARDIA) (HCC): ICD-10-CM

## 2024-10-11 DIAGNOSIS — R07.9 CHEST PAIN, UNSPECIFIED TYPE: ICD-10-CM

## 2024-10-11 DIAGNOSIS — R06.02 SOB (SHORTNESS OF BREATH): Primary | ICD-10-CM

## 2024-10-15 ENCOUNTER — TELEPHONE (OUTPATIENT)
Dept: CARDIOLOGY CLINIC | Age: 59
End: 2024-10-15

## 2024-10-15 DIAGNOSIS — R06.02 SOB (SHORTNESS OF BREATH): ICD-10-CM

## 2024-10-15 DIAGNOSIS — I20.9 ANGINA, CLASS III (HCC): ICD-10-CM

## 2024-10-15 DIAGNOSIS — R07.9 CHEST PAIN, UNSPECIFIED TYPE: Primary | ICD-10-CM

## 2024-11-05 ENCOUNTER — TELEPHONE (OUTPATIENT)
Dept: CARDIOLOGY CLINIC | Age: 59
End: 2024-11-05

## 2024-11-05 DIAGNOSIS — R06.02 SOB (SHORTNESS OF BREATH): Primary | ICD-10-CM

## 2024-11-05 DIAGNOSIS — R07.9 CHEST PAIN, UNSPECIFIED TYPE: ICD-10-CM

## 2024-11-05 RX ORDER — FUROSEMIDE 20 MG/1
20 TABLET ORAL DAILY
Qty: 30 TABLET | Refills: 11 | Status: SHIPPED | OUTPATIENT
Start: 2024-11-05

## 2024-11-05 NOTE — TELEPHONE ENCOUNTER
Pt called. States that her feet have been swollen for 2 days. Did try icing them with little results. Please advise.

## 2024-11-20 ENCOUNTER — OFFICE VISIT (OUTPATIENT)
Dept: FAMILY MEDICINE CLINIC | Age: 59
End: 2024-11-20
Payer: COMMERCIAL

## 2024-11-20 ENCOUNTER — HOSPITAL ENCOUNTER (OUTPATIENT)
Age: 59
Discharge: HOME | End: 2024-11-20
Payer: COMMERCIAL

## 2024-11-20 VITALS
OXYGEN SATURATION: 95 % | SYSTOLIC BLOOD PRESSURE: 114 MMHG | DIASTOLIC BLOOD PRESSURE: 72 MMHG | BODY MASS INDEX: 47.35 KG/M2 | HEART RATE: 58 BPM | WEIGHT: 293 LBS

## 2024-11-20 DIAGNOSIS — E78.5 HYPERLIPIDEMIA, UNSPECIFIED HYPERLIPIDEMIA TYPE: ICD-10-CM

## 2024-11-20 DIAGNOSIS — R06.02 SOB (SHORTNESS OF BREATH): ICD-10-CM

## 2024-11-20 DIAGNOSIS — M79.622 PAIN OF LEFT UPPER ARM: ICD-10-CM

## 2024-11-20 DIAGNOSIS — M79.662 PAIN IN BOTH LOWER LEGS: Primary | ICD-10-CM

## 2024-11-20 DIAGNOSIS — R07.9 CHEST PAIN, UNSPECIFIED TYPE: ICD-10-CM

## 2024-11-20 DIAGNOSIS — M79.661 PAIN IN BOTH LOWER LEGS: Primary | ICD-10-CM

## 2024-11-20 PROBLEM — E55.9 VITAMIN D DEFICIENCY: Status: ACTIVE | Noted: 2024-11-20

## 2024-11-20 LAB
ALBUMIN SERPL-MCNC: 4.3 G/DL (ref 3.5–5.2)
ALP SERPL-CCNC: 99 U/L (ref 35–104)
ALT SERPL-CCNC: 22 U/L (ref 5–33)
ANION GAP SERPL CALCULATED.3IONS-SCNC: 12 MMOL/L (ref 9–17)
AST SERPL-CCNC: 16 U/L
BASOPHILS # BLD: 0.04 K/UL (ref 0–0.2)
BASOPHILS NFR BLD: 1 % (ref 0–2)
BILIRUB SERPL-MCNC: 0.8 MG/DL (ref 0.3–1.2)
BUN SERPL-MCNC: 14 MG/DL (ref 8–23)
BUN/CREAT SERPL: 18 (ref 9–20)
CALCIUM SERPL-MCNC: 9.5 MG/DL (ref 8.6–10.4)
CHLORIDE SERPL-SCNC: 101 MMOL/L (ref 98–107)
CHOLEST SERPL-MCNC: 125 MG/DL (ref 0–199)
CHOLESTEROL/HDL RATIO: 3.9
CO2 SERPL-SCNC: 26 MMOL/L (ref 20–31)
CREAT SERPL-MCNC: 0.8 MG/DL (ref 0.5–0.9)
EKG ATRIAL RATE: 61 BPM
EKG P AXIS: 53 DEGREES
EKG P-R INTERVAL: 162 MS
EKG Q-T INTERVAL: 446 MS
EKG QRS DURATION: 80 MS
EKG QTC CALCULATION (BAZETT): 448 MS
EKG R AXIS: 8 DEGREES
EKG T AXIS: 31 DEGREES
EKG VENTRICULAR RATE: 61 BPM
EOSINOPHIL # BLD: 0.18 K/UL (ref 0–0.4)
EOSINOPHILS RELATIVE PERCENT: 2 % (ref 0–5)
ERYTHROCYTE [DISTWIDTH] IN BLOOD BY AUTOMATED COUNT: 12.8 % (ref 12.1–15.2)
GFR, ESTIMATED: 84 ML/MIN/1.73M2
GLUCOSE SERPL-MCNC: 106 MG/DL (ref 70–99)
HCT VFR BLD AUTO: 40.7 % (ref 36–46)
HDLC SERPL-MCNC: 32 MG/DL
HGB BLD-MCNC: 13.3 G/DL (ref 12–16)
IMM GRANULOCYTES # BLD AUTO: 0.02 K/UL (ref 0–0.3)
IMM GRANULOCYTES NFR BLD: 0 % (ref 0–5)
LDLC SERPL CALC-MCNC: 53 MG/DL (ref 0–100)
LYMPHOCYTES NFR BLD: 1.9 K/UL (ref 1–4.8)
LYMPHOCYTES RELATIVE PERCENT: 23 % (ref 15–40)
MCH RBC QN AUTO: 29.1 PG (ref 26–34)
MCHC RBC AUTO-ENTMCNC: 32.7 G/DL (ref 31–37)
MCV RBC AUTO: 89.1 FL (ref 80–100)
MONOCYTES NFR BLD: 0.64 K/UL (ref 0–1)
MONOCYTES NFR BLD: 8 % (ref 4–8)
NEUTROPHILS NFR BLD: 66 % (ref 47–75)
NEUTS SEG NFR BLD: 5.64 K/UL (ref 2.5–7)
PLATELET # BLD AUTO: 344 K/UL (ref 140–450)
PMV BLD AUTO: 10.4 FL (ref 6–12)
POTASSIUM SERPL-SCNC: 4.2 MMOL/L (ref 3.7–5.3)
PROT SERPL-MCNC: 7 G/DL (ref 6.4–8.3)
RBC # BLD AUTO: 4.57 M/UL (ref 4–5.2)
SODIUM SERPL-SCNC: 139 MMOL/L (ref 135–144)
TRIGL SERPL-MCNC: 200 MG/DL
VLDLC SERPL CALC-MCNC: 40 MG/DL (ref 1–30)
WBC OTHER # BLD: 8.4 K/UL (ref 3.5–11)

## 2024-11-20 PROCEDURE — 36415 COLL VENOUS BLD VENIPUNCTURE: CPT

## 2024-11-20 PROCEDURE — 93005 ELECTROCARDIOGRAM TRACING: CPT

## 2024-11-20 PROCEDURE — 85025 COMPLETE CBC W/AUTO DIFF WBC: CPT

## 2024-11-20 PROCEDURE — 99213 OFFICE O/P EST LOW 20 MIN: CPT | Performed by: FAMILY MEDICINE

## 2024-11-20 PROCEDURE — 80061 LIPID PANEL: CPT

## 2024-11-20 PROCEDURE — 80053 COMPREHEN METABOLIC PANEL: CPT

## 2024-11-20 ASSESSMENT — ENCOUNTER SYMPTOMS
EYE DISCHARGE: 0
EYE REDNESS: 0

## 2024-11-20 NOTE — PATIENT INSTRUCTIONS
SURVEY:    You may be receiving a survey from [facility] [employer] regarding your visit today.    Please complete the survey to enable us to provide the highest quality of care to you and your family.    If you cannot score us a very good (5 Stars) on any question, please call the office to discuss how we could have made your experience a very good one.    Thank you.    Clinical Care Team: MD Tyrel Bhagat LPN              Triage: Yolanda Gregorio CMA              Clerical Team: Yolanda Avila

## 2024-11-20 NOTE — PROGRESS NOTES
HPI Notes    Name: Sara Miller  : 1965        Chief Complaint:     Chief Complaint   Patient presents with    Leg Pain     Patient complains of bilateral leg pain. Chronic leg pain    Shoulder Pain     Patient complains of left shoulder pain, upper left arm. Chronic pain        History of Present Illness:     Sara iMller is a 59 y.o.  female who presents with Leg Pain (Patient complains of bilateral leg pain. Chronic leg pain) and Shoulder Pain (Patient complains of left shoulder pain, upper left arm. Chronic pain )      Leg Pain   There was no injury mechanism (pt complains of leg pain for years but getting worse.  Pt states pain is WORSE with walking and standing). The quality of the pain is described as aching. The pain is moderate. The pain has been Constant (pain is not in the knees --- it is just pain in the lower legs mainly. It makes pt take little steps instead of bigger strides) since onset. Pertinent negatives include no inability to bear weight, muscle weakness, numbness or tingling. The symptoms are aggravated by weight bearing. She has tried NSAIDs (muscle rubs don't help. legs swell but on water pill now. Pt has tried the compression hose and feel great at first but after few  hours wearing then feels too tight) for the symptoms. The treatment provided mild relief.   Shoulder Pain   The pain is present in the left shoulder. This is a new problem. The current episode started more than 1 month ago (Lt shoulder/upper arm pain for \"months\"). There has been no history of extremity trauma. The problem occurs daily. The problem has been unchanged. The quality of the pain is described as aching (constant achiness and pt sleeps on that Lt shoulder as putting the \"pressure\" on it helps it.). The pain is moderate. Pertinent negatives include no fever, inability to bear weight, joint locking, joint swelling, limited range of motion, numbness, stiffness or tingling. She has tried NSAIDS for the

## 2024-11-22 DIAGNOSIS — M79.671 RIGHT FOOT PAIN: ICD-10-CM

## 2024-11-22 RX ORDER — MELOXICAM 15 MG/1
15 TABLET ORAL DAILY
Qty: 30 TABLET | Refills: 5 | Status: SHIPPED | OUTPATIENT
Start: 2024-11-22

## 2024-11-22 NOTE — TELEPHONE ENCOUNTER
Last visit:  11/20/2024  Next Visit Date:    Future Appointments   Date Time Provider Department Center   12/3/2024  7:30 AM Antony Aguayo MD [facility]         Medication List:  Prior to Admission medications    Medication Sig Start Date End Date Taking? Authorizing Provider   furosemide (LASIX) 20 MG tablet Take 1 tablet by mouth daily 11/5/24   Antony Aguayo MD   metoprolol tartrate (LOPRESSOR) 25 MG tablet TAKE 1 TABLET BY MOUTH TWICE A DAY 9/16/24   Antony Aguayo MD   lisinopril (PRINIVIL;ZESTRIL) 5 MG tablet TAKE 1 TABLET BY MOUTH EVERY DAY 9/16/24   Antony Aguayo MD   pantoprazole (PROTONIX) 40 MG tablet Take 1 tablet by mouth daily 9/11/24   Antony Aguayo MD   famotidine (PEPCID) 20 MG tablet Take 1 tablet by mouth in the morning and at bedtime 9/11/24   Antony Aguayo MD   rosuvastatin (CRESTOR) 20 MG tablet Take 1 tablet by mouth daily 9/11/24   Antony Aguayo MD   ASPIRIN LOW DOSE 81 MG EC tablet TAKE 1 TABLET BY MOUTH EVERY DAY 8/8/24   Antony Aguayo MD   fluticasone furoate-vilanterol (BREO ELLIPTA) 100-25 MCG/ACT inhaler Inhale 1 puff into the lungs daily 7/2/24   Rabia De La Rosa MD   meloxicam (MOBIC) 15 MG tablet TAKE 1 TABLET BY MOUTH EVERY DAY 5/28/24   Rabia De La Rosa MD   flecainide (TAMBOCOR) 150 MG tablet TAKE 1 TABLET BY MOUTH TWICE A DAY 4/1/24   Antony Aguayo MD   albuterol sulfate HFA (VENTOLIN HFA) 108 (90 Base) MCG/ACT inhaler Inhale 2 puffs into the lungs 2 times daily as needed for Wheezing 12/28/23   Antony Aguayo MD   eplerenone (INSPRA) 50 MG tablet TAKE 1 TABLET BY MOUTH TWICE A DAY 12/21/23   Antony Aguayo MD   clopidogrel (PLAVIX) 75 MG tablet Take 1 tablet by mouth daily 12/21/23   Antony Aguayo MD

## 2024-12-03 ENCOUNTER — OFFICE VISIT (OUTPATIENT)
Dept: CARDIOLOGY CLINIC | Age: 59
End: 2024-12-03
Payer: COMMERCIAL

## 2024-12-03 VITALS
WEIGHT: 293 LBS | SYSTOLIC BLOOD PRESSURE: 122 MMHG | OXYGEN SATURATION: 95 % | DIASTOLIC BLOOD PRESSURE: 60 MMHG | HEART RATE: 73 BPM | BODY MASS INDEX: 47.92 KG/M2

## 2024-12-03 DIAGNOSIS — I25.10 CORONARY ARTERY DISEASE INVOLVING NATIVE CORONARY ARTERY, UNSPECIFIED WHETHER ANGINA PRESENT, UNSPECIFIED WHETHER NATIVE OR TRANSPLANTED HEART: Primary | ICD-10-CM

## 2024-12-03 PROCEDURE — 99214 OFFICE O/P EST MOD 30 MIN: CPT | Performed by: INTERNAL MEDICINE

## 2024-12-03 NOTE — PATIENT INSTRUCTIONS
HOLD Crestor     Call in 4 weeks with update on legs  - may try a new cholesterol med     Follow up in 3 months- no testing

## 2024-12-04 NOTE — PROGRESS NOTES
Ov Dr. Aguayo for follow up   Sob is better   No chest pain   Edema is better  C/o leg pain/back pain             HOLD Crestor     Call in 4 weeks with update on legs  - may try a new cholesterol med     Follow up in 3 months- no testing       
S4.  Soft systolic murmur.  No diastolic murmur.  PMI was normal.  No lift, thrust, or pericardial friction rub.  LUNGS:  Clear to auscultation and percussion.  ABDOMEN:  Soft.  Good bowel sounds.  EXTREMITIES:  Good femoral pulses. Good pedal pulses.  Mild pedal edema.  Skin is warm and dry.  No calf tenderness.  Nail beds pink.  Good cap refill.  PULSES:  Bilateral symmetrical radial, brachial, and carotid pulses.  No carotid bruits.  Good femoral and pedal pulses.  NEUROLOGIC:  Within normal limits.  PSYCHIATRIC:  Within normal limits.    LABORATORY DATA:  Sodium is 139, potassium 4.2, BUN 14, creatinine 0.8, GFR is 84, calcium is 9.5. Cholesterol 125, HDL 32, LDL 53, triglycerides 200.  Her ALT was 22, AST was 16.  Glucose was 106.  Her white count was 8.4, hemoglobin 13.3 with a platelet count 344,000.    Again, her EKG showed sinus rhythm, normal EKG with a QTc interval of 448 milliseconds.    IMPRESSION:    Arthralgias, possibly secondary from Crestor 20 mg daily.   Coronary artery disease.  Catheterization on September 22, 2023, that showed 65% disease in the left anterior descending beyond the diagonal with iFR of 0.93 with a normal circumflex and right coronary artery, ejection fraction 60%.   Shortness of breath, on full medical therapy with subsequent catheterization on February 9, 2024, that showed 80% disease in the left anterior descending, which was stented with a 3.8 x 28 mm Synergy stent.    Last catheterization on October 11, 2024, that showed normal right coronary artery, which was large and dominant; 60% disease in the AV branch of the circumflex with an iFR of 0.98 well above the threshold of 0.90, indicating no significant obstruction with widely patent stent in the LAD and 80% ostial diagonal which arose from within the stent and therefore was jailed with an EF of 60% with doing angioplasty alone of the ostium of the diagonal through the stent struts with good end result.  Much improvement in

## 2024-12-10 RX ORDER — EPLERENONE 50 MG/1
TABLET, FILM COATED ORAL
Qty: 180 TABLET | Refills: 3 | Status: SHIPPED | OUTPATIENT
Start: 2024-12-10

## 2024-12-10 RX ORDER — CLOPIDOGREL BISULFATE 75 MG/1
75 TABLET ORAL DAILY
Qty: 90 TABLET | Refills: 3 | Status: SHIPPED | OUTPATIENT
Start: 2024-12-10

## 2024-12-20 RX ORDER — FLUTICASONE FUROATE AND VILANTEROL TRIFENATATE 100; 25 UG/1; UG/1
POWDER RESPIRATORY (INHALATION)
Qty: 60 EACH | Refills: 5 | Status: SHIPPED | OUTPATIENT
Start: 2024-12-20

## 2024-12-20 NOTE — TELEPHONE ENCOUNTER
Last visit:  11/20/2024  Next Visit Date:    Future Appointments   Date Time Provider Department Center   3/27/2025  7:30 AM Antony Aguayo MD [facility]         Medication List:  Prior to Admission medications    Medication Sig Start Date End Date Taking? Authorizing Provider   eplerenone (INSPRA) 50 MG tablet TAKE 1 TABLET BY MOUTH TWICE A DAY 12/10/24   Antony Aguayo MD   clopidogrel (PLAVIX) 75 MG tablet TAKE 1 TABLET BY MOUTH EVERY DAY 12/10/24   Antony Aguayo MD   meloxicam (MOBIC) 15 MG tablet TAKE 1 TABLET BY MOUTH EVERY DAY 11/22/24   Rabia De La Rosa MD   furosemide (LASIX) 20 MG tablet Take 1 tablet by mouth daily 11/5/24   Antony Aguayo MD   metoprolol tartrate (LOPRESSOR) 25 MG tablet TAKE 1 TABLET BY MOUTH TWICE A DAY 9/16/24   Antony Aguayo MD   lisinopril (PRINIVIL;ZESTRIL) 5 MG tablet TAKE 1 TABLET BY MOUTH EVERY DAY 9/16/24   Antony Aguayo MD   pantoprazole (PROTONIX) 40 MG tablet Take 1 tablet by mouth daily 9/11/24   Antony Aguayo MD   famotidine (PEPCID) 20 MG tablet Take 1 tablet by mouth in the morning and at bedtime 9/11/24   Antony Aguayo MD   rosuvastatin (CRESTOR) 20 MG tablet Take 1 tablet by mouth daily 9/11/24   Antony Aguayo MD   ASPIRIN LOW DOSE 81 MG EC tablet TAKE 1 TABLET BY MOUTH EVERY DAY 8/8/24   Antony Aguayo MD   fluticasone furoate-vilanterol (BREO ELLIPTA) 100-25 MCG/ACT inhaler Inhale 1 puff into the lungs daily 7/2/24   Rabia De La Rosa MD   albuterol sulfate HFA (VENTOLIN HFA) 108 (90 Base) MCG/ACT inhaler Inhale 2 puffs into the lungs 2 times daily as needed for Wheezing 12/28/23   Antony Aguayo MD

## 2025-01-28 ENCOUNTER — OFFICE VISIT (OUTPATIENT)
Dept: FAMILY MEDICINE CLINIC | Age: 60
End: 2025-01-28
Payer: COMMERCIAL

## 2025-01-28 VITALS — DIASTOLIC BLOOD PRESSURE: 78 MMHG | HEART RATE: 74 BPM | SYSTOLIC BLOOD PRESSURE: 120 MMHG

## 2025-01-28 DIAGNOSIS — Z76.89 ENCOUNTER FOR WEIGHT MANAGEMENT: ICD-10-CM

## 2025-01-28 DIAGNOSIS — M79.661 RIGHT CALF PAIN: Primary | ICD-10-CM

## 2025-01-28 PROCEDURE — 99213 OFFICE O/P EST LOW 20 MIN: CPT | Performed by: FAMILY MEDICINE

## 2025-01-28 SDOH — ECONOMIC STABILITY: FOOD INSECURITY: WITHIN THE PAST 12 MONTHS, YOU WORRIED THAT YOUR FOOD WOULD RUN OUT BEFORE YOU GOT MONEY TO BUY MORE.: NEVER TRUE

## 2025-01-28 SDOH — ECONOMIC STABILITY: FOOD INSECURITY: WITHIN THE PAST 12 MONTHS, THE FOOD YOU BOUGHT JUST DIDN'T LAST AND YOU DIDN'T HAVE MONEY TO GET MORE.: NEVER TRUE

## 2025-01-28 ASSESSMENT — PATIENT HEALTH QUESTIONNAIRE - PHQ9
SUM OF ALL RESPONSES TO PHQ QUESTIONS 1-9: 0
SUM OF ALL RESPONSES TO PHQ QUESTIONS 1-9: 0
SUM OF ALL RESPONSES TO PHQ9 QUESTIONS 1 & 2: 0
SUM OF ALL RESPONSES TO PHQ QUESTIONS 1-9: 0
1. LITTLE INTEREST OR PLEASURE IN DOING THINGS: NOT AT ALL
SUM OF ALL RESPONSES TO PHQ QUESTIONS 1-9: 0
2. FEELING DOWN, DEPRESSED OR HOPELESS: NOT AT ALL

## 2025-01-28 NOTE — PROGRESS NOTES
HPI Notes    Name: Sara Miller  : 1965        Chief Complaint:     Chief Complaint   Patient presents with    Leg Pain     Patient here today with right lower leg pain, pulled muscle getting into her 's truck.        History of Present Illness:     Sara Miller is a 59 y.o.  female who presents with Leg Pain (Patient here today with right lower leg pain, pulled muscle getting into her 's truck. )      Leg Pain   Injury mechanism: yesterday she was getting into her 's big  truck and pushed off and jumped with the Rt leg as no runnin board on the truck. Pt felt sharp pull in the calf of her Rt leg. No black or blue and NO swelling or heat. Pain location: Rt calf muscle. The quality of the pain is described as aching. The pain has been Improving (pt states it is better today and she is walking on it but not proper way.) since onset. The symptoms are aggravated by movement and weight bearing. She has tried ice and NSAIDs (she took the Motrin last night) for the symptoms. The treatment provided mild relief.     Weight loss - pt states she is very frustrated with her weight. Pt has been not feeling well in her joints and such just always feel achy. Pt has been having a work up with her cardiologist. Pt has even stopped her STATIN medication and really didn't help the achiness. Pt has not found anything new with her heart. So pt wondering if it is due to her being so over weight. Pt states her Cardiologist gave her info on [facility] about trying the Semiglutide through them. Pt brought in the paper.     Past Medical History:     Past Medical History:   Diagnosis Date    Bell's palsy 2013    Carpal tunnel syndrome     Chicken pox     Hydronephrosis 2009    left     Hyperlipidemia 2013    Myalgia 2022    Pneumonia     Renal atrophy     Sleep apnea     SVT (supraventricular tachycardia) (HCC) 2022    UTI (urinary tract infection)       Reviewed all

## 2025-02-05 NOTE — TELEPHONE ENCOUNTER
Name of Medication(s) Requested:  Requested Prescriptions     Pending Prescriptions Disp Refills    buPROPion (WELLBUTRIN XL) 300 MG extended release tablet [Pharmacy Med Name: BUPROPION HCL XL TABS 300MG] 90 tablet 1     Sig: TAKE 1 TABLET DAILY       Medication is on current medication list Yes    Dosage and directions were verified? Yes    Quantity verified: 90 day supply     Pharmacy Verified?  Yes    Last Appointment:  12/10/2024    Future appts:  Future Appointments   Date Time Provider Department Center   3/13/2025  8:45 AM Morteza Palomino APRN - CNP [facility]        (If no appt send self scheduling link. .REFILLAPPT)  Scheduling request sent?     [] Yes  [x] No    Does patient need updated?  [] Yes  [x] No   Pt called back and stated that it wasn't the 8/4 service date that needs recoded but the 7/13/22 date needs to be a wellness if willing. She needs this before 9/01/22 . Thanks again!

## 2025-02-18 ENCOUNTER — TELEPHONE (OUTPATIENT)
Dept: FAMILY MEDICINE CLINIC | Age: 60
End: 2025-02-18

## 2025-02-18 NOTE — TELEPHONE ENCOUNTER
Semaglutide    [facility]    Health Maintenance   Topic Date Due    HIV screen  Never done    DTaP/Tdap/Td vaccine (1 - Tdap) Never done    Shingles vaccine (1 of 2) Never done    Pneumococcal 50+ years Vaccine (1 of 1 - PCV) Never done    Respiratory Syncytial Virus (RSV) Pregnant or age 60 yrs+ (1 - Risk 60-74 years 1-dose series) Never done    Flu vaccine (1) 08/01/2024    COVID-19 Vaccine (3 - 2024-25 season) 09/01/2024    Breast cancer screen  08/30/2025    Diabetes screen  09/26/2025    Lipids  11/20/2025    Depression Screen  01/28/2026    Colorectal Cancer Screen  09/13/2031    Hepatitis C screen  Completed    Hepatitis A vaccine  Aged Out    Hepatitis B vaccine  Aged Out    Hib vaccine  Aged Out    Polio vaccine  Aged Out    Meningococcal (ACWY) vaccine  Aged Out    A1C test (Diabetic or Prediabetic)  Discontinued             (applicable per patient's age: Cancer Screenings, Depression Screening, Fall Risk Screening, Immunizations)    Hemoglobin A1C (%)   Date Value   09/26/2022 5.6   07/29/2021 6.0   09/12/2018 5.7     AST (U/L)   Date Value   11/20/2024 16     ALT (U/L)   Date Value   11/20/2024 22     BUN (mg/dL)   Date Value   11/20/2024 14      (goal A1C is < 7)   (goal LDL is <100) need 30-50% reduction from baseline     BP Readings from Last 3 Encounters:   01/28/25 120/78   12/03/24 122/60   11/20/24 114/72    (goal /80)      All Future Testing planned in [facility]:  Lab Frequency Next Occurrence   EKG 12 Lead Once 09/10/2024   Vascular ankle brachial index (JUVENTINO) Once 11/20/2024       Next Visit Date:  Future Appointments   Date Time Provider Department Center   3/27/2025  7:30 AM Antony Aguayo MD [facility]            Patient Active Problem List:     Bell's palsy     Frequent UTI     Ureterocele     ITZ (obstructive sleep apnea)     Urinary tract infection due to ESBL Klebsiella     Myalgia     SVT (supraventricular tachycardia) (HCC)     Ectopic ureter     Coronary

## 2025-03-10 RX ORDER — LISINOPRIL 5 MG/1
5 TABLET ORAL DAILY
Qty: 90 TABLET | Refills: 1 | Status: SHIPPED | OUTPATIENT
Start: 2025-03-10

## 2025-03-27 ENCOUNTER — OFFICE VISIT (OUTPATIENT)
Dept: CARDIOLOGY CLINIC | Age: 60
End: 2025-03-27

## 2025-03-27 ENCOUNTER — HOSPITAL ENCOUNTER (OUTPATIENT)
Age: 60
Discharge: HOME | End: 2025-03-27
Payer: COMMERCIAL

## 2025-03-27 ENCOUNTER — OFFICE VISIT (OUTPATIENT)
Dept: FAMILY MEDICINE CLINIC | Age: 60
End: 2025-03-27
Payer: COMMERCIAL

## 2025-03-27 VITALS
SYSTOLIC BLOOD PRESSURE: 120 MMHG | HEART RATE: 74 BPM | WEIGHT: 293 LBS | OXYGEN SATURATION: 98 % | DIASTOLIC BLOOD PRESSURE: 72 MMHG | BODY MASS INDEX: 45.2 KG/M2

## 2025-03-27 VITALS — SYSTOLIC BLOOD PRESSURE: 123 MMHG | DIASTOLIC BLOOD PRESSURE: 68 MMHG | HEART RATE: 70 BPM | OXYGEN SATURATION: 98 %

## 2025-03-27 DIAGNOSIS — I25.10 CORONARY ARTERIOSCLEROSIS IN NATIVE ARTERY: ICD-10-CM

## 2025-03-27 DIAGNOSIS — I47.10 SVT (SUPRAVENTRICULAR TACHYCARDIA): Primary | ICD-10-CM

## 2025-03-27 DIAGNOSIS — G47.33 OSA (OBSTRUCTIVE SLEEP APNEA): ICD-10-CM

## 2025-03-27 DIAGNOSIS — Z76.89 ENCOUNTER FOR WEIGHT MANAGEMENT: Primary | ICD-10-CM

## 2025-03-27 DIAGNOSIS — E78.5 HYPERLIPIDEMIA, UNSPECIFIED HYPERLIPIDEMIA TYPE: Primary | ICD-10-CM

## 2025-03-27 DIAGNOSIS — E55.9 VITAMIN D DEFICIENCY: ICD-10-CM

## 2025-03-27 DIAGNOSIS — I47.10 SVT (SUPRAVENTRICULAR TACHYCARDIA): ICD-10-CM

## 2025-03-27 LAB
CHOLEST SERPL-MCNC: 200 MG/DL (ref 0–199)
CHOLESTEROL/HDL RATIO: 6.9
HDLC SERPL-MCNC: 29 MG/DL
LDLC SERPL CALC-MCNC: 132 MG/DL (ref 0–100)
TRIGL SERPL-MCNC: 194 MG/DL
VLDLC SERPL CALC-MCNC: 39 MG/DL (ref 1–30)

## 2025-03-27 PROCEDURE — 36415 COLL VENOUS BLD VENIPUNCTURE: CPT

## 2025-03-27 PROCEDURE — 99213 OFFICE O/P EST LOW 20 MIN: CPT | Performed by: FAMILY MEDICINE

## 2025-03-27 PROCEDURE — 80061 LIPID PANEL: CPT

## 2025-03-27 RX ORDER — LOVASTATIN 10 MG/1
10 TABLET ORAL NIGHTLY
Qty: 30 TABLET | Refills: 11 | Status: SHIPPED | OUTPATIENT
Start: 2025-03-27

## 2025-03-27 ASSESSMENT — ENCOUNTER SYMPTOMS
EYE DISCHARGE: 0
EYE REDNESS: 0
COUGH: 0
CHANGE IN BOWEL HABIT: 0
VOMITING: 0
DIARRHEA: 0
SHORTNESS OF BREATH: 0
NAUSEA: 0
ABDOMINAL PAIN: 0
CONSTIPATION: 0

## 2025-03-27 NOTE — PATIENT INSTRUCTIONS
Will add Mevacor 10 mg one nightly     Will get Lipids today     Will call in 2 weeks with update on starting   Mevacor     Follow up in 3 months with testing

## 2025-03-27 NOTE — PROGRESS NOTES
AM    BUN 14 11/20/2024 09:25 AM    CREATININE 0.8 11/20/2024 09:25 AM    GLUCOSE 106 11/20/2024 09:25 AM    BILITOT 0.8 11/20/2024 09:25 AM    ALKPHOS 99 11/20/2024 09:25 AM    AST 16 11/20/2024 09:25 AM    ALT 22 11/20/2024 09:25 AM     Lab Results   Component Value Date/Time    WBC 8.4 11/20/2024 09:25 AM    RBC 4.57 11/20/2024 09:25 AM    HGB 13.3 11/20/2024 09:25 AM    HCT 40.7 11/20/2024 09:25 AM    MCV 89.1 11/20/2024 09:25 AM    MCH 29.1 11/20/2024 09:25 AM    MCHC 32.7 11/20/2024 09:25 AM    RDW 12.8 11/20/2024 09:25 AM     11/20/2024 09:25 AM    MPV 10.4 11/20/2024 09:25 AM     Lab Results   Component Value Date/Time    TSH 1.90 09/10/2024 07:03 AM     Lab Results   Component Value Date/Time    CHOL 125 11/20/2024 09:25 AM    LDL 53 11/20/2024 09:25 AM    LDL 92 02/03/2014 12:00 AM    HDL 32 11/20/2024 09:25 AM    LABA1C 5.6 09/26/2022 03:53 PM          Assessment/Plan:        1. Encounter for weight management  Pt is doing well on the Semaglutide 1.2mg injected once a week and improved diet and exercise.  Markel in 3mos      Return in about 3 months (around 6/27/2025) for wt management.      Electronically signed by Rabia De LaR osa MD on 3/27/2025 at 8:46 AM

## 2025-03-27 NOTE — PROGRESS NOTES
Ov Dr. Aguayo for 1 month f/u   No chest pain         Will add Mevacor 10 mg one nightly     Will get Lipids today     Will call in 2 weeks with update on starting   Mevacor     Follow up in 3 months with testing

## 2025-05-01 ENCOUNTER — TELEPHONE (OUTPATIENT)
Dept: FAMILY MEDICINE CLINIC | Age: 60
End: 2025-05-01

## 2025-05-01 NOTE — TELEPHONE ENCOUNTER
Semaglutide    [facility]    Last visit 3/27/25    Health Maintenance   Topic Date Due    HIV screen  Never done    DTaP/Tdap/Td vaccine (1 - Tdap) Never done    Shingles vaccine (1 of 2) Never done    Pneumococcal 50+ years Vaccine (1 of 1 - PCV) Never done    Respiratory Syncytial Virus (RSV) Pregnant or age 60 yrs+ (1 - Risk 60-74 years 1-dose series) Never done    COVID-19 Vaccine (3 - 2024-25 season) 09/01/2024    Flu vaccine (Season Ended) 08/01/2025    Breast cancer screen  08/30/2025    Diabetes screen  09/26/2025    Depression Screen  01/28/2026    Lipids  03/27/2026    Colorectal Cancer Screen  09/13/2031    Hepatitis C screen  Completed    Hepatitis A vaccine  Aged Out    Hepatitis B vaccine  Aged Out    Hib vaccine  Aged Out    Polio vaccine  Aged Out    Meningococcal (ACWY) vaccine  Aged Out    Meningococcal B vaccine  Aged Out    A1C test (Diabetic or Prediabetic)  Discontinued             (applicable per patient's age: Cancer Screenings, Depression Screening, Fall Risk Screening, Immunizations)    Hemoglobin A1C (%)   Date Value   09/26/2022 5.6   07/29/2021 6.0   09/12/2018 5.7     AST (U/L)   Date Value   11/20/2024 16     ALT (U/L)   Date Value   11/20/2024 22     BUN (mg/dL)   Date Value   11/20/2024 14      (goal A1C is < 7)   (goal LDL is <100) need 30-50% reduction from baseline     BP Readings from Last 3 Encounters:   03/27/25 120/72   03/27/25 123/68   01/28/25 120/78    (goal /80)      All Future Testing planned in [facility]:  Lab Frequency Next Occurrence   Vascular ankle brachial index (JUVENTINO) Once 11/20/2024   CBC with Auto Differential Once 06/16/2025   Comprehensive Metabolic Panel Once 06/16/2025   Lipid Panel Once 06/16/2025   Magnesium Once 06/16/2025   Vitamin D 25 Hydroxy Once 06/16/2025   EKG 12 Lead Once 06/16/2025   XR CHEST (2 VW) Once 03/27/2025       Next Visit Date:  Future Appointments   Date Time Provider Department Center   6/26/2025  7:30 AM [facility]

## 2025-05-17 DIAGNOSIS — M79.671 RIGHT FOOT PAIN: Primary | ICD-10-CM

## 2025-05-19 RX ORDER — MELOXICAM 15 MG/1
15 TABLET ORAL DAILY
Qty: 30 TABLET | Refills: 5 | Status: SHIPPED | OUTPATIENT
Start: 2025-05-19 | End: 2025-11-15

## 2025-05-19 NOTE — TELEPHONE ENCOUNTER
Last OV: 3/27/2025    Next scheduled apt: Visit date not found        Surescripts requesting refill  Medication pending

## 2025-06-10 RX ORDER — SALICYLIC ACID 40 %
81 ADHESIVE PATCH, MEDICATED TOPICAL DAILY
Qty: 90 TABLET | Refills: 3 | Status: SHIPPED | OUTPATIENT
Start: 2025-06-10

## 2025-07-10 ENCOUNTER — OFFICE VISIT (OUTPATIENT)
Dept: FAMILY MEDICINE CLINIC | Age: 60
End: 2025-07-10
Payer: COMMERCIAL

## 2025-07-10 ENCOUNTER — OFFICE VISIT (OUTPATIENT)
Dept: CARDIOLOGY CLINIC | Age: 60
End: 2025-07-10
Payer: COMMERCIAL

## 2025-07-10 ENCOUNTER — HOSPITAL ENCOUNTER (OUTPATIENT)
Dept: NON INVASIVE DIAGNOSTICS | Age: 60
Discharge: HOME | End: 2025-07-10
Payer: COMMERCIAL

## 2025-07-10 ENCOUNTER — HOSPITAL ENCOUNTER (OUTPATIENT)
Age: 60
Discharge: HOME | End: 2025-07-10
Payer: COMMERCIAL

## 2025-07-10 VITALS
OXYGEN SATURATION: 97 % | SYSTOLIC BLOOD PRESSURE: 122 MMHG | HEART RATE: 70 BPM | WEIGHT: 293 LBS | HEIGHT: 70 IN | BODY MASS INDEX: 41.95 KG/M2 | DIASTOLIC BLOOD PRESSURE: 74 MMHG

## 2025-07-10 DIAGNOSIS — E78.5 HYPERLIPIDEMIA, UNSPECIFIED HYPERLIPIDEMIA TYPE: Primary | ICD-10-CM

## 2025-07-10 DIAGNOSIS — I25.10 CORONARY ARTERIOSCLEROSIS IN NATIVE ARTERY: ICD-10-CM

## 2025-07-10 DIAGNOSIS — Z00.00 ANNUAL PHYSICAL EXAM: Primary | ICD-10-CM

## 2025-07-10 DIAGNOSIS — R06.02 SOB (SHORTNESS OF BREATH): ICD-10-CM

## 2025-07-10 DIAGNOSIS — I25.10 CORONARY ARTERY DISEASE INVOLVING NATIVE CORONARY ARTERY, UNSPECIFIED WHETHER ANGINA PRESENT, UNSPECIFIED WHETHER NATIVE OR TRANSPLANTED HEART: ICD-10-CM

## 2025-07-10 DIAGNOSIS — E55.9 VITAMIN D DEFICIENCY: ICD-10-CM

## 2025-07-10 DIAGNOSIS — I47.10 SVT (SUPRAVENTRICULAR TACHYCARDIA): ICD-10-CM

## 2025-07-10 DIAGNOSIS — G47.33 OSA (OBSTRUCTIVE SLEEP APNEA): ICD-10-CM

## 2025-07-10 DIAGNOSIS — Z12.31 BREAST CANCER SCREENING BY MAMMOGRAM: ICD-10-CM

## 2025-07-10 LAB
25(OH)D3 SERPL-MCNC: 25.8 NG/ML (ref 30–100)
ALBUMIN SERPL-MCNC: 4.3 G/DL (ref 3.5–5.2)
ALBUMIN/GLOB SERPL: 1.7 {RATIO} (ref 1–2.5)
ALP SERPL-CCNC: 90 U/L (ref 35–104)
ALT SERPL-CCNC: 25 U/L (ref 5–33)
ANION GAP SERPL CALCULATED.3IONS-SCNC: 12 MMOL/L (ref 9–17)
AST SERPL-CCNC: 18 U/L
BASOPHILS # BLD: 0.05 K/UL (ref 0–0.2)
BASOPHILS NFR BLD: 1 % (ref 0–2)
BILIRUB SERPL-MCNC: 0.8 MG/DL (ref 0.3–1.2)
BUN SERPL-MCNC: 17 MG/DL (ref 8–23)
CALCIUM SERPL-MCNC: 9.6 MG/DL (ref 8.6–10.4)
CHLORIDE SERPL-SCNC: 105 MMOL/L (ref 98–107)
CHOLEST SERPL-MCNC: 214 MG/DL (ref 0–199)
CHOLESTEROL/HDL RATIO: 6.7
CO2 SERPL-SCNC: 22 MMOL/L (ref 20–31)
CREAT SERPL-MCNC: 0.9 MG/DL (ref 0.5–0.9)
EOSINOPHIL # BLD: 0.33 K/UL (ref 0–0.4)
EOSINOPHILS RELATIVE PERCENT: 4 % (ref 0–5)
ERYTHROCYTE [DISTWIDTH] IN BLOOD BY AUTOMATED COUNT: 12.9 % (ref 12.1–15.2)
GFR, ESTIMATED: 73 ML/MIN/1.73M2
GLUCOSE SERPL-MCNC: 105 MG/DL (ref 70–99)
HCT VFR BLD AUTO: 41.8 % (ref 36–46)
HDLC SERPL-MCNC: 32 MG/DL
HGB BLD-MCNC: 13.5 G/DL (ref 12–16)
IMM GRANULOCYTES # BLD AUTO: 0.01 K/UL (ref 0–0.3)
IMM GRANULOCYTES NFR BLD: 0 % (ref 0–5)
LDLC SERPL CALC-MCNC: 122 MG/DL (ref 0–100)
LYMPHOCYTES NFR BLD: 2.19 K/UL (ref 1–4.8)
LYMPHOCYTES RELATIVE PERCENT: 26 % (ref 15–40)
MAGNESIUM SERPL-MCNC: 2 MG/DL (ref 1.6–2.6)
MCH RBC QN AUTO: 28.6 PG (ref 26–34)
MCHC RBC AUTO-ENTMCNC: 32.3 G/DL (ref 31–37)
MCV RBC AUTO: 88.6 FL (ref 80–100)
MONOCYTES NFR BLD: 0.67 K/UL (ref 0–1)
MONOCYTES NFR BLD: 8 % (ref 4–8)
NEUTROPHILS NFR BLD: 61 % (ref 47–75)
NEUTS SEG NFR BLD: 5.1 K/UL (ref 2.5–7)
PLATELET # BLD AUTO: 342 K/UL (ref 140–450)
PMV BLD AUTO: 10.3 FL (ref 6–12)
POTASSIUM SERPL-SCNC: 4.2 MMOL/L (ref 3.7–5.3)
PROT SERPL-MCNC: 6.9 G/DL (ref 6.4–8.3)
RBC # BLD AUTO: 4.72 M/UL (ref 4–5.2)
SODIUM SERPL-SCNC: 139 MMOL/L (ref 135–144)
TRIGL SERPL-MCNC: 298 MG/DL
VLDLC SERPL CALC-MCNC: 60 MG/DL (ref 1–30)
WBC OTHER # BLD: 8.4 K/UL (ref 3.5–11)

## 2025-07-10 PROCEDURE — 36415 COLL VENOUS BLD VENIPUNCTURE: CPT

## 2025-07-10 PROCEDURE — 80053 COMPREHEN METABOLIC PANEL: CPT

## 2025-07-10 PROCEDURE — 85025 COMPLETE CBC W/AUTO DIFF WBC: CPT

## 2025-07-10 PROCEDURE — 80061 LIPID PANEL: CPT

## 2025-07-10 PROCEDURE — 83735 ASSAY OF MAGNESIUM: CPT

## 2025-07-10 PROCEDURE — 99396 PREV VISIT EST AGE 40-64: CPT | Performed by: FAMILY MEDICINE

## 2025-07-10 PROCEDURE — 99214 OFFICE O/P EST MOD 30 MIN: CPT | Performed by: INTERNAL MEDICINE

## 2025-07-10 PROCEDURE — 82306 VITAMIN D 25 HYDROXY: CPT

## 2025-07-10 RX ORDER — EZETIMIBE 10 MG/1
10 TABLET ORAL DAILY
Qty: 30 TABLET | Refills: 11 | Status: SHIPPED | OUTPATIENT
Start: 2025-07-10

## 2025-07-10 ASSESSMENT — ENCOUNTER SYMPTOMS
VOMITING: 0
BLOOD IN STOOL: 0
EYE REDNESS: 0
COUGH: 0
FACIAL SWELLING: 0
DIARRHEA: 0
NAUSEA: 0
ABDOMINAL PAIN: 0
EYE DISCHARGE: 0
SHORTNESS OF BREATH: 0

## 2025-07-10 NOTE — PROGRESS NOTES
Right leg from back    Muscle pain - stopped mevacor    Bus trip    Zetia   repeat cholesterol 3 months.

## 2025-07-10 NOTE — PROGRESS NOTES
HPI Notes    Name: Sara Miller  : 1965        Chief Complaint:     Chief Complaint   Patient presents with    Annual Exam     Patient here today for annual physical for insurance through employer       History of Present Illness:     Sara Miller is a 60 y.o.  female who presents with Annual Exam (Patient here today for annual physical for insurance through employer)      HPI  Annual exam -  Pt is here for annual work and insurance ck up. Pt is overall doing well and just had labs and saw cardiologist today  Past Medical History:     Past Medical History:   Diagnosis Date    Bell's palsy 2013    Carpal tunnel syndrome     Chicken pox     Hydronephrosis 2009    left     Hyperlipidemia 2013    Myalgia 2022    Pneumonia     Renal atrophy     Sleep apnea     SVT (supraventricular tachycardia) 2022    UTI (urinary tract infection)       Reviewed all health maintenance requirements and ordered appropriate tests  Health Maintenance Due   Topic Date Due    HIV screen  Never done    DTaP/Tdap/Td vaccine (1 - Tdap) Never done    Shingles vaccine (1 of 2) Never done    Pneumococcal 50+ years Vaccine (1 of 1 - PCV) Never done    Respiratory Syncytial Virus (RSV) Pregnant or age 60 yrs+ (1 - Risk 60-74 years 1-dose series) Never done    COVID-19 Vaccine (3 - - season) 2024       Past Surgical History:     Past Surgical History:   Procedure Laterality Date     SECTION      COLONOSCOPY N/A 2021    COLONOSCOPY performed by Brain Jeronimo MD at [facility] ENDOSCOPY    CYSTOSCOPY  2009    CYSTOSCOPY Left 2022    CYSTOSCOPY performed by Selwyn Farrar MD at [facility] OR    CYSTOSCOPY Left 2022    CYSTOSCOPY URETERAL STENT INSERTION performed by Selwyn Farrar MD at [facility] OR    HYSTERECTOMY, TOTAL ABDOMINAL (CERVIX REMOVED)      ROTATOR CUFF REPAIR Left 2017    URETER SURGERY Left 2022    URETEROSCOPY performed by Selwyn Farrar MD at [facility] OR

## 2025-07-10 NOTE — PATIENT INSTRUCTIONS
SURVEY:    You may be receiving a survey from [employer] regarding your visit today.    Please complete the survey to enable us to provide the highest quality of care to you and your family.      Thank you.    Clinical Care Team: MD Tyrel Bhagat LPN              Triage: Yolanda Gregorio CMA              Clerical Team: Yolanda Avila

## 2025-07-10 NOTE — PATIENT INSTRUCTIONS
Will ADD Zetia 10 mg one nightly     Will follow up in 3 months with lipids prior   (12 hr fasting lab)

## 2025-07-13 LAB
EKG ATRIAL RATE: 61 BPM
EKG P AXIS: 62 DEGREES
EKG P-R INTERVAL: 158 MS
EKG Q-T INTERVAL: 418 MS
EKG QRS DURATION: 78 MS
EKG QTC CALCULATION (BAZETT): 420 MS
EKG R AXIS: 47 DEGREES
EKG T AXIS: 45 DEGREES
EKG VENTRICULAR RATE: 61 BPM

## 2025-07-15 NOTE — PROGRESS NOTES
Phong Aguayo MD  [facility]  [facility]  [address] [phone]        July 10, 2025        Rabia De La Rosa MD  [address] [place] [address]     RE:  ERNIE GARCIA  :  1965    Dear Dr. De La Rosa:    CHIEF COMPLAINT:    Myalgias and arthralgias, on Crestor, Lipitor, and lovastatin.  Coronary artery disease, status post last catheterization 2024, that showed 80% disease in the ostium of a diagonal that arose from within a stent with angioplasty alone of the ostium of the diagonal through the stent struts.    HISTORY OF PRESENT ILLNESS:  I had the pleasure of seeing Ernie Garcia in our office on July 10, 2025.  She is a pleasant 60-year-old female who has a long history of chest pain and chest discomfort.  She did have a history of SVT at 290 beats per minute.  We placed her on Cardizem.  However, she developed marked myalgias, which improved, and we stopped diltiazem, and worsened when we restarted diltiazem.  Therefore, I placed her on flecainide 50 mg b.i.d., which has been increased to 150 mg b.i.d.  She has had no known episodes of SVT.  We stopped the flecainide after her last heart catheterization, have not restarted.    She has done well.  She has had no episodes of SVT.  She denies any chest pain or chest discomfort.  No unusual shortness of breath.    She was on Mevacor 10 mg daily, but developed myalgias on her legs and stopped the Mevacor and her myalgias subsided.    She has also developed some back pain with sharp pain that starts in the lower end of her spine and travels down the back of her right leg.    Again, she denies any chest pain or chest discomfort.  Energy level has been good.  She is planning a bus trip through [facility] starting in [place] going through [facility] into [facility].  This is a 10-day trip on the bus she is taking with her sister.    She does have a history of coronary

## 2025-07-17 RX ORDER — PANTOPRAZOLE SODIUM 40 MG/1
40 TABLET, DELAYED RELEASE ORAL DAILY
Qty: 90 TABLET | Refills: 3 | Status: SHIPPED | OUTPATIENT
Start: 2025-07-17

## 2025-07-17 RX ORDER — FAMOTIDINE 20 MG/1
TABLET, FILM COATED ORAL
Qty: 180 TABLET | Refills: 3 | Status: SHIPPED | OUTPATIENT
Start: 2025-07-17

## 2025-08-08 ENCOUNTER — OFFICE VISIT (OUTPATIENT)
Dept: FAMILY MEDICINE CLINIC | Age: 60
End: 2025-08-08
Payer: COMMERCIAL

## 2025-08-08 VITALS
SYSTOLIC BLOOD PRESSURE: 116 MMHG | HEIGHT: 70 IN | BODY MASS INDEX: 41.95 KG/M2 | HEART RATE: 65 BPM | OXYGEN SATURATION: 96 % | DIASTOLIC BLOOD PRESSURE: 78 MMHG | WEIGHT: 293 LBS

## 2025-08-08 DIAGNOSIS — H66.002 NON-RECURRENT ACUTE SUPPURATIVE OTITIS MEDIA OF LEFT EAR WITHOUT SPONTANEOUS RUPTURE OF TYMPANIC MEMBRANE: Primary | ICD-10-CM

## 2025-08-08 PROCEDURE — 99213 OFFICE O/P EST LOW 20 MIN: CPT | Performed by: NURSE PRACTITIONER

## 2025-08-08 RX ORDER — CIPROFLOXACIN AND DEXAMETHASONE 3; 1 MG/ML; MG/ML
4 SUSPENSION/ DROPS AURICULAR (OTIC) 2 TIMES DAILY
COMMUNITY
Start: 2025-08-06

## 2025-08-08 RX ORDER — LOVASTATIN 10 MG/1
10 TABLET ORAL NIGHTLY
COMMUNITY
Start: 2025-07-10

## 2025-08-08 ASSESSMENT — ENCOUNTER SYMPTOMS
SHORTNESS OF BREATH: 0
VOMITING: 0
DIARRHEA: 0
COUGH: 0
NAUSEA: 0
RHINORRHEA: 1
SINUS PAIN: 1

## 2025-08-08 ASSESSMENT — PATIENT HEALTH QUESTIONNAIRE - PHQ9
SUM OF ALL RESPONSES TO PHQ QUESTIONS 1-9: 0
2. FEELING DOWN, DEPRESSED OR HOPELESS: NOT AT ALL
1. LITTLE INTEREST OR PLEASURE IN DOING THINGS: NOT AT ALL
SUM OF ALL RESPONSES TO PHQ QUESTIONS 1-9: 0

## 2025-08-27 ENCOUNTER — HOSPITAL ENCOUNTER (OUTPATIENT)
Dept: MAMMOGRAPHY | Age: 60
Discharge: HOME | End: 2025-08-29
Attending: FAMILY MEDICINE
Payer: COMMERCIAL

## 2025-08-27 DIAGNOSIS — Z12.31 BREAST CANCER SCREENING BY MAMMOGRAM: ICD-10-CM

## 2025-08-27 PROCEDURE — 77067 SCR MAMMO BI INCL CAD: CPT

## 2025-09-02 RX ORDER — METOPROLOL TARTRATE 25 MG/1
25 TABLET, FILM COATED ORAL 2 TIMES DAILY
Qty: 180 TABLET | Refills: 3 | Status: SHIPPED | OUTPATIENT
Start: 2025-09-02

## 2025-09-02 RX ORDER — LISINOPRIL 5 MG/1
5 TABLET ORAL DAILY
Qty: 90 TABLET | Refills: 1 | Status: SHIPPED | OUTPATIENT
Start: 2025-09-02

## 2025-09-02 RX ORDER — FUROSEMIDE 20 MG/1
20 TABLET ORAL DAILY
Qty: 90 TABLET | Refills: 3 | Status: SHIPPED | OUTPATIENT
Start: 2025-09-02

## (undated) DEVICE — SINGLE ACTION PUMPING SYSTEM

## (undated) DEVICE — JELLY LUBRICATING 4OZ FLIP TOP TB E Z

## (undated) DEVICE — GOWN,AURORA,NONRNF,XL,30/CS: Brand: MEDLINE

## (undated) DEVICE — ADAPTER URO SCP UROLOK LL

## (undated) DEVICE — MEDI-VAC NON-CONDUCTIVE SUCTION TUBING 6MM X 6.1M (20 FT.) L: Brand: CARDINAL HEALTH

## (undated) DEVICE — Device

## (undated) DEVICE — STRIP,CLOSURE,WOUND,MEDI-STRIP,1/2X4: Brand: MEDLINE

## (undated) DEVICE — MERCY HEALTH ST CHARLES: Brand: MEDLINE INDUSTRIES, INC.

## (undated) DEVICE — GUIDEWIRE VASC STR 3 CM 0.035 INX150 CM STD NIT ZIPWIRE

## (undated) DEVICE — SOLUTION IV IRRIG WATER 1000ML POUR BRL 2F7114

## (undated) DEVICE — FORCEPS BX L240CM JAW DIA2.2MM RAD JAW 4 HOT DISP

## (undated) DEVICE — SOLUTION IRRIG 3000ML 0.9% SOD CHL USP UROMATIC PLAS CONT